# Patient Record
Sex: FEMALE | Race: WHITE | NOT HISPANIC OR LATINO | Employment: UNEMPLOYED | URBAN - METROPOLITAN AREA
[De-identification: names, ages, dates, MRNs, and addresses within clinical notes are randomized per-mention and may not be internally consistent; named-entity substitution may affect disease eponyms.]

---

## 2017-05-12 ENCOUNTER — TELEPHONE (OUTPATIENT)
Dept: FAMILY MEDICINE | Facility: HOSPITAL | Age: 62
End: 2017-05-12

## 2017-05-12 ENCOUNTER — OFFICE VISIT (OUTPATIENT)
Dept: FAMILY MEDICINE | Facility: HOSPITAL | Age: 62
End: 2017-05-12
Payer: MEDICARE

## 2017-05-12 VITALS
DIASTOLIC BLOOD PRESSURE: 86 MMHG | SYSTOLIC BLOOD PRESSURE: 140 MMHG | HEART RATE: 60 BPM | WEIGHT: 245.81 LBS | HEIGHT: 65 IN | BODY MASS INDEX: 40.96 KG/M2

## 2017-05-12 DIAGNOSIS — G89.29 CHRONIC MIDLINE LOW BACK PAIN WITHOUT SCIATICA: ICD-10-CM

## 2017-05-12 DIAGNOSIS — M54.50 CHRONIC MIDLINE LOW BACK PAIN WITHOUT SCIATICA: ICD-10-CM

## 2017-05-12 DIAGNOSIS — E66.01 MORBID OBESITY, UNSPECIFIED OBESITY TYPE: Primary | ICD-10-CM

## 2017-05-12 DIAGNOSIS — E78.5 HYPERLIPIDEMIA, UNSPECIFIED HYPERLIPIDEMIA TYPE: ICD-10-CM

## 2017-05-12 DIAGNOSIS — I10 ESSENTIAL HYPERTENSION: ICD-10-CM

## 2017-05-12 DIAGNOSIS — E11.9 TYPE 2 DIABETES MELLITUS WITHOUT COMPLICATION, WITHOUT LONG-TERM CURRENT USE OF INSULIN: ICD-10-CM

## 2017-05-12 PROCEDURE — 99213 OFFICE O/P EST LOW 20 MIN: CPT | Performed by: FAMILY MEDICINE

## 2017-05-12 RX ORDER — METFORMIN HYDROCHLORIDE 1000 MG/1
1000 TABLET ORAL
Qty: 90 TABLET | Refills: 3 | Status: SHIPPED | OUTPATIENT
Start: 2017-05-12 | End: 2017-05-12 | Stop reason: SDUPTHER

## 2017-05-12 RX ORDER — LISINOPRIL 5 MG/1
5 TABLET ORAL DAILY
Qty: 90 TABLET | Refills: 3 | Status: SHIPPED | OUTPATIENT
Start: 2017-05-12 | End: 2018-05-17 | Stop reason: SDUPTHER

## 2017-05-12 RX ORDER — HYDROCHLOROTHIAZIDE 25 MG/1
25 TABLET ORAL DAILY
Qty: 90 TABLET | Refills: 3 | Status: SHIPPED | OUTPATIENT
Start: 2017-05-12 | End: 2018-05-17 | Stop reason: SDUPTHER

## 2017-05-12 RX ORDER — IBUPROFEN 800 MG/1
800 TABLET ORAL DAILY PRN
Qty: 45 TABLET | Refills: 0 | Status: SHIPPED | OUTPATIENT
Start: 2017-05-12 | End: 2018-05-17 | Stop reason: SDUPTHER

## 2017-05-12 RX ORDER — HYDROCHLOROTHIAZIDE 25 MG/1
25 TABLET ORAL DAILY
Qty: 90 TABLET | Refills: 3 | Status: SHIPPED | OUTPATIENT
Start: 2017-05-12 | End: 2017-05-12 | Stop reason: SDUPTHER

## 2017-05-12 RX ORDER — ATORVASTATIN CALCIUM 40 MG/1
40 TABLET, FILM COATED ORAL DAILY
Qty: 90 TABLET | Refills: 3 | Status: SHIPPED | OUTPATIENT
Start: 2017-05-12 | End: 2018-05-17 | Stop reason: SDUPTHER

## 2017-05-12 RX ORDER — ATORVASTATIN CALCIUM 40 MG/1
40 TABLET, FILM COATED ORAL DAILY
Qty: 90 TABLET | Refills: 3 | Status: SHIPPED | OUTPATIENT
Start: 2017-05-12 | End: 2017-05-12 | Stop reason: SDUPTHER

## 2017-05-12 RX ORDER — METOPROLOL TARTRATE 50 MG/1
50 TABLET ORAL 2 TIMES DAILY
Qty: 90 TABLET | Refills: 3 | Status: SHIPPED | OUTPATIENT
Start: 2017-05-12 | End: 2017-05-12 | Stop reason: SDUPTHER

## 2017-05-12 RX ORDER — IBUPROFEN 800 MG/1
800 TABLET ORAL DAILY PRN
Qty: 45 TABLET | Refills: 0 | Status: SHIPPED | OUTPATIENT
Start: 2017-05-12 | End: 2017-05-12 | Stop reason: SDUPTHER

## 2017-05-12 RX ORDER — LISINOPRIL 5 MG/1
5 TABLET ORAL DAILY
Qty: 90 TABLET | Refills: 3 | Status: SHIPPED | OUTPATIENT
Start: 2017-05-12 | End: 2017-05-12 | Stop reason: SDUPTHER

## 2017-05-12 RX ORDER — METFORMIN HYDROCHLORIDE 1000 MG/1
1000 TABLET ORAL
Qty: 90 TABLET | Refills: 3 | Status: SHIPPED | OUTPATIENT
Start: 2017-05-12 | End: 2017-06-06 | Stop reason: SDUPTHER

## 2017-05-12 RX ORDER — METOPROLOL TARTRATE 50 MG/1
50 TABLET ORAL 2 TIMES DAILY
Qty: 90 TABLET | Refills: 3 | Status: SHIPPED | OUTPATIENT
Start: 2017-05-12 | End: 2017-06-06 | Stop reason: SDUPTHER

## 2017-05-12 NOTE — PROGRESS NOTES
Subjective:       Patient ID: Nam William is a 61 y.o. female.    Chief Complaint: Follow-up    HPI   62 y/o female with PMH of HTN, DM, and HLD here for check up. Pt states she is feeling well and has no complaints today. She denies recent illness, fever, chills, HA, SOB, CP, n/v/d. She has been trying to stay active and exercise more. She reports that she uses a push mower to cut her grass and also enjoys gardening to stay active.     Review of Systems   Constitutional: Negative for chills and fever.   HENT: Negative for congestion, rhinorrhea and sore throat.    Eyes: Negative for discharge and redness.   Respiratory: Negative for cough, shortness of breath, wheezing and stridor.    Cardiovascular: Negative for chest pain, palpitations and leg swelling.   Gastrointestinal: Negative for abdominal distention, abdominal pain, blood in stool, constipation, diarrhea, nausea and vomiting.   Genitourinary: Negative for difficulty urinating, dysuria and hematuria.   Musculoskeletal: Negative for back pain and neck pain.   Skin: Negative for rash.   Neurological: Negative for dizziness, light-headedness and headaches.   Psychiatric/Behavioral: Negative for agitation, behavioral problems and confusion.   All other systems reviewed and are negative.      Objective:      Vitals:    05/12/17 0912   BP: (!) 140/86   Pulse: 60     Physical Exam   Constitutional: She is oriented to person, place, and time. She appears well-developed and well-nourished. No distress.   HENT:   Head: Normocephalic and atraumatic.   Mouth/Throat: No oropharyngeal exudate.   Eyes: Conjunctivae and EOM are normal. Pupils are equal, round, and reactive to light. Right eye exhibits no discharge. Left eye exhibits no discharge. No scleral icterus.   Neck: Normal range of motion. Neck supple. No tracheal deviation present. No thyromegaly present.   Cardiovascular: Normal rate, regular rhythm, normal heart sounds and intact distal pulses.  Exam reveals  no gallop and no friction rub.    No murmur heard.  Pulmonary/Chest: Effort normal and breath sounds normal. No respiratory distress. She has no wheezes. She has no rales. She exhibits no tenderness.   Abdominal: Soft. Bowel sounds are normal. She exhibits no distension and no mass. There is no tenderness.   Musculoskeletal: Normal range of motion. She exhibits no edema or tenderness.   Lymphadenopathy:     She has no cervical adenopathy.   Neurological: She is alert and oriented to person, place, and time.   Skin: Skin is warm. No rash noted. She is not diaphoretic. No erythema.   Psychiatric: She has a normal mood and affect. Her behavior is normal. Judgment and thought content normal.   Nursing note and vitals reviewed.      Assessment:       1. Morbid obesity, unspecified obesity type    2. Type 2 diabetes mellitus without complication, without long-term current use of insulin    3. Hyperlipidemia, unspecified hyperlipidemia type    4. Essential hypertension    5. Chronic midline low back pain without sciatica        Plan:       Type 2 diabetes mellitus without complication, without long-term current use of insulin   Stable  -     Comprehensive metabolic panel; Future; Expected date: 5/12/17  -     Hemoglobin A1c; Future; Expected date: 5/12/17  -     Lipid panel; Future; Expected date: 5/12/17  -     atorvastatin (LIPITOR) 40 MG tablet; Take 1 tablet (40 mg total) by mouth once daily.  Dispense: 90 tablet; Refill: 3  -     metformin (GLUCOPHAGE) 1000 MG tablet; Take 1 tablet (1,000 mg total) by mouth daily with breakfast.  Dispense: 90 tablet; Refill: 3   A1c worsened since June 2016. Pt needs closer follow up and to take her Metformin as prescribed, BID and not qd as she is now.   Hyperlipidemia, unspecified hyperlipidemia type  -     Lipid panel; Future; Expected date: 5/12/17  -     atorvastatin (LIPITOR) 40 MG tablet; Take 1 tablet (40 mg total) by mouth once daily.  Dispense: 90 tablet; Refill:  3    Essential hypertension   Stable  -     Comprehensive metabolic panel; Future; Expected date: 5/12/17  -     hydrochlorothiazide (HYDRODIURIL) 25 MG tablet; Take 1 tablet (25 mg total) by mouth once daily.  Dispense: 90 tablet; Refill: 3  -     lisinopril (PRINIVIL,ZESTRIL) 5 MG tablet; Take 1 tablet (5 mg total) by mouth once daily.  Dispense: 90 tablet; Refill: 3  -     metoprolol tartrate (LOPRESSOR) 50 MG tablet; Take 1 tablet (50 mg total) by mouth 2 (two) times daily.  Dispense: 90 tablet; Refill: 3    Chronic midline low back pain without sciatica   Stable  -     ibuprofen (ADVIL,MOTRIN) 800 MG tablet; Take 1 tablet (800 mg total) by mouth daily as needed.  Dispense: 45 tablet; Refill: 0      Return in about 6 months (around 11/12/2017).   5/12/2017 David Montague M.D.  [facility] Resident PGY-1

## 2017-05-30 ENCOUNTER — TELEPHONE (OUTPATIENT)
Dept: FAMILY MEDICINE | Facility: HOSPITAL | Age: 62
End: 2017-05-30

## 2017-06-06 DIAGNOSIS — E11.9 TYPE 2 DIABETES MELLITUS WITHOUT COMPLICATION, WITHOUT LONG-TERM CURRENT USE OF INSULIN: ICD-10-CM

## 2017-06-06 DIAGNOSIS — I10 ESSENTIAL HYPERTENSION: ICD-10-CM

## 2017-06-06 RX ORDER — METFORMIN HYDROCHLORIDE 1000 MG/1
1000 TABLET ORAL 2 TIMES DAILY WITH MEALS
Qty: 180 TABLET | Refills: 3 | Status: SHIPPED | OUTPATIENT
Start: 2017-06-06 | End: 2018-05-17 | Stop reason: SDUPTHER

## 2017-06-06 RX ORDER — METOPROLOL TARTRATE 50 MG/1
50 TABLET ORAL 2 TIMES DAILY
Qty: 180 TABLET | Refills: 3 | Status: SHIPPED | OUTPATIENT
Start: 2017-06-06 | End: 2018-05-17 | Stop reason: SDUPTHER

## 2018-05-17 ENCOUNTER — OFFICE VISIT (OUTPATIENT)
Dept: FAMILY MEDICINE | Facility: HOSPITAL | Age: 63
End: 2018-05-17
Attending: FAMILY MEDICINE
Payer: MEDICARE

## 2018-05-17 VITALS
DIASTOLIC BLOOD PRESSURE: 81 MMHG | WEIGHT: 237.88 LBS | SYSTOLIC BLOOD PRESSURE: 136 MMHG | BODY MASS INDEX: 39.63 KG/M2 | HEIGHT: 65 IN | HEART RATE: 71 BPM

## 2018-05-17 DIAGNOSIS — I10 ESSENTIAL HYPERTENSION: ICD-10-CM

## 2018-05-17 DIAGNOSIS — G89.29 CHRONIC MIDLINE LOW BACK PAIN WITHOUT SCIATICA: ICD-10-CM

## 2018-05-17 DIAGNOSIS — E78.5 HYPERLIPIDEMIA, UNSPECIFIED HYPERLIPIDEMIA TYPE: ICD-10-CM

## 2018-05-17 DIAGNOSIS — E11.9 TYPE 2 DIABETES MELLITUS WITHOUT COMPLICATION, WITHOUT LONG-TERM CURRENT USE OF INSULIN: ICD-10-CM

## 2018-05-17 DIAGNOSIS — M54.50 CHRONIC MIDLINE LOW BACK PAIN WITHOUT SCIATICA: ICD-10-CM

## 2018-05-17 PROCEDURE — 99213 OFFICE O/P EST LOW 20 MIN: CPT | Performed by: FAMILY MEDICINE

## 2018-05-17 RX ORDER — METOPROLOL TARTRATE 50 MG/1
50 TABLET ORAL 2 TIMES DAILY
Qty: 180 TABLET | Refills: 3 | Status: SHIPPED | OUTPATIENT
Start: 2018-05-17 | End: 2018-05-17 | Stop reason: SDUPTHER

## 2018-05-17 RX ORDER — HYDROCHLOROTHIAZIDE 25 MG/1
25 TABLET ORAL DAILY
Qty: 90 TABLET | Refills: 3 | Status: SHIPPED | OUTPATIENT
Start: 2018-05-17 | End: 2018-05-17 | Stop reason: SDUPTHER

## 2018-05-17 RX ORDER — METFORMIN HYDROCHLORIDE 1000 MG/1
1000 TABLET ORAL 2 TIMES DAILY WITH MEALS
Qty: 180 TABLET | Refills: 3 | Status: SHIPPED | OUTPATIENT
Start: 2018-05-17 | End: 2018-05-17 | Stop reason: SDUPTHER

## 2018-05-17 RX ORDER — LISINOPRIL 5 MG/1
5 TABLET ORAL DAILY
Qty: 90 TABLET | Refills: 3 | Status: SHIPPED | OUTPATIENT
Start: 2018-05-17 | End: 2018-05-17 | Stop reason: SDUPTHER

## 2018-05-17 RX ORDER — HYDROCHLOROTHIAZIDE 25 MG/1
25 TABLET ORAL DAILY
Qty: 90 TABLET | Refills: 3 | Status: SHIPPED | OUTPATIENT
Start: 2018-05-17 | End: 2019-05-14 | Stop reason: SDUPTHER

## 2018-05-17 RX ORDER — ATORVASTATIN CALCIUM 40 MG/1
40 TABLET, FILM COATED ORAL DAILY
Qty: 90 TABLET | Refills: 3 | Status: SHIPPED | OUTPATIENT
Start: 2018-05-17 | End: 2018-05-17 | Stop reason: SDUPTHER

## 2018-05-17 RX ORDER — LISINOPRIL 5 MG/1
5 TABLET ORAL DAILY
Qty: 90 TABLET | Refills: 3 | Status: SHIPPED | OUTPATIENT
Start: 2018-05-17 | End: 2019-05-14

## 2018-05-17 RX ORDER — IBUPROFEN 800 MG/1
800 TABLET ORAL DAILY PRN
Qty: 60 TABLET | Refills: 0 | Status: SHIPPED | OUTPATIENT
Start: 2018-05-17 | End: 2018-05-17 | Stop reason: SDUPTHER

## 2018-05-17 RX ORDER — IBUPROFEN 800 MG/1
800 TABLET ORAL DAILY PRN
Qty: 60 TABLET | Refills: 0 | Status: SHIPPED | OUTPATIENT
Start: 2018-05-17 | End: 2020-05-18 | Stop reason: SDUPTHER

## 2018-05-17 RX ORDER — ATORVASTATIN CALCIUM 40 MG/1
40 TABLET, FILM COATED ORAL DAILY
Qty: 90 TABLET | Refills: 3 | Status: SHIPPED | OUTPATIENT
Start: 2018-05-17 | End: 2019-05-14 | Stop reason: SDUPTHER

## 2018-05-17 RX ORDER — METOPROLOL TARTRATE 50 MG/1
50 TABLET ORAL 2 TIMES DAILY
Qty: 180 TABLET | Refills: 3 | Status: SHIPPED | OUTPATIENT
Start: 2018-05-17 | End: 2019-05-14 | Stop reason: SDUPTHER

## 2018-05-17 RX ORDER — METFORMIN HYDROCHLORIDE 1000 MG/1
1000 TABLET ORAL 2 TIMES DAILY WITH MEALS
Qty: 180 TABLET | Refills: 3 | Status: SHIPPED | OUTPATIENT
Start: 2018-05-17 | End: 2019-05-14

## 2018-05-17 NOTE — PROGRESS NOTES
Subjective:       Patient ID: Nam William is a 62 y.o. female.    Chief Complaint: Diabetes    HPI   61 y/o F with PMH of HTN, DM2, HLD, here for check up and medication refills. States she is mostly compliant with her medications however she has been taking less metformin than prescribed. She has no complaints today and is asking that her medication be sent to mail delivery pharmacy. She denies fever, chills, HA, SOB, CP, n/v/d.   No recent travel or sick contacts.     Review of Systems   Constitutional: Negative for chills and fever.   HENT: Negative for congestion, rhinorrhea and sore throat.    Eyes: Negative for discharge and redness.   Respiratory: Negative for cough, shortness of breath, wheezing and stridor.    Cardiovascular: Negative for chest pain, palpitations and leg swelling.   Gastrointestinal: Negative for abdominal distention, abdominal pain, blood in stool, constipation, diarrhea, nausea and vomiting.   Genitourinary: Negative for difficulty urinating, dysuria and hematuria.   Musculoskeletal: Negative for back pain and neck pain.   Skin: Negative for rash.   Neurological: Negative for dizziness, light-headedness and headaches.   Psychiatric/Behavioral: Negative for agitation, behavioral problems and confusion.   All other systems reviewed and are negative.      Objective:      Vitals:    05/17/18 1612   BP: 136/81   Pulse: 71     Physical Exam   Constitutional: She is oriented to person, place, and time. She appears well-developed and well-nourished. No distress.   obese   HENT:   Head: Normocephalic and atraumatic.   Eyes: Conjunctivae and EOM are normal. Pupils are equal, round, and reactive to light. Right eye exhibits no discharge. Left eye exhibits no discharge. No scleral icterus.   Neck: Normal range of motion. Neck supple. No tracheal deviation present. No thyromegaly present.   Cardiovascular: Normal rate, regular rhythm, normal heart sounds and intact distal pulses.  Exam reveals no  gallop and no friction rub.    No murmur heard.  Pulmonary/Chest: Effort normal and breath sounds normal. No respiratory distress. She has no wheezes. She has no rales. She exhibits no tenderness.   Abdominal: Soft. Bowel sounds are normal. She exhibits no distension and no mass. There is no tenderness.   Musculoskeletal: Normal range of motion. She exhibits no edema or tenderness.   Lymphadenopathy:     She has no cervical adenopathy.   Neurological: She is alert and oriented to person, place, and time.   Skin: Skin is warm. No rash noted. She is not diaphoretic. No erythema.   Psychiatric: She has a normal mood and affect. Her behavior is normal. Judgment and thought content normal.   Nursing note and vitals reviewed.      Assessment:       1. Type 2 diabetes mellitus without complication, without long-term current use of insulin    2. Hyperlipidemia, unspecified hyperlipidemia type    3. Essential hypertension    4. Chronic midline low back pain without sciatica        Plan:       Type 2 diabetes mellitus without complication, without long-term current use of insulin  -     Hemoglobin A1c; Future; Expected date: 05/17/2018  -     Microalbumin/creatinine urine ratio  -     Lipid panel; Future; Expected date: 05/17/2018  -     CBC auto differential; Future; Expected date: 05/17/2018  -     Comprehensive metabolic panel; Future; Expected date: 05/17/2018  -     metFORMIN (GLUCOPHAGE) 1000 MG tablet; Take 1 tablet (1,000 mg total) by mouth 2 (two) times daily with meals.  Dispense: 180 tablet; Refill: 3  -     atorvastatin (LIPITOR) 40 MG tablet; Take 1 tablet (40 mg total) by mouth once daily.  Dispense: 90 tablet; Refill: 3    Hyperlipidemia, unspecified hyperlipidemia type  -     atorvastatin (LIPITOR) 40 MG tablet; Take 1 tablet (40 mg total) by mouth once daily.  Dispense: 90 tablet; Refill: 3    Essential hypertension  -     lisinopril (PRINIVIL,ZESTRIL) 5 MG tablet; Take 1 tablet (5 mg total) by mouth once  daily.  Dispense: 90 tablet; Refill: 3  -     hydroCHLOROthiazide (HYDRODIURIL) 25 MG tablet; Take 1 tablet (25 mg total) by mouth once daily.  Dispense: 90 tablet; Refill: 3  -     metoprolol tartrate (LOPRESSOR) 50 MG tablet; Take 1 tablet (50 mg total) by mouth 2 (two) times daily.  Dispense: 180 tablet; Refill: 3    Chronic midline low back pain without sciatica  -     ibuprofen (ADVIL,MOTRIN) 800 MG tablet; Take 1 tablet (800 mg total) by mouth daily as needed.  Dispense: 60 tablet; Refill: 0    Continue current medications and asked her to be more compliant with prescriptions. Also counseled on diet and lifestyle modifications. Return in 3 months for HbA1c. Needs mammogram but does not want it.     Follow-up in about 3 months (around 8/17/2018) for HbA1c check.      5/26/2018 David Montague M.D.  [facility] Resident PGY-2

## 2018-09-20 NOTE — TELEPHONE ENCOUNTER
----- Message from Cinthia Olmedo sent at 5/30/2017  9:25 AM CDT -----  Contact: SELF / [phone]  Patient states that 2 of her medications were given the wrong amount, she states they're supposed to be 180 tablets with 3 refills both medications instead of 90 day supply those medications are     1. metoprolol tartrate (LOPRESSOR) 50 MG tablet    2. metformin (GLUCOPHAGE) 1000 MG tablet      Patient would like a call back once this is done     no tingling

## 2019-05-14 ENCOUNTER — OFFICE VISIT (OUTPATIENT)
Dept: FAMILY MEDICINE | Facility: HOSPITAL | Age: 64
End: 2019-05-14
Payer: MEDICARE

## 2019-05-14 ENCOUNTER — LAB VISIT (OUTPATIENT)
Dept: LAB | Facility: HOSPITAL | Age: 64
End: 2019-05-14
Attending: FAMILY MEDICINE
Payer: MEDICARE

## 2019-05-14 VITALS
DIASTOLIC BLOOD PRESSURE: 68 MMHG | HEART RATE: 76 BPM | WEIGHT: 228.19 LBS | BODY MASS INDEX: 38.02 KG/M2 | SYSTOLIC BLOOD PRESSURE: 150 MMHG | HEIGHT: 65 IN

## 2019-05-14 DIAGNOSIS — E11.9 TYPE 2 DIABETES MELLITUS WITHOUT COMPLICATION, WITHOUT LONG-TERM CURRENT USE OF INSULIN: ICD-10-CM

## 2019-05-14 DIAGNOSIS — E78.5 HYPERLIPIDEMIA, UNSPECIFIED HYPERLIPIDEMIA TYPE: ICD-10-CM

## 2019-05-14 DIAGNOSIS — I10 ESSENTIAL HYPERTENSION: ICD-10-CM

## 2019-05-14 LAB
ALBUMIN SERPL BCP-MCNC: 4 G/DL (ref 3.5–5.2)
ALP SERPL-CCNC: 84 U/L (ref 55–135)
ALT SERPL W/O P-5'-P-CCNC: 18 U/L (ref 10–44)
ANION GAP SERPL CALC-SCNC: 10 MMOL/L (ref 8–16)
AST SERPL-CCNC: 20 U/L (ref 10–40)
BASOPHILS # BLD AUTO: 0.03 K/UL (ref 0–0.2)
BASOPHILS NFR BLD: 0.5 % (ref 0–1.9)
BILIRUB SERPL-MCNC: 0.6 MG/DL (ref 0.1–1)
BUN SERPL-MCNC: 14 MG/DL (ref 8–23)
CALCIUM SERPL-MCNC: 10.3 MG/DL (ref 8.7–10.5)
CHLORIDE SERPL-SCNC: 100 MMOL/L (ref 95–110)
CHOLEST SERPL-MCNC: 209 MG/DL (ref 120–199)
CHOLEST/HDLC SERPL: 4.3 {RATIO} (ref 2–5)
CO2 SERPL-SCNC: 31 MMOL/L (ref 23–29)
CREAT SERPL-MCNC: 1 MG/DL (ref 0.5–1.4)
DIFFERENTIAL METHOD: NORMAL
EOSINOPHIL # BLD AUTO: 0.1 K/UL (ref 0–0.5)
EOSINOPHIL NFR BLD: 1.7 % (ref 0–8)
ERYTHROCYTE [DISTWIDTH] IN BLOOD BY AUTOMATED COUNT: 12.7 % (ref 11.5–14.5)
EST. GFR  (AFRICAN AMERICAN): >60 ML/MIN/1.73 M^2
EST. GFR  (NON AFRICAN AMERICAN): >60 ML/MIN/1.73 M^2
ESTIMATED AVG GLUCOSE: 229 MG/DL (ref 68–131)
GLUCOSE SERPL-MCNC: 277 MG/DL (ref 70–110)
HBA1C MFR BLD HPLC: 9.6 % (ref 4–5.6)
HCT VFR BLD AUTO: 44.7 % (ref 37–48.5)
HDLC SERPL-MCNC: 49 MG/DL (ref 40–75)
HDLC SERPL: 23.4 % (ref 20–50)
HGB BLD-MCNC: 14.5 G/DL (ref 12–16)
LDLC SERPL CALC-MCNC: 98.4 MG/DL (ref 63–159)
LYMPHOCYTES # BLD AUTO: 1.7 K/UL (ref 1–4.8)
LYMPHOCYTES NFR BLD: 26.7 % (ref 18–48)
MCH RBC QN AUTO: 27.9 PG (ref 27–31)
MCHC RBC AUTO-ENTMCNC: 32.4 G/DL (ref 32–36)
MCV RBC AUTO: 86 FL (ref 82–98)
MONOCYTES # BLD AUTO: 0.4 K/UL (ref 0.3–1)
MONOCYTES NFR BLD: 6.3 % (ref 4–15)
NEUTROPHILS # BLD AUTO: 4.1 K/UL (ref 1.8–7.7)
NEUTROPHILS NFR BLD: 64.6 % (ref 38–73)
NONHDLC SERPL-MCNC: 160 MG/DL
PLATELET # BLD AUTO: 168 K/UL (ref 150–350)
PMV BLD AUTO: 11.7 FL (ref 9.2–12.9)
POTASSIUM SERPL-SCNC: 3.9 MMOL/L (ref 3.5–5.1)
PROT SERPL-MCNC: 7.8 G/DL (ref 6–8.4)
RBC # BLD AUTO: 5.19 M/UL (ref 4–5.4)
SODIUM SERPL-SCNC: 141 MMOL/L (ref 136–145)
TRIGL SERPL-MCNC: 308 MG/DL (ref 30–150)
WBC # BLD AUTO: 6.36 K/UL (ref 3.9–12.7)

## 2019-05-14 PROCEDURE — 36415 COLL VENOUS BLD VENIPUNCTURE: CPT

## 2019-05-14 PROCEDURE — 83036 HEMOGLOBIN GLYCOSYLATED A1C: CPT

## 2019-05-14 PROCEDURE — 80053 COMPREHEN METABOLIC PANEL: CPT

## 2019-05-14 PROCEDURE — 85025 COMPLETE CBC W/AUTO DIFF WBC: CPT

## 2019-05-14 PROCEDURE — 99213 OFFICE O/P EST LOW 20 MIN: CPT | Performed by: FAMILY MEDICINE

## 2019-05-14 PROCEDURE — 80061 LIPID PANEL: CPT

## 2019-05-14 RX ORDER — ATORVASTATIN CALCIUM 40 MG/1
40 TABLET, FILM COATED ORAL DAILY
Qty: 90 TABLET | Refills: 3 | Status: SHIPPED | OUTPATIENT
Start: 2019-05-14 | End: 2020-05-18 | Stop reason: SDUPTHER

## 2019-05-14 RX ORDER — METFORMIN HYDROCHLORIDE 1000 MG/1
1000 TABLET ORAL
Qty: 90 TABLET | Refills: 3 | Status: SHIPPED | OUTPATIENT
Start: 2019-05-14 | End: 2020-05-18 | Stop reason: SDUPTHER

## 2019-05-14 RX ORDER — METOPROLOL TARTRATE 50 MG/1
50 TABLET ORAL 2 TIMES DAILY
Qty: 180 TABLET | Refills: 3 | Status: SHIPPED | OUTPATIENT
Start: 2019-05-14 | End: 2020-05-18 | Stop reason: SDUPTHER

## 2019-05-14 RX ORDER — HYDROCHLOROTHIAZIDE 25 MG/1
25 TABLET ORAL DAILY
Qty: 90 TABLET | Refills: 3 | Status: SHIPPED | OUTPATIENT
Start: 2019-05-14 | End: 2020-05-18 | Stop reason: ALTCHOICE

## 2019-05-14 NOTE — PROGRESS NOTES
Subjective:       Patient ID: Nam William is a 63 y.o. female.    Chief Complaint: diabetes follow up     HPI   Doing well, no complaints.  Taking metformin not as prescribed, only taking 1000 mg daily due to GI upset.  Claims compliance with all meds, including BP meds.  Does not want any cancer screening (paps, mammo, scopes, or stool testing).    Review of Systems   Constitutional: Negative for chills and fever.   HENT: Negative for congestion, rhinorrhea and sore throat.    Eyes: Negative for discharge and redness.   Respiratory: Negative for cough, shortness of breath, wheezing and stridor.    Cardiovascular: Negative for chest pain, palpitations and leg swelling.   Gastrointestinal: Negative for abdominal distention, abdominal pain, blood in stool, constipation, diarrhea, nausea and vomiting.   Genitourinary: Negative for difficulty urinating, dysuria and hematuria.   Musculoskeletal: Negative for back pain and neck pain.   Skin: Negative for rash.   Neurological: Negative for dizziness, light-headedness and headaches.   Psychiatric/Behavioral: Negative for agitation, behavioral problems and confusion.   All other systems reviewed and are negative.      Objective:      Vitals:    05/14/19 1330   BP: (!) 150/68   Pulse: 76     Physical Exam   Constitutional: She is oriented to person, place, and time. She appears well-developed and well-nourished. No distress.   HENT:   Head: Normocephalic and atraumatic.   Eyes: Pupils are equal, round, and reactive to light. Conjunctivae and EOM are normal. Right eye exhibits no discharge. Left eye exhibits no discharge. No scleral icterus.   Neck: Normal range of motion. Neck supple. No tracheal deviation present. No thyromegaly present.   Cardiovascular: Normal rate, regular rhythm, normal heart sounds and intact distal pulses. Exam reveals no gallop and no friction rub.   No murmur heard.  Pulmonary/Chest: Effort normal and breath sounds normal. No respiratory distress.  She has no wheezes. She has no rales. She exhibits no tenderness.   Abdominal: Soft. Bowel sounds are normal. She exhibits no distension and no mass. There is no tenderness.   Musculoskeletal: Normal range of motion. She exhibits no edema or tenderness.   Lymphadenopathy:     She has no cervical adenopathy.   Neurological: She is alert and oriented to person, place, and time.   Skin: Skin is warm. No rash noted. She is not diaphoretic. No erythema.   Psychiatric: She has a normal mood and affect. Her behavior is normal. Judgment and thought content normal.   Nursing note and vitals reviewed.      Assessment:       1. Type 2 diabetes mellitus without complication, without long-term current use of insulin    2. Hyperlipidemia, unspecified hyperlipidemia type    3. Essential hypertension        Plan:       Type 2 diabetes mellitus without complication, without long-term current use of insulin  -     atorvastatin (LIPITOR) 40 MG tablet; Take 1 tablet (40 mg total) by mouth once daily.  Dispense: 90 tablet; Refill: 3  -     metFORMIN (GLUCOPHAGE) 1000 MG tablet; Take 1 tablet (1,000 mg total) by mouth daily with breakfast.  Dispense: 90 tablet; Refill: 3    Hyperlipidemia, unspecified hyperlipidemia type  -     atorvastatin (LIPITOR) 40 MG tablet; Take 1 tablet (40 mg total) by mouth once daily.  Dispense: 90 tablet; Refill: 3    Essential hypertension  -     hydroCHLOROthiazide (HYDRODIURIL) 25 MG tablet; Take 1 tablet (25 mg total) by mouth once daily.  Dispense: 90 tablet; Refill: 3  -     lisinopril 10 MG Tab; Take 1 tablet (10 mg total) by mouth once daily.  Dispense: 90 tablet; Refill: 3  -     metoprolol tartrate (LOPRESSOR) 50 MG tablet; Take 1 tablet (50 mg total) by mouth 2 (two) times daily.  Dispense: 180 tablet; Refill: 3    Increased ACE-I to 10 mg daily. Will call patient with new lab results and see if her BP has improved on new Lisinopril dosage.   Diabetes is not well controlled as of 2 years ago. Will  call with new A1c value once she gets labs.     Follow up in about 6 months (around 11/14/2019) for diabetes follow up.      5/14/2019 David Montague M.D.  [facility] Resident PGY3

## 2019-05-14 NOTE — PROGRESS NOTES
I reviewed the note and discussed with Dr. Montague. This lady does not come very often and is not interested in Ca screening. I suggest trying Metformin ER which may be less likely to cause diarrhea. May be able to titrate BP meds by phone and give her encouragement for followup visits.

## 2019-05-16 ENCOUNTER — TELEPHONE (OUTPATIENT)
Dept: FAMILY MEDICINE | Facility: HOSPITAL | Age: 64
End: 2019-05-16

## 2019-05-16 NOTE — TELEPHONE ENCOUNTER
----- Message from Shantell Mera MA sent at 5/16/2019  8:59 AM CDT -----  Patient returning your call.  Please call back to number above.  Thanks.

## 2020-05-18 ENCOUNTER — OFFICE VISIT (OUTPATIENT)
Dept: FAMILY MEDICINE | Facility: HOSPITAL | Age: 65
End: 2020-05-18
Payer: MEDICARE

## 2020-05-18 VITALS
HEIGHT: 65 IN | SYSTOLIC BLOOD PRESSURE: 176 MMHG | BODY MASS INDEX: 37.18 KG/M2 | DIASTOLIC BLOOD PRESSURE: 95 MMHG | HEART RATE: 68 BPM | WEIGHT: 223.13 LBS

## 2020-05-18 DIAGNOSIS — E11.9 TYPE 2 DIABETES MELLITUS WITHOUT COMPLICATION, WITHOUT LONG-TERM CURRENT USE OF INSULIN: Primary | ICD-10-CM

## 2020-05-18 DIAGNOSIS — I10 ESSENTIAL HYPERTENSION: ICD-10-CM

## 2020-05-18 DIAGNOSIS — E78.5 HYPERLIPIDEMIA, UNSPECIFIED HYPERLIPIDEMIA TYPE: ICD-10-CM

## 2020-05-18 DIAGNOSIS — G89.29 CHRONIC MIDLINE LOW BACK PAIN WITHOUT SCIATICA: ICD-10-CM

## 2020-05-18 DIAGNOSIS — Z53.20 SCREENING DECLINED BY PATIENT: ICD-10-CM

## 2020-05-18 DIAGNOSIS — M54.50 CHRONIC MIDLINE LOW BACK PAIN WITHOUT SCIATICA: ICD-10-CM

## 2020-05-18 LAB — GLUCOSE SERPL-MCNC: 285 MG/DL (ref 70–110)

## 2020-05-18 PROCEDURE — 99214 OFFICE O/P EST MOD 30 MIN: CPT | Performed by: STUDENT IN AN ORGANIZED HEALTH CARE EDUCATION/TRAINING PROGRAM

## 2020-05-18 PROCEDURE — 82962 GLUCOSE BLOOD TEST: CPT | Performed by: STUDENT IN AN ORGANIZED HEALTH CARE EDUCATION/TRAINING PROGRAM

## 2020-05-18 RX ORDER — LISINOPRIL AND HYDROCHLOROTHIAZIDE 20; 25 MG/1; MG/1
1 TABLET ORAL DAILY
Qty: 90 TABLET | Refills: 3 | Status: SHIPPED | OUTPATIENT
Start: 2020-05-18 | End: 2021-03-22 | Stop reason: SDUPTHER

## 2020-05-18 RX ORDER — METFORMIN HYDROCHLORIDE 1000 MG/1
1000 TABLET ORAL 2 TIMES DAILY
Qty: 180 TABLET | Refills: 3 | Status: SHIPPED | OUTPATIENT
Start: 2020-05-18 | End: 2021-03-22 | Stop reason: SDUPTHER

## 2020-05-18 RX ORDER — METOPROLOL TARTRATE 50 MG/1
50 TABLET ORAL 2 TIMES DAILY
Qty: 180 TABLET | Refills: 3 | Status: SHIPPED | OUTPATIENT
Start: 2020-05-18 | End: 2021-03-22 | Stop reason: SDUPTHER

## 2020-05-18 RX ORDER — IBUPROFEN 800 MG/1
800 TABLET ORAL DAILY PRN
Qty: 60 TABLET | Refills: 0 | Status: SHIPPED | OUTPATIENT
Start: 2020-05-18 | End: 2022-09-02 | Stop reason: SDUPTHER

## 2020-05-18 RX ORDER — INSULIN PUMP SYRINGE, 3 ML
EACH MISCELLANEOUS
Qty: 1 EACH | Refills: 0 | Status: SHIPPED | OUTPATIENT
Start: 2020-05-18 | End: 2023-08-10 | Stop reason: SDUPTHER

## 2020-05-18 RX ORDER — ATORVASTATIN CALCIUM 40 MG/1
40 TABLET, FILM COATED ORAL DAILY
Qty: 90 TABLET | Refills: 3 | Status: SHIPPED | OUTPATIENT
Start: 2020-05-18 | End: 2021-03-22 | Stop reason: SDUPTHER

## 2020-05-18 RX ORDER — LANCETS
EACH MISCELLANEOUS
Qty: 200 EACH | Refills: 0 | Status: SHIPPED | OUTPATIENT
Start: 2020-05-18 | End: 2022-09-02

## 2020-05-18 NOTE — PROGRESS NOTES
Subjective:       Patient ID: Nam William is a 64 y.o. female.    Chief Complaint: Annual Exam    Patient is a 64 year old female with PMHx of HTN, DM, HLD, Obesity who presented for an annual exam.  Patient is currently denying any complaints except for intermittent, sporadic lower back pain without sciatica that is well controlled with ibuprofen.  Patient presenting for refills and annual exam.  Blood pressure at appointments significantly elevated.  Patient believes this is due to white coat hypertension.  Blood pressure at previous visits labile but usually around 140-150/80-90.  Patient reports compliance with her lisinopril and hydrochlorothiazide including the morning of appointment.  Patient does not check her blood pressure at home.  Patient also does not check her blood sugar at home so unknown how well her diabetes is controlled.  Last A1C about 1 year ago 9.6. POCT glucose during appointment: 285.  Patient reports compliance with 1000 mg metformin daily.  Patient is endorsing slight loss of visual acuity recently.      Review of Systems   Constitutional: Negative for appetite change, chills, fatigue, fever and unexpected weight change.   HENT: Negative for rhinorrhea, sinus pressure, sinus pain, sneezing and sore throat.    Eyes: Positive for visual disturbance (slight loss of acuity when reading). Negative for pain, discharge and itching.   Respiratory: Negative for cough, chest tightness, shortness of breath and wheezing.    Cardiovascular: Negative for chest pain and palpitations.   Gastrointestinal: Negative for abdominal distention, abdominal pain, blood in stool, constipation, diarrhea, nausea and vomiting.   Genitourinary: Negative for difficulty urinating, dysuria, flank pain, frequency, hematuria and urgency.   Musculoskeletal: Negative for arthralgias, back pain and myalgias.   Skin: Negative for color change and rash.   Neurological: Negative for dizziness, weakness, light-headedness,  numbness and headaches.   Psychiatric/Behavioral: Negative for behavioral problems, confusion, decreased concentration and sleep disturbance. The patient is not nervous/anxious.        Objective:      Vitals:    05/18/20 1009   BP: (!) 176/95   Pulse: 68     Physical Exam   Constitutional: She is oriented to person, place, and time. No distress.   Obese   HENT:   Head: Normocephalic and atraumatic.   Right Ear: External ear normal.   Left Ear: External ear normal.   Nose: Nose normal.   Mouth/Throat: Oropharynx is clear and moist. No oropharyngeal exudate.   Eyes: Pupils are equal, round, and reactive to light. Conjunctivae and EOM are normal.   Neck: Normal range of motion. Neck supple. No JVD present.   Cardiovascular: Normal rate, regular rhythm, normal heart sounds and intact distal pulses.   No murmur heard.  Pulmonary/Chest: Effort normal and breath sounds normal. No respiratory distress. She has no wheezes.   Abdominal: Soft. Bowel sounds are normal. She exhibits no distension and no mass. There is no tenderness.   Musculoskeletal: Normal range of motion. She exhibits no edema.   Neurological: She is alert and oriented to person, place, and time.   Skin: Skin is warm and dry. Capillary refill takes less than 2 seconds. She is not diaphoretic.   Psychiatric: She has a normal mood and affect. Her behavior is normal.       Assessment:       1. Type 2 diabetes mellitus without complication, without long-term current use of insulin    2. Essential hypertension    3. Hyperlipidemia, unspecified hyperlipidemia type    4. Screening declined by patient    5. Chronic midline low back pain without sciatica        Plan:       Type 2 diabetes mellitus without complication, without long-term current use of insulin  -     POCT Glucose, Hand-Held Device  -     empagliflozin (JARDIANCE) 10 mg tablet; Take 1 tablet (10 mg total) by mouth once daily.  Dispense: 90 tablet; Refill: 3  -     metFORMIN (GLUCOPHAGE) 1000 MG tablet;  Take 1 tablet (1,000 mg total) by mouth 2 (two) times daily.  Dispense: 180 tablet; Refill: 3  -     CBC auto differential; Future; Expected date: 05/18/2020  -     Comprehensive metabolic panel; Future; Expected date: 05/18/2020  -     Lipid Panel; Future; Expected date: 05/18/2020  -     Hemoglobin A1C; Future; Expected date: 05/18/2020  -     Ambulatory referral/consult to Diabetes Education; Future; Expected date: 05/25/2020  -     Ambulatory referral/consult to Nutrition Services; Future; Expected date: 05/25/2020  -     blood-glucose meter kit; To check BG 4 times daily, to use with insurance preferred meter  Dispense: 1 each; Refill: 0  -     lancets Misc; To check BG 4 times daily, to use with insurance preferred meter  Dispense: 200 each; Refill: 0  -     blood sugar diagnostic Strp; To check BG 4 times daily, to use with insurance preferred meter  Dispense: 200 each; Refill: 0  - Patient with previously poorly controlled diabetes.  Last A1c was 9.6.  P.o. CT glucose today was 285.  Will assume that patient is continuing to be poorly controlled and will central labs.  Instructed the patient to increase her dose of metformin from 1000 daily to 1000 b.i.d..  Patient adamantly refuses any injectables.  Will start on empagliflozin.  Patient most likely will need insulin as well.  Ordered replacement of glucometer so patient actually measure home glucose.  Put in a referral for diabetes education and nutritionist. Recommended patient to see an ophthalmologist or optometrist; informed patient that insurance would almost certainly cover a diabetic eye exam.     Essential hypertension  -     lisinopriL-hydrochlorothiazide (PRINZIDE,ZESTORETIC) 20-25 mg Tab; Take 1 tablet by mouth once daily.  Dispense: 90 tablet; Refill: 3  -     metoprolol tartrate (LOPRESSOR) 50 MG tablet; Take 1 tablet (50 mg total) by mouth 2 (two) times daily.  Dispense: 180 tablet; Refill: 3  -     TSH; Future; Expected date:  05/18/2020  - patient with history of hypertension and significantly elevated blood pressure today.  Unclear if this is due to white coat hypertension or baseline uncontrolled hypertension.  Will double dose of lisinopril and combined with hydrochlorothiazide into 1 pill.  Instructed patient to watch blood pressure at home.    Hyperlipidemia, unspecified hyperlipidemia type  -     atorvastatin (LIPITOR) 40 MG tablet; Take 1 tablet (40 mg total) by mouth once daily.  Dispense: 90 tablet; Refill: 3    Chronic midline low back pain without sciatica  -     ibuprofen (ADVIL,MOTRIN) 800 MG tablet; Take 1 tablet (800 mg total) by mouth daily as needed.  Dispense: 60 tablet; Refill: 0    Patient Denied Screening         -     patient was offered and it was strongly recommended that she do all indicated screening tests including but not limited to cervical cancer screening, colorectal cancer screening, breast cancer screening, etc..  Patient adamantly refused interest in any of these.  This has been documented in the past that she has refused them before.  Patient said I will know if something is wrong with my body and I'll deal with that then.    Follow up in about 4 weeks (around 6/15/2020).      Nelson Noble MD PGY-1  [facility]   05/18/2020 4:48 PM    This note was partially created using [employer] Voice Recognition software. There may be occasional misinterpreted words, typos, or grammatical errors that were not caught upon review.

## 2020-05-21 ENCOUNTER — TELEPHONE (OUTPATIENT)
Dept: FAMILY MEDICINE | Facility: HOSPITAL | Age: 65
End: 2020-05-21

## 2021-03-18 ENCOUNTER — HOSPITAL ENCOUNTER (EMERGENCY)
Facility: HOSPITAL | Age: 66
Discharge: HOME | End: 2021-03-18
Attending: FAMILY MEDICINE
Payer: MEDICARE

## 2021-03-18 VITALS
OXYGEN SATURATION: 99 % | HEIGHT: 65 IN | BODY MASS INDEX: 32.65 KG/M2 | WEIGHT: 196 LBS | HEART RATE: 82 BPM | DIASTOLIC BLOOD PRESSURE: 89 MMHG | RESPIRATION RATE: 18 BRPM | SYSTOLIC BLOOD PRESSURE: 188 MMHG | TEMPERATURE: 98 F

## 2021-03-18 DIAGNOSIS — S39.012A LUMBAR STRAIN, INITIAL ENCOUNTER: Primary | ICD-10-CM

## 2021-03-18 LAB
BILIRUB UR QL STRIP: NEGATIVE
CLARITY UR REFRACT.AUTO: CLEAR
COLOR UR AUTO: YELLOW
GLUCOSE UR QL STRIP: NEGATIVE
HGB UR QL STRIP: NEGATIVE
KETONES UR QL STRIP: NEGATIVE
LEUKOCYTE ESTERASE UR QL STRIP: NEGATIVE
NITRITE UR QL STRIP: NEGATIVE
PH UR STRIP: 6 [PH] (ref 5–8)
POCT GLUCOSE: 190 MG/DL (ref 70–110)
PROT UR QL STRIP: NEGATIVE
SP GR UR STRIP: 1 (ref 1–1.03)
URN SPEC COLLECT METH UR: NORMAL
UROBILINOGEN UR STRIP-ACNC: NEGATIVE EU/DL

## 2021-03-18 PROCEDURE — 99283 EMERGENCY DEPT VISIT LOW MDM: CPT | Mod: 25,ER

## 2021-03-18 PROCEDURE — 81003 URINALYSIS AUTO W/O SCOPE: CPT | Mod: ER | Performed by: PHYSICIAN ASSISTANT

## 2021-03-18 PROCEDURE — 82962 GLUCOSE BLOOD TEST: CPT | Mod: ER

## 2021-03-18 PROCEDURE — 25000003 PHARM REV CODE 250: Mod: ER | Performed by: PHYSICIAN ASSISTANT

## 2021-03-18 RX ORDER — METHOCARBAMOL 750 MG/1
750 TABLET, FILM COATED ORAL 3 TIMES DAILY PRN
Qty: 30 TABLET | Refills: 0 | Status: SHIPPED | OUTPATIENT
Start: 2021-03-18 | End: 2021-03-28

## 2021-03-18 RX ORDER — METHOCARBAMOL 500 MG/1
500 TABLET, FILM COATED ORAL
Status: COMPLETED | OUTPATIENT
Start: 2021-03-18 | End: 2021-03-18

## 2021-03-18 RX ADMIN — METHOCARBAMOL TABLETS 500 MG: 500 TABLET, COATED ORAL at 12:03

## 2021-03-19 ENCOUNTER — PES CALL (OUTPATIENT)
Dept: ADMINISTRATIVE | Facility: CLINIC | Age: 66
End: 2021-03-19

## 2021-03-22 ENCOUNTER — OFFICE VISIT (OUTPATIENT)
Dept: FAMILY MEDICINE | Facility: HOSPITAL | Age: 66
End: 2021-03-22
Attending: FAMILY MEDICINE
Payer: MEDICARE

## 2021-03-22 ENCOUNTER — LAB VISIT (OUTPATIENT)
Dept: LAB | Facility: HOSPITAL | Age: 66
End: 2021-03-22
Attending: FAMILY MEDICINE
Payer: MEDICARE

## 2021-03-22 VITALS — WEIGHT: 212.31 LBS | HEIGHT: 65 IN | BODY MASS INDEX: 35.37 KG/M2

## 2021-03-22 DIAGNOSIS — E11.9 TYPE 2 DIABETES MELLITUS WITHOUT COMPLICATION, WITHOUT LONG-TERM CURRENT USE OF INSULIN: ICD-10-CM

## 2021-03-22 DIAGNOSIS — Z00.00 PREVENTATIVE HEALTH CARE: ICD-10-CM

## 2021-03-22 DIAGNOSIS — E78.5 HYPERLIPIDEMIA, UNSPECIFIED HYPERLIPIDEMIA TYPE: ICD-10-CM

## 2021-03-22 DIAGNOSIS — I10 ESSENTIAL HYPERTENSION: ICD-10-CM

## 2021-03-22 DIAGNOSIS — E11.9 TYPE 2 DIABETES MELLITUS WITHOUT COMPLICATION, WITHOUT LONG-TERM CURRENT USE OF INSULIN: Primary | ICD-10-CM

## 2021-03-22 LAB
ALBUMIN SERPL BCP-MCNC: 4.1 G/DL (ref 3.5–5.2)
ALP SERPL-CCNC: 64 U/L (ref 55–135)
ALT SERPL W/O P-5'-P-CCNC: 17 U/L (ref 10–44)
ANION GAP SERPL CALC-SCNC: 11 MMOL/L (ref 8–16)
AST SERPL-CCNC: 17 U/L (ref 10–40)
BASOPHILS # BLD AUTO: 0.05 K/UL (ref 0–0.2)
BASOPHILS NFR BLD: 0.8 % (ref 0–1.9)
BILIRUB SERPL-MCNC: 0.6 MG/DL (ref 0.1–1)
BUN SERPL-MCNC: 22 MG/DL (ref 8–23)
CALCIUM SERPL-MCNC: 9.3 MG/DL (ref 8.7–10.5)
CHLORIDE SERPL-SCNC: 104 MMOL/L (ref 95–110)
CHOLEST SERPL-MCNC: 168 MG/DL (ref 120–199)
CHOLEST/HDLC SERPL: 3.6 {RATIO} (ref 2–5)
CO2 SERPL-SCNC: 25 MMOL/L (ref 23–29)
CREAT SERPL-MCNC: 1 MG/DL (ref 0.5–1.4)
DIFFERENTIAL METHOD: NORMAL
EOSINOPHIL # BLD AUTO: 0.1 K/UL (ref 0–0.5)
EOSINOPHIL NFR BLD: 1.8 % (ref 0–8)
ERYTHROCYTE [DISTWIDTH] IN BLOOD BY AUTOMATED COUNT: 13 % (ref 11.5–14.5)
EST. GFR  (AFRICAN AMERICAN): >60 ML/MIN/1.73 M^2
EST. GFR  (NON AFRICAN AMERICAN): >60 ML/MIN/1.73 M^2
ESTIMATED AVG GLUCOSE: 235 MG/DL (ref 68–131)
GLUCOSE SERPL-MCNC: 212 MG/DL (ref 70–110)
HBA1C MFR BLD: 9.8 % (ref 4–5.6)
HCT VFR BLD AUTO: 46 % (ref 37–48.5)
HDLC SERPL-MCNC: 47 MG/DL (ref 40–75)
HDLC SERPL: 28 % (ref 20–50)
HGB BLD-MCNC: 14.7 G/DL (ref 12–16)
IMM GRANULOCYTES # BLD AUTO: 0.01 K/UL (ref 0–0.04)
IMM GRANULOCYTES NFR BLD AUTO: 0.2 % (ref 0–0.5)
LDLC SERPL CALC-MCNC: 98.4 MG/DL (ref 63–159)
LYMPHOCYTES # BLD AUTO: 1.9 K/UL (ref 1–4.8)
LYMPHOCYTES NFR BLD: 29.9 % (ref 18–48)
MCH RBC QN AUTO: 27.4 PG (ref 27–31)
MCHC RBC AUTO-ENTMCNC: 32 G/DL (ref 32–36)
MCV RBC AUTO: 86 FL (ref 82–98)
MONOCYTES # BLD AUTO: 0.5 K/UL (ref 0.3–1)
MONOCYTES NFR BLD: 7.3 % (ref 4–15)
NEUTROPHILS # BLD AUTO: 3.8 K/UL (ref 1.8–7.7)
NEUTROPHILS NFR BLD: 60 % (ref 38–73)
NONHDLC SERPL-MCNC: 121 MG/DL
NRBC BLD-RTO: 0 /100 WBC
PLATELET # BLD AUTO: 154 K/UL (ref 150–350)
PMV BLD AUTO: 11.6 FL (ref 9.2–12.9)
POTASSIUM SERPL-SCNC: 4.3 MMOL/L (ref 3.5–5.1)
PROT SERPL-MCNC: 7.2 G/DL (ref 6–8.4)
RBC # BLD AUTO: 5.36 M/UL (ref 4–5.4)
SODIUM SERPL-SCNC: 140 MMOL/L (ref 136–145)
TRIGL SERPL-MCNC: 113 MG/DL (ref 30–150)
WBC # BLD AUTO: 6.26 K/UL (ref 3.9–12.7)

## 2021-03-22 PROCEDURE — 83036 HEMOGLOBIN GLYCOSYLATED A1C: CPT | Performed by: STUDENT IN AN ORGANIZED HEALTH CARE EDUCATION/TRAINING PROGRAM

## 2021-03-22 PROCEDURE — 85025 COMPLETE CBC W/AUTO DIFF WBC: CPT | Performed by: STUDENT IN AN ORGANIZED HEALTH CARE EDUCATION/TRAINING PROGRAM

## 2021-03-22 PROCEDURE — 80061 LIPID PANEL: CPT | Performed by: STUDENT IN AN ORGANIZED HEALTH CARE EDUCATION/TRAINING PROGRAM

## 2021-03-22 PROCEDURE — 36415 COLL VENOUS BLD VENIPUNCTURE: CPT | Performed by: STUDENT IN AN ORGANIZED HEALTH CARE EDUCATION/TRAINING PROGRAM

## 2021-03-22 PROCEDURE — 99213 OFFICE O/P EST LOW 20 MIN: CPT | Performed by: STUDENT IN AN ORGANIZED HEALTH CARE EDUCATION/TRAINING PROGRAM

## 2021-03-22 PROCEDURE — 80053 COMPREHEN METABOLIC PANEL: CPT | Performed by: STUDENT IN AN ORGANIZED HEALTH CARE EDUCATION/TRAINING PROGRAM

## 2021-03-22 RX ORDER — LISINOPRIL AND HYDROCHLOROTHIAZIDE 20; 25 MG/1; MG/1
1 TABLET ORAL DAILY
Qty: 90 TABLET | Refills: 3 | Status: SHIPPED | OUTPATIENT
Start: 2021-03-22 | End: 2022-01-25

## 2021-03-22 RX ORDER — ATORVASTATIN CALCIUM 40 MG/1
40 TABLET, FILM COATED ORAL DAILY
Qty: 90 TABLET | Refills: 3 | Status: SHIPPED | OUTPATIENT
Start: 2021-03-22 | End: 2022-01-25 | Stop reason: SDUPTHER

## 2021-03-22 RX ORDER — SULFAMETHOXAZOLE AND TRIMETHOPRIM 800; 160 MG/1; MG/1
1 TABLET ORAL 2 TIMES DAILY
COMMUNITY
Start: 2021-03-15 | End: 2021-03-22 | Stop reason: ALTCHOICE

## 2021-03-22 RX ORDER — NAPROXEN 500 MG/1
TABLET ORAL
COMMUNITY
Start: 2021-03-15 | End: 2022-01-10

## 2021-03-22 RX ORDER — METFORMIN HYDROCHLORIDE 1000 MG/1
1000 TABLET ORAL 2 TIMES DAILY
Qty: 180 TABLET | Refills: 3 | Status: SHIPPED | OUTPATIENT
Start: 2021-03-22 | End: 2022-01-25 | Stop reason: SDUPTHER

## 2021-03-22 RX ORDER — METOPROLOL TARTRATE 50 MG/1
50 TABLET ORAL 2 TIMES DAILY
Qty: 180 TABLET | Refills: 3 | Status: SHIPPED | OUTPATIENT
Start: 2021-03-22 | End: 2022-01-25 | Stop reason: SDUPTHER

## 2021-03-25 ENCOUNTER — TELEPHONE (OUTPATIENT)
Dept: FAMILY MEDICINE | Facility: HOSPITAL | Age: 66
End: 2021-03-25

## 2021-12-31 ENCOUNTER — HOSPITAL ENCOUNTER (EMERGENCY)
Facility: HOSPITAL | Age: 66
Discharge: HOME | End: 2021-12-31
Attending: EMERGENCY MEDICINE
Payer: MEDICARE

## 2021-12-31 VITALS
DIASTOLIC BLOOD PRESSURE: 85 MMHG | BODY MASS INDEX: 33.28 KG/M2 | TEMPERATURE: 98 F | SYSTOLIC BLOOD PRESSURE: 186 MMHG | RESPIRATION RATE: 20 BRPM | HEART RATE: 68 BPM | WEIGHT: 200 LBS | OXYGEN SATURATION: 99 %

## 2021-12-31 DIAGNOSIS — M54.12 CERVICAL RADICULOPATHY: Primary | ICD-10-CM

## 2021-12-31 DIAGNOSIS — M25.512 LEFT SHOULDER PAIN: ICD-10-CM

## 2021-12-31 DIAGNOSIS — M79.603 ARM PAIN: ICD-10-CM

## 2021-12-31 PROCEDURE — 25000003 PHARM REV CODE 250: Mod: ER | Performed by: EMERGENCY MEDICINE

## 2021-12-31 PROCEDURE — 99283 EMERGENCY DEPT VISIT LOW MDM: CPT | Mod: ER

## 2021-12-31 RX ORDER — CLONIDINE HYDROCHLORIDE 0.1 MG/1
0.1 TABLET ORAL
Status: COMPLETED | OUTPATIENT
Start: 2021-12-31 | End: 2021-12-31

## 2021-12-31 RX ORDER — CAPTOPRIL 12.5 MG/1
25 TABLET ORAL
Status: COMPLETED | OUTPATIENT
Start: 2021-12-31 | End: 2021-12-31

## 2021-12-31 RX ORDER — GABAPENTIN 100 MG/1
100 CAPSULE ORAL 3 TIMES DAILY
Qty: 21 CAPSULE | Refills: 0 | Status: SHIPPED | OUTPATIENT
Start: 2021-12-31 | End: 2022-01-07

## 2021-12-31 RX ADMIN — CLONIDINE HYDROCHLORIDE 0.1 MG: 0.1 TABLET ORAL at 10:12

## 2021-12-31 RX ADMIN — CAPTOPRIL 25 MG: 12.5 TABLET ORAL at 10:12

## 2021-12-31 NOTE — ED PROVIDER NOTES
Encounter Date: 12/31/2021       History     Chief Complaint   Patient presents with    Arm Pain     Left arm pain for two weeks. I went to urgent care four days ago but they didn't have an xray machine. They gave me anti inflammatory and a steroid but it isnt helping. Denies any specific injury.      Nam William is a 66 y.o. female who  has a past medical history of Diabetes mellitus, type 2 and Hypertension.    The patient presents to the ED due to left arm pain for the past 2 weeks.  She reports no acute change today but was having persistent pain so wanted to get checked out.  At the onset of her symptoms she was seen by urgent care was unable to obtain x-rays and was dischargedr with symptomatic treatment - anti inflammatories/muscle relaxer.  She states this has not worked.  She denies any numbness dizziness vision changes or headaches.  Her pain is worse with movement and worse at night.  It radiates down from her left shoulder down to her left elbow.        Review of patient's allergies indicates:  No Known Allergies  Past Medical History:   Diagnosis Date    Diabetes mellitus, type 2     Hypertension      No past surgical history on file.  No family history on file.  Social History     Tobacco Use    Smoking status: Never Smoker   Substance Use Topics    Alcohol use: No    Drug use: No     Review of Systems    Physical Exam     Initial Vitals [12/31/21 0754]   BP Pulse Resp Temp SpO2   (!) 217/98 83 15 98 °F (36.7 °C) 98 %      MAP       --         Physical Exam    Constitutional: No distress.   HENT:   Head: Normocephalic and atraumatic.   Eyes: Conjunctivae are normal.   Neck:   No bruit    Cardiovascular: Intact distal pulses.   Pulmonary/Chest: No respiratory distress.   Musculoskeletal:      Comments: Positive Spurling test. Sensation intact to light touch to bilateral upper extremities.  Equal strength to bilateral upper extremities.  Diffuse neck/shoulder tenderness.     Neurological: She is  alert. She has normal strength. No cranial nerve deficit or sensory deficit.   Skin: Skin is warm and dry.   Psychiatric: She has a normal mood and affect.         ED Course   Procedures  Labs Reviewed - No data to display       Imaging Results          X-Ray Cervical Spine AP And Lateral (Final result)  Result time 12/31/21 09:24:09    Final result by Howard Spencer MD (12/31/21 09:24:09)                 Impression:      Degenerative changes as above.  No acute findings.      Electronically signed by: Howard Spencer MD  Date:    12/31/2021  Time:    09:24             Narrative:    EXAMINATION:  XR CERVICAL SPINE AP LATERAL    CLINICAL HISTORY:  .  Pain in arm, unspecified.  Neck pain.    TECHNIQUE:  3 views.    COMPARISON:  None    FINDINGS:  The vertebral body heights and alignment are within normal limits.  Mild degenerative disc disease at C5-6 and moderate degenerative disc disease at C6-7 with endplate sclerosis and disc space narrowing.  Anterior spurring at C5-6 and C6-7. No acute fracture or subluxation.  No soft tissue abnormality detected. Carotid artery calcifications.                               X-Ray Shoulder 2 or More Views Left (Final result)  Result time 12/31/21 09:23:20   Procedure changed from X-Ray Shoulder Trauma Left     Final result by Howard Spencer MD (12/31/21 09:23:20)                 Impression:      Acromioclavicular arthrosis.      Electronically signed by: Howard Spencer MD  Date:    12/31/2021  Time:    09:23             Narrative:    EXAMINATION:  XR SHOULDER COMPLETE 2 OR MORE VIEWS LEFT    CLINICAL HISTORY:  pain;- Pain in left shoulder.    COMPARISON:  None    FINDINGS:  Mild acromioclavicular arthrosis.  The glenohumeral joint space is maintained.  Negative for fracture or dislocation.                                 Medications   captopriL tablet 25 mg (25 mg Oral Given 12/31/21 1009)   cloNIDine tablet 0.1 mg (0.1 mg Oral Given 12/31/21 1009)     Medical Decision Making:   Initial  Assessment:   69-year-old female with 2 weeks of neck and shoulder pain.  Vital signs is stable.  Neurovascularly intact.  No acute change or new symptoms today.  No dizziness or vision changes to suggest dissection/CVA.  On further questioning patient reports pain after dancing.  Given her exam suspect radicular type pain.  We will plan to obtain x-rays and reassess.  Differential Diagnosis:   Differential Diagnosis includes, but is not limited to:  Fracture, dislocation, compartment syndrome, nerve injury/palsy, vascular injury, rhabdomyolysis, hemarthrosis, septic joint, bursitis, muscle strain, ligament tear/sprain, laceration with foreign body, abrasion, soft tissue contusion, osteoarthritis, CVA, acute cord compression, dissection  ED Management:  X-rays demonstrate degenerative changes of neck and shoulder.  Patient's blood pressure is elevated however BC did not take her medications.  Her blood pressure improved at reassessment.  Advised she follow up with her PCP for improved blood pressure control.  Suspect radicular pain.  Advise she follow-up closely with her PCP/neurosurgery as needed.  Discussed return precautions for progressive symptoms including numbness weakness dizziness vision changes or any other concerns.    After taking into careful account the historical factors and physical exam findings of the patient's presentation today, in conjunction with the empirical and objective data obtained on ED workup, no acute emergent medical condition has been identified. The patient appears to be low risk for an emergent medical condition and I feel it is safe and appropriate at this time for the patient to be discharged to follow-up as detailed in their discharge instructions for reevaluation and possible continued outpatient workup and management. I have discussed the specifics of the workup with the patient and the patient has verbalized understanding of the details of the workup, the diagnosis, the  treatment plan, and the need for outpatient follow-up.  Although the patient has no emergent etiology today this does not preclude the development of an emergent condition so in addition, I have advised the patient that they can return to the ED and/or activate EMS at any time with worsening of their symptoms, change of their symptoms, or with any other medical complaint.  The patient remained comfortable and stable during their visit in the ED.  Discharge and follow-up instructions discussed with the patient who expressed understanding and willingness to comply with my recommendations.                        Clinical Impression:   Final diagnoses:  [M79.603] Arm pain  [M25.512] Left shoulder pain  [M54.12] Cervical radiculopathy (Primary)          ED Disposition Condition    Discharge Stable        ED Prescriptions     Medication Sig Dispense Start Date End Date Auth. Provider    gabapentin (NEURONTIN) 100 MG capsule Take 1 capsule (100 mg total) by mouth 3 (three) times daily. for 7 days 21 capsule 12/31/2021 1/7/2022 Leoncio Panda Jr., MD        Follow-up Information     Follow up With Specialties Details Why Contact Info Additional Information    David Montague MD Family Medicine In 3 days  [address]  [facility] [address]  [phone]       [facility] Schedule an appointment as soon as possible for a visit   [address] [facility] [address]  [facility] [address] [phone] Please park in [address] or [address] and use [address]. Take Medical Office Bldg. elevators.          Portions of this note were dictated using voice recognition software and may contain dictation related errors in spelling/grammar/syntax not found on text review       Leoncio Panda Jr., MD  01/01/22 [id number]

## 2022-01-10 ENCOUNTER — OFFICE VISIT (OUTPATIENT)
Dept: FAMILY MEDICINE | Facility: HOSPITAL | Age: 67
End: 2022-01-10
Attending: FAMILY MEDICINE
Payer: MEDICARE

## 2022-01-10 ENCOUNTER — LAB VISIT (OUTPATIENT)
Dept: LAB | Facility: HOSPITAL | Age: 67
End: 2022-01-10
Attending: FAMILY MEDICINE
Payer: MEDICARE

## 2022-01-10 VITALS
DIASTOLIC BLOOD PRESSURE: 105 MMHG | BODY MASS INDEX: 37.83 KG/M2 | WEIGHT: 227.06 LBS | HEIGHT: 65 IN | SYSTOLIC BLOOD PRESSURE: 197 MMHG | HEART RATE: 81 BPM

## 2022-01-10 DIAGNOSIS — I10 ESSENTIAL HYPERTENSION: Primary | ICD-10-CM

## 2022-01-10 DIAGNOSIS — I10 ESSENTIAL HYPERTENSION: ICD-10-CM

## 2022-01-10 DIAGNOSIS — M25.512 ACUTE PAIN OF LEFT SHOULDER: ICD-10-CM

## 2022-01-10 DIAGNOSIS — E11.9 TYPE 2 DIABETES MELLITUS WITHOUT COMPLICATION, WITHOUT LONG-TERM CURRENT USE OF INSULIN: ICD-10-CM

## 2022-01-10 LAB
ALBUMIN SERPL BCP-MCNC: 4.1 G/DL (ref 3.5–5.2)
ALP SERPL-CCNC: 80 U/L (ref 55–135)
ALT SERPL W/O P-5'-P-CCNC: 29 U/L (ref 10–44)
ANION GAP SERPL CALC-SCNC: 8 MMOL/L (ref 8–16)
AST SERPL-CCNC: 25 U/L (ref 10–40)
BASOPHILS # BLD AUTO: 0.07 K/UL (ref 0–0.2)
BASOPHILS NFR BLD: 0.9 % (ref 0–1.9)
BILIRUB SERPL-MCNC: 0.5 MG/DL (ref 0.1–1)
BUN SERPL-MCNC: 16 MG/DL (ref 8–23)
CALCIUM SERPL-MCNC: 9.8 MG/DL (ref 8.7–10.5)
CHLORIDE SERPL-SCNC: 103 MMOL/L (ref 95–110)
CO2 SERPL-SCNC: 32 MMOL/L (ref 23–29)
CREAT SERPL-MCNC: 1.1 MG/DL (ref 0.5–1.4)
DIFFERENTIAL METHOD: ABNORMAL
EOSINOPHIL # BLD AUTO: 0.2 K/UL (ref 0–0.5)
EOSINOPHIL NFR BLD: 2.4 % (ref 0–8)
ERYTHROCYTE [DISTWIDTH] IN BLOOD BY AUTOMATED COUNT: 12.5 % (ref 11.5–14.5)
EST. GFR  (AFRICAN AMERICAN): >60 ML/MIN/1.73 M^2
EST. GFR  (NON AFRICAN AMERICAN): 53 ML/MIN/1.73 M^2
ESTIMATED AVG GLUCOSE: 246 MG/DL (ref 68–131)
GLUCOSE SERPL-MCNC: 139 MG/DL (ref 70–110)
HBA1C MFR BLD: 10.2 % (ref 4–5.6)
HCT VFR BLD AUTO: 47 % (ref 37–48.5)
HGB BLD-MCNC: 15.2 G/DL (ref 12–16)
IMM GRANULOCYTES # BLD AUTO: 0.01 K/UL (ref 0–0.04)
IMM GRANULOCYTES NFR BLD AUTO: 0.1 % (ref 0–0.5)
LYMPHOCYTES # BLD AUTO: 2.2 K/UL (ref 1–4.8)
LYMPHOCYTES NFR BLD: 27.9 % (ref 18–48)
MCH RBC QN AUTO: 27.9 PG (ref 27–31)
MCHC RBC AUTO-ENTMCNC: 32.3 G/DL (ref 32–36)
MCV RBC AUTO: 86 FL (ref 82–98)
MONOCYTES # BLD AUTO: 0.6 K/UL (ref 0.3–1)
MONOCYTES NFR BLD: 8 % (ref 4–15)
NEUTROPHILS # BLD AUTO: 4.9 K/UL (ref 1.8–7.7)
NEUTROPHILS NFR BLD: 60.7 % (ref 38–73)
NRBC BLD-RTO: 0 /100 WBC
PLATELET # BLD AUTO: 167 K/UL (ref 150–450)
PMV BLD AUTO: 11.6 FL (ref 9.2–12.9)
POTASSIUM SERPL-SCNC: 3.9 MMOL/L (ref 3.5–5.1)
PROT SERPL-MCNC: 7.7 G/DL (ref 6–8.4)
RBC # BLD AUTO: 5.45 M/UL (ref 4–5.4)
SODIUM SERPL-SCNC: 143 MMOL/L (ref 136–145)
WBC # BLD AUTO: 8.04 K/UL (ref 3.9–12.7)

## 2022-01-10 PROCEDURE — 85025 COMPLETE CBC W/AUTO DIFF WBC: CPT | Performed by: STUDENT IN AN ORGANIZED HEALTH CARE EDUCATION/TRAINING PROGRAM

## 2022-01-10 PROCEDURE — 36415 COLL VENOUS BLD VENIPUNCTURE: CPT | Performed by: STUDENT IN AN ORGANIZED HEALTH CARE EDUCATION/TRAINING PROGRAM

## 2022-01-10 PROCEDURE — 83036 HEMOGLOBIN GLYCOSYLATED A1C: CPT | Performed by: STUDENT IN AN ORGANIZED HEALTH CARE EDUCATION/TRAINING PROGRAM

## 2022-01-10 PROCEDURE — 99214 OFFICE O/P EST MOD 30 MIN: CPT | Performed by: STUDENT IN AN ORGANIZED HEALTH CARE EDUCATION/TRAINING PROGRAM

## 2022-01-10 PROCEDURE — 80053 COMPREHEN METABOLIC PANEL: CPT | Performed by: STUDENT IN AN ORGANIZED HEALTH CARE EDUCATION/TRAINING PROGRAM

## 2022-01-10 RX ORDER — METHOCARBAMOL 500 MG/1
1000 TABLET, FILM COATED ORAL 3 TIMES DAILY
COMMUNITY
Start: 2021-12-27 | End: 2022-09-02

## 2022-01-10 RX ORDER — MELOXICAM 15 MG/1
15 TABLET ORAL DAILY
COMMUNITY
Start: 2021-12-27 | End: 2022-09-02

## 2022-01-10 RX ORDER — GABAPENTIN 300 MG/1
300 CAPSULE ORAL 3 TIMES DAILY
Qty: 90 CAPSULE | Refills: 11 | Status: SHIPPED | OUTPATIENT
Start: 2022-01-10 | End: 2022-01-10

## 2022-01-10 RX ORDER — GABAPENTIN 300 MG/1
300 CAPSULE ORAL 3 TIMES DAILY
Qty: 90 CAPSULE | Refills: 0 | Status: SHIPPED | OUTPATIENT
Start: 2022-01-10 | End: 2022-01-25

## 2022-01-10 RX ORDER — GABAPENTIN 300 MG/1
300 CAPSULE ORAL 3 TIMES DAILY
Qty: 90 CAPSULE | Refills: 11 | Status: SHIPPED | OUTPATIENT
Start: 2022-01-10 | End: 2022-01-25

## 2022-01-11 ENCOUNTER — TELEPHONE (OUTPATIENT)
Dept: FAMILY MEDICINE | Facility: HOSPITAL | Age: 67
End: 2022-01-11
Payer: MEDICARE

## 2022-01-11 NOTE — PROGRESS NOTES
[facility]  History & Physical    SUBJECTIVE:     Chief Complaint:   Chief Complaint   Patient presents with    Arm Pain       History of Present Illness:  66 y.o. female who  has a past medical history of Diabetes mellitus, type 2 and Hypertension. presents to clinic today for L shoulder and arm pain. Pt was seen in an urgent care ~12/27/21 and was given a steroid shot as well as roboxin and naproxen though she did not have relief. She then presented to the ED 12/31/21 with similar sx's and was able to get XR's of her L arm as well as C spine. XR of the C spine showed degenerative changes and anterior spurring to C5-6 and 6-7, and the Xray of her shoulder showed mild acromioclavicular arthrosis but no other abnormalities. She was given a prescription for Gabapentin 100mg TID which she reports improvement in her sx's while taking. She denies any recent trauma or falls, and describes the pain as constant, throbbing, and sometimes burning pain which starts in her L shoulder rotates around her biceps and then stops over her forearm. She denies any decreased RoM, sensations, or strength in her upper extremity or hand, and states that the pain is not worse with movement. She denies any other complaints at this time.       Allergies:  Review of patient's allergies indicates:  No Known Allergies    Home Medications:  Current Outpatient Medications on File Prior to Visit   Medication Sig    atorvastatin (LIPITOR) 40 MG tablet Take 1 tablet (40 mg total) by mouth once daily.    blood sugar diagnostic Strp To check BG 4 times daily, to use with insurance preferred meter    ibuprofen (ADVIL,MOTRIN) 800 MG tablet Take 1 tablet (800 mg total) by mouth daily as needed.    lancets Misc To check BG 4 times daily, to use with insurance preferred meter    lisinopriL-hydrochlorothiazide (PRINZIDE,ZESTORETIC) 20-25 mg Tab Take 1 tablet by mouth once daily.    meloxicam (MOBIC) 15 MG tablet Take 15 mg by mouth once  daily.    metFORMIN (GLUCOPHAGE) 1000 MG tablet Take 1 tablet (1,000 mg total) by mouth 2 (two) times daily.    methocarbamoL (ROBAXIN) 500 MG Tab Take 1,000 mg by mouth 3 (three) times daily.    metoprolol tartrate (LOPRESSOR) 50 MG tablet Take 1 tablet (50 mg total) by mouth 2 (two) times daily.    blood-glucose meter kit To check BG 4 times daily, to use with insurance preferred meter    [DISCONTINUED] empagliflozin (JARDIANCE) 10 mg tablet Take 1 tablet (10 mg total) by mouth once daily. (Patient not taking: Reported on 1/10/2022)    [DISCONTINUED] naproxen (NAPROSYN) 500 MG tablet SMARTSI Tablet(s) By Mouth Every 12 Hours     No current facility-administered medications on file prior to visit.       Past Medical History:   Diagnosis Date    Diabetes mellitus, type 2     Hypertension      No past surgical history on file.  No family history on file.  Social History     Tobacco Use    Smoking status: Never Smoker    Smokeless tobacco: Never Used   Substance Use Topics    Alcohol use: No    Drug use: No        Review of Systems   Constitutional: Negative for chills, fever and malaise/fatigue.   HENT: Negative for congestion, sinus pain and sore throat.    Eyes: Negative for blurred vision, double vision and photophobia.   Respiratory: Negative for cough, sputum production and shortness of breath.    Cardiovascular: Negative for chest pain, palpitations and leg swelling.   Gastrointestinal: Negative for abdominal pain, constipation, diarrhea, nausea and vomiting.   Genitourinary: Negative for dysuria and hematuria.   Musculoskeletal: Positive for myalgias (L shoulder and arm pain).   Skin: Negative for rash.   Neurological: Negative for sensory change, weakness and headaches.        OBJECTIVE:     Vital Signs:  Pulse: 81 (01/10/22 1550)  BP: (!) 197/105 (01/10/22 1550)    Physical Exam  Vitals reviewed.   Constitutional:       General: She is not in acute distress.  HENT:      Head: Normocephalic and  atraumatic.      Right Ear: External ear normal.      Left Ear: External ear normal.      Nose: Nose normal.   Eyes:      Conjunctiva/sclera: Conjunctivae normal.      Pupils: Pupils are equal, round, and reactive to light.   Cardiovascular:      Rate and Rhythm: Normal rate and regular rhythm.      Pulses: Normal pulses.      Heart sounds: Normal heart sounds. No murmur heard.  No friction rub. No gallop.    Pulmonary:      Effort: Pulmonary effort is normal.      Breath sounds: Normal breath sounds. No wheezing, rhonchi or rales.   Abdominal:      General: Bowel sounds are normal.      Palpations: There is no mass.      Tenderness: There is no abdominal tenderness.   Musculoskeletal:         General: No swelling, tenderness, deformity or signs of injury. Normal range of motion.      Cervical back: Normal range of motion.   Lymphadenopathy:      Cervical: No cervical adenopathy.   Skin:     General: Skin is warm and dry.      Findings: No rash.   Neurological:      General: No focal deficit present.      Mental Status: She is alert.         Laboratory:  Hemoglobin A1C   Date Value Ref Range Status   01/10/2022 10.2 (H) 4.0 - 5.6 % Final     Comment:     ADA Screening Guidelines:  5.7-6.4%  Consistent with prediabetes  >or=6.5%  Consistent with diabetes    High levels of fetal hemoglobin interfere with the HbA1C  assay. Heterozygous hemoglobin variants (HbS, HgC, etc)do  not significantly interfere with this assay.   However, presence of multiple variants may affect accuracy.     03/22/2021 9.8 (H) 4.0 - 5.6 % Final     Comment:     ADA Screening Guidelines:  5.7-6.4%  Consistent with prediabetes  >or=6.5%  Consistent with diabetes    High levels of fetal hemoglobin interfere with the HbA1C  assay. Heterozygous hemoglobin variants (HbS, HgC, etc)do  not significantly interfere with this assay.   However, presence of multiple variants may affect accuracy.     05/18/2020 11.3 (H) 4.0 - 5.6 % Final     Comment:     ADA  Screening Guidelines:  5.7-6.4%  Consistent with prediabetes  >or=6.5%  Consistent with diabetes  High levels of fetal hemoglobin interfere with the HbA1C  assay. Heterozygous hemoglobin variants (HbS, HgC, etc)do  not significantly interfere with this assay.   However, presence of multiple variants may affect accuracy.         A/P:  Nam was seen today for arm pain.    Diagnoses and all orders for this visit:    Essential hypertension  -     CBC Auto Differential; Future  -     Comprehensive Metabolic Panel; Future    Type 2 diabetes mellitus without complication, without long-term current use of insulin  -     CBC Auto Differential; Future  -     Comprehensive Metabolic Panel; Future  -     Hemoglobin A1C; Future    Acute pain of left shoulder  Pt with constant unchanged pain in L shoulder w/ imaging showing only  degenerative changes. Pt did report improvement in sx's with mild dose gabapentin so discussed increasing dose at this time and encouraging PT to help evaluate further. She has not had blood work in >6mo so it was repeated today prior to use of increased gabapentin dose   -     Ambulatory referral/consult to Physical/Occupational Therapy; Future  -     Discontinue: gabapentin (NEURONTIN) 300 MG capsule; Take 1 capsule (300 mg total) by mouth 3 (three) times daily.  -     gabapentin (NEURONTIN) 300 MG capsule; Take 1 capsule (300 mg total) by mouth 3 (three) times daily.  -     gabapentin (NEURONTIN) 300 MG capsule; Take 1 capsule (300 mg total) by mouth 3 (three) times daily.      No follow-ups on file.      Ulysses West MD  [facility], PGY-1  01/10/2022

## 2022-01-11 NOTE — TELEPHONE ENCOUNTER
Called pt about her lab work results including her increased A1C of 10.2. Pt reports that she had previously taken metformin 1000mg BID as well as jardiance though she stopped the jardiance and decreased her metformin to only qd a few months ago. She states she is not interested in any medication which she needs to inject herself, but is willing to increase her metformin back to the BID dosing. I encouraged her to keep checking her BG at home and make an apt to return to clinic in the next week, so we can further discuss her DM regiment.     Ulysses West MD  [facility], PGY-1  1:54 PM, 1/11/21

## 2022-01-11 NOTE — PROGRESS NOTES
I assume primary medical responsibility for this patient. I have reviewed the history, physical, and assessement & treatment plan with the resident and agree that the care is reasonable and necessary. This service has been performed by a resident without the presence of a teaching physician under the primary care exception. If necessary, an addendum of additional findings or evaluation beyond the resident documentation will be noted below.    Patient here for chronic disease management as well as left arm pain. Increased gabapentin for likely cervical radiculopathy. Consider assessing for trigger points in the future.     Cordelia Roberts MD    [facility]

## 2022-01-14 ENCOUNTER — CLINICAL SUPPORT (OUTPATIENT)
Dept: REHABILITATION | Facility: HOSPITAL | Age: 67
End: 2022-01-14
Attending: STUDENT IN AN ORGANIZED HEALTH CARE EDUCATION/TRAINING PROGRAM
Payer: MEDICARE

## 2022-01-14 DIAGNOSIS — M25.512 ACUTE PAIN OF LEFT SHOULDER: ICD-10-CM

## 2022-01-14 DIAGNOSIS — R29.898 IMPAIRED STRENGTH OF UPPER EXTREMITY: ICD-10-CM

## 2022-01-14 PROBLEM — M25.511 ACUTE PAIN OF RIGHT SHOULDER: Status: ACTIVE | Noted: 2022-01-14

## 2022-01-14 PROCEDURE — 97110 THERAPEUTIC EXERCISES: CPT | Mod: PO

## 2022-01-14 PROCEDURE — 97165 OT EVAL LOW COMPLEX 30 MIN: CPT | Mod: PO

## 2022-01-14 NOTE — PATIENT INSTRUCTIONS
MEDIAN NERVE GLIDE        With right elbow bent and palm facing up as if holding a tray, head tilted away. Simultaneously straighten arm and tilt head toward involved shoulder.  Do 2 sets of 10 repetitions per session. Do 3 sessions per day.     Chin tucks 2 sets of 15      SCAPULAR RETRACTIONS    Move your shoulder blades back and down. Hold, relax and repeat.   Repeat 10 Times  Hold 3 Seconds  Complete 1 Set  Perform 3 Times a Day      SHOULDER ROLLS    Move your shoulders in a circular pattern as shown so that your are moving in an up, back and down direction. Perform small circles if needed for comfort.  Repeat 10 Times  Hold 1 Second  Complete 1 Set  Perform 3 Times a Day      SHRUGS    Raise your shoulders upward towards your ears as shown. Shrug both shoulders at the same time.  Repeat 10 Times  Hold 3 Seconds  Complete 1 Set  Perform 3 Times a Day

## 2022-01-14 NOTE — PLAN OF CARE
[facility]  Occupational Therapy Initial Evaluation    Date: 1/14/2022  Name: Nam William  Clinic Number: [id number]    Therapy Diagnosis:   Encounter Diagnoses   Name Primary?    Acute pain of left shoulder     Impaired strength of upper extremity      Physician: Ulysses West MD    Physician Orders: eval and treat   Medical Diagnosis: M25.512 (ICD-10-CM) - Acute pain of left shoulder  Surgical Procedure and Date: NA  Evaluation Date: 1/14/2022  Insurance Authorization Period Expiration: 1/31/2022  Plan of Care Certification Period: to 3/11/2022    Date of Return to MD: 1/25/2022  Visit # / Visits authorized: 1/1  FOTO: 1/14/2022 /37%    Precautions:  Diabetes    Time In: 10:15  Time Out: 11:00  Total Appointment Time (timed & untimed codes): 45 minutes    SUBJECTIVE     Date of Onset: late December     History of Current Condition/Mechanism of Injury: Nam reports: Pt she has had some arm pain for awhile now that begins in her shoulder and radiates down her forearm. She originally tried to take alive for her symptoms. She went urgent care around 12/27/21 and was given a steroid shot but did not have relief. She then presented to the ED 12/31/21 with similar sx's and was able to get XR's of her L arm as well as C spine. XR of the C spine showed degenerative changes and anterior spurring to C5-6 and 6-7, and the Xray of her shoulder showed mild acromioclavicular arthrosis but no other abnormalities    Falls: na    Involved Side: Left  Dominant Side: Right  Imaging: X-ray 12/31/202:  The vertebral body heights and alignment are within normal limits.  Mild degenerative disc disease at C5-6 and moderate degenerative disc disease at C6-7 with endplate sclerosis and disc space narrowing.  Anterior spurring at C5-6 and C6-7. No acute fracture or subluxation.  No soft tissue abnormality detected. Carotid artery calcifications. Mild acromioclavicular arthrosis.  The glenohumeral joint  space is maintained.  Negative for fracture or dislocation.     Prior Therapy:  NA    Occupation:  Retired   Working presently: unemployed  Duties: house hold duties    Functional Limitations/Social History:    Previous functional status includes: Independent with all ADLs.     Current Functional Status   Home/Living environment: lives alone      Limitation of Functional Status as follows:   ADLs/IADLs:     - Feeding: Independent     - Bathing: independent     - Dressing/Grooming: independent     - Driving: independent     Pain:  Functional Pain Scale Rating 0-10: Current 2/10, worst 10/10, best 0/10   Location: begins in shoulder and radiates down to her forearm   Description: Burning and Throbbing  Aggravating Factors: Morning, Extension and Flexing  Easing Factors: pain medication    Patient's Goals for Therapy: no pain     Medical History:   Past Medical History:   Diagnosis Date    Diabetes mellitus, type 2     Hypertension        Surgical History:    has no past surgical history on file.    Medications:   has a current medication list which includes the following prescription(s): atorvastatin, blood sugar diagnostic, blood-glucose meter, gabapentin, gabapentin, ibuprofen, lancets, lisinopril-hydrochlorothiazide, meloxicam, metformin, methocarbamol, and metoprolol tartrate.    Allergies:   Review of patient's allergies indicates:  No Known Allergies       OBJECTIVE     Sensation Test: Patient denies any numbness/tingling    Range of Motion:   Shoulder Right  Left     AROM MMT AROM MMT   flexion WFL 4+/5 WFL 4+/5   extension WFL 4+/5 WFL 4+/5   abduction WFL 4+/5 WFL 4+/5   adduction WFL 4+/5 WFL 4+/5   Internal rotation WFL 4+/5 WFL 4+/5   ER at 90° abd WFL 4+/5 WFL 4+/5   ER at 0° abd           Cervical ROM 1/14/2022   Flexion 40   Extension 35   Right  Side bending  30   Left Side Bending 32   Right Rotation 65   Left Rotation  60     Special Tests:  Negative: Drop Arm Test, Neer Impingement, dropping sign  and lift off sign    Palpation: (for pain)     Negative: SC joint, Coracoid process, Lateral Subacromial Space, Anterior Subacromial Space, Posterior Subacromial Space, Infraspinatus Region, AC joint, Bicipital Groove and Supraspinatus Region    Strength (in pounds):   Left  1/14/2022 Right  1/14/2022    II 50 60   Lateral 11 11   Tripod 10 11   Tip 5 8         Limitation/Restriction for FOTO Shoulder Survey    Therapist reviewed FOTO scores for Nam William on 1/14/2022.   FOTO documents entered into [employer] - see Media section.    Limitation Score: 37%         Treatment   Total Treatment time (time-based codes) separate from Evaluation: 15 minutes    Nam received the treatments listed below:     Nam William received therapeutic exercises for 15 minutes including:    Exercise  Repetitions        Median nerve glides  x10   Ulnar nerve glides  x10   Radial nerve glides  x10   Scapular elevation x10   Scapular retraction  x10   Shoudler rolls  x10             Patient Education and Home Exercises      Education provided:   - nerve anatomy     Written Home Exercises Provided: yes.  Exercises were reviewed and Nam was able to demonstrate them prior to the end of the session.  Nam demonstrated good  understanding of the education provided. See EMR under Patient Instructions for exercises provided during therapy sessions.     Pt was advised to perform these exercises free of pain, and to stop performing them if pain occurs.    Patient/Family Education: role of OT, goals for OT, scheduling/cancellations - pt verbalized understanding. Discussed insurance limitations with patient.      ASSESSMENT     Nam William is a 66 y.o. female referred to outpatient occupational therapy and presents with a medical diagnosis of M25.512 (ICD-10-CM) - Acute pain of left shoulder.  Patient presents with the following therapy deficits: Increased pain and Joint Stiffness and demonstrates limitations as described in the chart below.  Following medical record review it is determined that pt will benefit from occupational therapy services in order to maximize pain free and/or functional use of right upper extremity and neck. The following goals were discussed with the patient and patient is in agreement with them as to be addressed in the treatment plan. The patient's rehab potential is Good.     Anticipated barriers to occupational therapy: diabetes   Pt has no cultural, educational or language barriers to learning provided.    Profile and History Assessment of Occupational Performance Level of Clinical Decision Making Complexity Score   Occupational Profile:   Nam William is a 66 y.o. female who lives alone and is retired Nam William has difficulty with  ADLs and IADLs as listed previously, which  [facility].      Comorbidities:    has a past medical history of Diabetes mellitus, type 2 and Hypertension.    Medical and Therapy History Review:   Brief               Performance Deficits    Physical:   Strength  Pain    Cognitive:  No Deficits    Psychosocial:    No Deficits     Clinical Decision Making:  low    Assessment Process:  Problem-Focused Assessments    Modification/Need for Assistance:  Not Necessary    Intervention Selection:  Several Treatment Options       low  Based on PMHX, co morbidities , data from assessments and functional level of assistance required with task and clinical presentation directly impacting function.       The following goals were discussed with the patient and patient is in agreement with them as to be addressed in the treatment plan.     Goals:   Short Term Goals: (in 4 weeks)  1) Patient will be independent in HEP  2) Decrease pain in back and upper extremity to no more than 6/10 worst in ADL/IADL's  3) Increase AROM in cervical flexion by 5 degrees for improved functioning in ADL/IADL's  4) Increase left  strength by 5 psi for increased functional use    Long Term  Goals: (in 8 weeks)  1) Decrease pain in  back and upper extremity to no more than 2/10 worst in ADL/IADL's  2) Increase AROM of cervical left rotation to WFL for increased functioning in ADL/IADL's  3) Pt will report no issues sleeping through the night due to decrease pain   4) Increased functioning in ADL/IADL's, as evidenced by a FOTO impairment rating of no more than 30%      PLAN   Plan of Care Certification: 1/14/2022 to 3/11/2022.     Outpatient Occupational Therapy 2 times weekly for 8 weeks to include the following interventions: Manual therapy/joint mobilizations, Modalities for pain management, Therapeutic exercises/activities., Strengthening, Joint Protection and Energy Conservation.    Wayne London OT      I CERTIFY THE NEED FOR THESE SERVICES FURNISHED UNDER THIS PLAN OF TREATMENT AND WHILE UNDER MY CARE  Physician's comments:      Physician's Signature: ___________________________________________________

## 2022-01-19 ENCOUNTER — CLINICAL SUPPORT (OUTPATIENT)
Dept: REHABILITATION | Facility: HOSPITAL | Age: 67
End: 2022-01-19
Attending: STUDENT IN AN ORGANIZED HEALTH CARE EDUCATION/TRAINING PROGRAM
Payer: MEDICARE

## 2022-01-19 DIAGNOSIS — R29.898 IMPAIRED STRENGTH OF UPPER EXTREMITY: ICD-10-CM

## 2022-01-19 DIAGNOSIS — M25.511 ACUTE PAIN OF RIGHT SHOULDER: ICD-10-CM

## 2022-01-19 PROCEDURE — 97110 THERAPEUTIC EXERCISES: CPT | Mod: PO

## 2022-01-19 NOTE — PROGRESS NOTES
[facility]  Occupational Therapy Treatment Note    Date: 1/19/2022  Name: Nam William  Clinic Number: [id number]    Therapy Diagnosis:   Encounter Diagnoses   Name Primary?    Acute pain of right shoulder     Impaired strength of upper extremity      Physician: Ulysses West MD    Physician Orders: eval and treat   Medical Diagnosis: M25.512 (ICD-10-CM) - Acute pain of left shoulder  Surgical Procedure and Date: NA  Evaluation Date: 1/14/2022  Insurance Authorization Period Expiration: 12/31/2022  Plan of Care Certification Period: to 3/11/2022     Date of Return to MD: 1/25/2022  Visit # / Visits authorized: 1/20 (2 total)  FOTO: 1/14/2022 /37%     Precautions:  Diabetes     Time In: 7:55  Time Out: 8:44  Total Appointment Time (timed & untimed codes):49 minutes (3 TE)      SUBJECTIVE     Pt reports: she had some throbbing pain on the dorsum of her hand last night   She was compliant with home exercise program given last session.   Response to previous treatment: None noted at first treatment after Initial Evaluation  Functional change: No adverse reactions noted or reported    Pain: 2/10  Location: left upper extremity     OBJECTIVE     Objective Measures updated at progress report unless specified.    Treatment     Nam received the treatments listed below:     Nam William received therapeutic exercises for 49 minutes including:    Exercise  Repetitions        UBE 6 min   Median Nerve Glides  x10   Radial Nerve Glides x10   Ulnar Nerve Glides  x10   Rows  Red   2 sets of 15   Ball roll outs in sitting  x15   Chin tucks  x15   Lateral cervical stretch  10 second holds; 5 each side   Scapular elevation  2 sets of 10   Shoulder extension  Red   2 sets of 15           Patient Education and Home Exercises      Education provided:   - radiculopathy   - Progress towards goals     Written Home Exercises Provided: Patient instructed to cont prior HEP.  Exercises were reviewed and  Nam was able to demonstrate them prior to the end of the session.  Nam demonstrated good  understanding of the HEP provided. See EMR under Patient Instructions for exercises provided during therapy sessions.       Assessment     The patient tolerated therapy well on this date. She reports pain that begin on the medial border of her left scapula that radiates down to her forearm.  She performed all therapeutic exercises without any limitations or complaints of pain. Therapeutic exercises were focused on nerve glide and scapular strengthening exercises to decrease her pain that radiates down her arm. Cervical stretches were also introduced on this date with good tolerance. Nam is progressing well towards her goals and there are no updates to goals at this time. Pt prognosis is Good.     Pt will continue to benefit from skilled outpatient occupational therapy to address the deficits listed in the problem list on initial evaluation provide pt/family education and to maximize pt's level of independence in the home and community environment.     Pt's spiritual, cultural and educational needs considered and pt agreeable to plan of care and goals.    Anticipated barriers to occupational therapy: diabetes     Goals:   Short Term Goals: (in 4 weeks)  1) Patient will be independent in HEP  2) Decrease pain in back and upper extremity to no more than 6/10 worst in ADL/IADL's  3) Increase AROM in cervical flexion by 5 degrees for improved functioning in ADL/IADL's  4) Increase left  strength by 5 psi for increased functional use     Long Term Goals: (in 8 weeks)  1) Decrease pain in  back and upper extremity to no more than 2/10 worst in ADL/IADL's  2) Increase AROM of cervical left rotation to WFL for increased functioning in ADL/IADL's  3) Pt will report no issues sleeping through the night due to decrease pain   4) Increased functioning in ADL/IADL's, as evidenced by a FOTO impairment rating of no more than 30%         PLAN   Plan of Care Certification: 1/14/2022 to 3/11/2022.      Outpatient Occupational Therapy 2 times weekly for 8 weeks to include the following interventions: Manual therapy/joint mobilizations, Modalities for pain management, Therapeutic exercises/activities., Strengthening, Joint Protection and Energy Conservation.         Wayne London, OT

## 2022-01-25 ENCOUNTER — OFFICE VISIT (OUTPATIENT)
Dept: FAMILY MEDICINE | Facility: HOSPITAL | Age: 67
End: 2022-01-25
Attending: FAMILY MEDICINE
Payer: MEDICARE

## 2022-01-25 VITALS — WEIGHT: 224.19 LBS | BODY MASS INDEX: 37.35 KG/M2 | HEIGHT: 65 IN

## 2022-01-25 DIAGNOSIS — E11.9 TYPE 2 DIABETES MELLITUS WITHOUT COMPLICATION, WITHOUT LONG-TERM CURRENT USE OF INSULIN: Primary | ICD-10-CM

## 2022-01-25 DIAGNOSIS — E78.5 HYPERLIPIDEMIA, UNSPECIFIED HYPERLIPIDEMIA TYPE: ICD-10-CM

## 2022-01-25 DIAGNOSIS — M54.12 CERVICAL RADICULOPATHY: ICD-10-CM

## 2022-01-25 DIAGNOSIS — I10 ESSENTIAL HYPERTENSION: ICD-10-CM

## 2022-01-25 DIAGNOSIS — M25.512 ACUTE PAIN OF LEFT SHOULDER: ICD-10-CM

## 2022-01-25 PROCEDURE — 99213 OFFICE O/P EST LOW 20 MIN: CPT | Performed by: STUDENT IN AN ORGANIZED HEALTH CARE EDUCATION/TRAINING PROGRAM

## 2022-01-25 RX ORDER — HYDROCHLOROTHIAZIDE 25 MG/1
25 TABLET ORAL DAILY
Qty: 30 TABLET | Refills: 11 | Status: SHIPPED | OUTPATIENT
Start: 2022-01-25 | End: 2022-09-02 | Stop reason: SDUPTHER

## 2022-01-25 RX ORDER — METOPROLOL TARTRATE 50 MG/1
50 TABLET ORAL 2 TIMES DAILY
Qty: 180 TABLET | Refills: 3 | Status: SHIPPED | OUTPATIENT
Start: 2022-01-25 | End: 2022-09-02 | Stop reason: SDUPTHER

## 2022-01-25 RX ORDER — GABAPENTIN 100 MG/1
300 CAPSULE ORAL 3 TIMES DAILY
Qty: 90 CAPSULE | Refills: 11 | Status: SHIPPED | OUTPATIENT
Start: 2022-01-25 | End: 2022-02-02 | Stop reason: SDUPTHER

## 2022-01-25 RX ORDER — METFORMIN HYDROCHLORIDE 1000 MG/1
1000 TABLET ORAL 2 TIMES DAILY
Qty: 180 TABLET | Refills: 3 | Status: SHIPPED | OUTPATIENT
Start: 2022-01-25 | End: 2022-09-02 | Stop reason: SDUPTHER

## 2022-01-25 RX ORDER — LISINOPRIL 40 MG/1
40 TABLET ORAL DAILY
Qty: 90 TABLET | Refills: 3 | Status: SHIPPED | OUTPATIENT
Start: 2022-01-25 | End: 2022-09-02 | Stop reason: SDUPTHER

## 2022-01-25 RX ORDER — ATORVASTATIN CALCIUM 40 MG/1
40 TABLET, FILM COATED ORAL DAILY
Qty: 90 TABLET | Refills: 3 | Status: SHIPPED | OUTPATIENT
Start: 2022-01-25 | End: 2022-09-02 | Stop reason: SDUPTHER

## 2022-01-25 RX ORDER — GLIMEPIRIDE 2 MG/1
2 TABLET ORAL
Qty: 90 TABLET | Refills: 3 | Status: SHIPPED | OUTPATIENT
Start: 2022-01-25 | End: 2022-09-02

## 2022-01-26 ENCOUNTER — CLINICAL SUPPORT (OUTPATIENT)
Dept: REHABILITATION | Facility: HOSPITAL | Age: 67
End: 2022-01-26
Attending: STUDENT IN AN ORGANIZED HEALTH CARE EDUCATION/TRAINING PROGRAM
Payer: MEDICARE

## 2022-01-26 DIAGNOSIS — R29.898 IMPAIRED STRENGTH OF UPPER EXTREMITY: ICD-10-CM

## 2022-01-26 DIAGNOSIS — M25.511 ACUTE PAIN OF RIGHT SHOULDER: ICD-10-CM

## 2022-01-26 PROCEDURE — 97110 THERAPEUTIC EXERCISES: CPT | Mod: PO

## 2022-01-26 NOTE — PROGRESS NOTES
[facility] OUTPATIENT THERAPY AND WELLNESS  Occupational Therapy Treatment Note    Date: 1/26/2022  Name: Nam William  Clinic Number: [id number]    Therapy Diagnosis:   Encounter Diagnoses   Name Primary?    Acute pain of right shoulder     Impaired strength of upper extremity      Physician: Ulysses West MD    Physician Orders: eval and treat   Medical Diagnosis: M25.512 (ICD-10-CM) - Acute pain of left shoulder  Surgical Procedure and Date: NA  Evaluation Date: 1/14/2022  Insurance Authorization Period Expiration: 12/31/2022  Plan of Care Certification Period: to 3/11/2022     Date of Return to MD: 1/25/2022  Visit # / Visits authorized: 2/20 (3 total)  FOTO: 1/14/2022 /37%     Precautions:  Diabetes     Time In: 8:04  Time Out: 8:54  Total Appointment Time (timed & untimed codes):50 minutes (3 TE)      SUBJECTIVE     Pt reports: She went to her doctor yesterday and has requested to lower her dosage of medication  She was compliant with home exercise program given last session.   Response to previous treatment: None noted at first treatment after Initial Evaluation  Functional change: No adverse reactions noted or reported    Pain: 1/10  Location: left upper extremity     OBJECTIVE     Objective Measures updated at progress report unless specified.    Treatment     Nam received the treatments listed below:     Nam William received the following supervised modalities after being cleared for contradictions for 5 minutes:   - Moist hot pack to her back for 5 minutes prior to therapeutic exercise to decrease pain and increase tissue elasticity    Nam William received therapeutic exercises for 45 minutes including:    Exercise  Repetitions        Neck active flexion/extension  2 x10   Median Nerve Glides  x10   Radial Nerve Glides x10   Ulnar Nerve Glides  x10   Rows  Red   2 sets of 15   Ball roll outs in sitting  x30   Shoulder rolls  2 x10   Chin tucks  x15   Lateral cervical stretch  10 second holds; 5  each side   Side bending  10 each side    Scapular elevation  2 sets of 10   Shoulder extension  Red   2 sets of 15       Patient Education and Home Exercises      Education provided:   - radiculopathy   - Progress towards goals     Written Home Exercises Provided: Patient instructed to cont prior HEP.  Exercises were reviewed and Nam was able to demonstrate them prior to the end of the session.  Nam demonstrated good  understanding of the HEP provided. See EMR under Patient Instructions for exercises provided during therapy sessions.       Assessment     The patient tolerated therapy well on this date. Her pain continues to be located on the medial border of her left scapula and radiates down to her forearm. Nam states that she does not want to take her gabapentin in the morning because it makes her drowsy. She performed all therapeutic exercises without any limitations or complaints of increased pain. Therapeutic exercises were focused on nerve glide and scapular strengthening exercises to decrease her pain that radiates down her arm. Introduced more cervical exercise to decrease radiating pain with good success. Nam is progressing well towards her goals and there are no updates to goals at this time. Pt prognosis is Good.     Pt will continue to benefit from skilled outpatient occupational therapy to address the deficits listed in the problem list on initial evaluation provide pt/family education and to maximize pt's level of independence in the home and community environment.     Pt's spiritual, cultural and educational needs considered and pt agreeable to plan of care and goals.    Anticipated barriers to occupational therapy: diabetes     Goals:   Short Term Goals: (in 4 weeks)  1) Patient will be independent in HEP  2) Decrease pain in back and upper extremity to no more than 6/10 worst in ADL/IADL's  3) Increase AROM in cervical flexion by 5 degrees for improved functioning in ADL/IADL's  4) Increase  left  strength by 5 psi for increased functional use     Long Term Goals: (in 8 weeks)  1) Decrease pain in  back and upper extremity to no more than 2/10 worst in ADL/IADL's  2) Increase AROM of cervical left rotation to WFL for increased functioning in ADL/IADL's  3) Pt will report no issues sleeping through the night due to decrease pain   4) Increased functioning in ADL/IADL's, as evidenced by a FOTO impairment rating of no more than 30%        PLAN   Plan of Care Certification: 1/14/2022 to 3/11/2022.      Outpatient Occupational Therapy 2 times weekly for 8 weeks to include the following interventions: Manual therapy/joint mobilizations, Modalities for pain management, Therapeutic exercises/activities., Strengthening, Joint Protection and Energy Conservation.         Wayne London, OT

## 2022-01-26 NOTE — PROGRESS NOTES
[facility]  History & Physical    SUBJECTIVE:     Chief Complaint:   Chief Complaint   Patient presents with    Arm Pain       History of Present Illness:  66 y.o. female who  has a past medical history of Diabetes mellitus, type 2 and Hypertension. presents to clinic today for f/u of her diabetes. After her last visit she had bloodwork which revealed an A1C of 10.2. Pt had previously only taken Metformin 1000mg qd though she was prescribed BID. We spoke on the phone prior to this visit in regards to her diabetes and she stated she knows insulin is recommended but does not want to have anything to do with sticking herself but would restart BID metformin until coming for visit. At this time pt still does not check sugars at home but reports complaince with BID metformin. She states she has previously been on jardiance but had a bad GI rxn and is not interested in restarting it.She denies any GI sx's from metformin. She still is adamant against insulin therapy at this time, but is open to adding a different oral medication. She denies any other complaints at this time. Her L arm which she was seen for previously is feeling better after initiating PT as well as gabapentin.        Allergies:  Review of patient's allergies indicates:  No Known Allergies    Home Medications:  Current Outpatient Medications on File Prior to Visit   Medication Sig    blood sugar diagnostic Strp To check BG 4 times daily, to use with insurance preferred meter    ibuprofen (ADVIL,MOTRIN) 800 MG tablet Take 1 tablet (800 mg total) by mouth daily as needed.    lancets Misc To check BG 4 times daily, to use with insurance preferred meter    meloxicam (MOBIC) 15 MG tablet Take 15 mg by mouth once daily.    methocarbamoL (ROBAXIN) 500 MG Tab Take 1,000 mg by mouth 3 (three) times daily.    [DISCONTINUED] atorvastatin (LIPITOR) 40 MG tablet Take 1 tablet (40 mg total) by mouth once daily.    [DISCONTINUED] gabapentin (NEURONTIN)  300 MG capsule Take 1 capsule (300 mg total) by mouth 3 (three) times daily.    [DISCONTINUED] gabapentin (NEURONTIN) 300 MG capsule Take 1 capsule (300 mg total) by mouth 3 (three) times daily.    [DISCONTINUED] lisinopriL-hydrochlorothiazide (PRINZIDE,ZESTORETIC) 20-25 mg Tab Take 1 tablet by mouth once daily.    [DISCONTINUED] metFORMIN (GLUCOPHAGE) 1000 MG tablet Take 1 tablet (1,000 mg total) by mouth 2 (two) times daily.    [DISCONTINUED] metoprolol tartrate (LOPRESSOR) 50 MG tablet Take 1 tablet (50 mg total) by mouth 2 (two) times daily.    blood-glucose meter kit To check BG 4 times daily, to use with insurance preferred meter     No current facility-administered medications on file prior to visit.       Past Medical History:   Diagnosis Date    Diabetes mellitus, type 2     Hypertension      No past surgical history on file.  No family history on file.  Social History     Tobacco Use    Smoking status: Never Smoker    Smokeless tobacco: Never Used   Substance Use Topics    Alcohol use: No    Drug use: No        Review of Systems   Constitutional: Negative for chills, fever and malaise/fatigue.   HENT: Negative for congestion, sinus pain and sore throat.    Eyes: Negative for blurred vision, double vision and photophobia.   Respiratory: Negative for cough, sputum production and shortness of breath.    Cardiovascular: Negative for chest pain, palpitations and leg swelling.   Gastrointestinal: Negative for abdominal pain, constipation, diarrhea, nausea and vomiting.   Genitourinary: Negative for dysuria and hematuria.   Musculoskeletal: Negative for myalgias.   Skin: Negative for rash.   Neurological: Negative for sensory change, weakness and headaches.        OBJECTIVE:     Vital Signs:       Physical Exam  Vitals reviewed.   Constitutional:       General: She is not in acute distress.  HENT:      Head: Normocephalic and atraumatic.      Right Ear: External ear normal.      Left Ear: External ear  normal.      Nose: Nose normal.   Eyes:      Conjunctiva/sclera: Conjunctivae normal.      Pupils: Pupils are equal, round, and reactive to light.   Cardiovascular:      Rate and Rhythm: Normal rate and regular rhythm.      Pulses: Normal pulses.      Heart sounds: Normal heart sounds. No murmur heard.  No friction rub. No gallop.    Pulmonary:      Effort: Pulmonary effort is normal.      Breath sounds: Normal breath sounds. No wheezing, rhonchi or rales.   Abdominal:      General: Bowel sounds are normal.      Palpations: There is no mass.      Tenderness: There is no abdominal tenderness.   Musculoskeletal:         General: No swelling or tenderness. Normal range of motion.      Cervical back: Normal range of motion.   Lymphadenopathy:      Cervical: No cervical adenopathy.   Skin:     General: Skin is warm and dry.      Findings: No rash.   Neurological:      General: No focal deficit present.      Mental Status: She is alert.         Laboratory:  Hemoglobin A1C   Date Value Ref Range Status   01/10/2022 10.2 (H) 4.0 - 5.6 % Final     Comment:     ADA Screening Guidelines:  5.7-6.4%  Consistent with prediabetes  >or=6.5%  Consistent with diabetes    High levels of fetal hemoglobin interfere with the HbA1C  assay. Heterozygous hemoglobin variants (HbS, HgC, etc)do  not significantly interfere with this assay.   However, presence of multiple variants may affect accuracy.     03/22/2021 9.8 (H) 4.0 - 5.6 % Final     Comment:     ADA Screening Guidelines:  5.7-6.4%  Consistent with prediabetes  >or=6.5%  Consistent with diabetes    High levels of fetal hemoglobin interfere with the HbA1C  assay. Heterozygous hemoglobin variants (HbS, HgC, etc)do  not significantly interfere with this assay.   However, presence of multiple variants may affect accuracy.     05/18/2020 11.3 (H) 4.0 - 5.6 % Final     Comment:     ADA Screening Guidelines:  5.7-6.4%  Consistent with prediabetes  >or=6.5%  Consistent with diabetes  High  levels of fetal hemoglobin interfere with the HbA1C  assay. Heterozygous hemoglobin variants (HbS, HgC, etc)do  not significantly interfere with this assay.   However, presence of multiple variants may affect accuracy.         A/P:  Nam was seen today for diabetes f/u.    Diagnoses and all orders for this visit:    Type 2 diabetes mellitus without complication, without long-term current use of insulin  -Pt w/ 10.2 A1C only taking metformin 1000mg BID. She is opposed to insulin treatment at this time. We discussed the importance of diabetes management and care as well as the multiple complications which can be related to uncontrolled diabetes. Pt showed understanding and is willing to add glimiperide at this time but does not want any other medication. I suggested she return in 4-8wks so we can recheck her A1C.  -     glimepiride (AMARYL) 2 MG tablet; Take 1 tablet (2 mg total) by mouth daily with breakfast.  -     metFORMIN (GLUCOPHAGE) 1000 MG tablet; Take 1 tablet (1,000 mg total) by mouth 2 (two) times daily.    Essential hypertension  - Pt noted to have elevated BP at this visit and last both ~180/90. Pt endorses compliance with medications as prescribed. We discussed the importance of HTN control especially in relation to her DM. She was agreeable to increasing her lisinopril at this time and at next visit will see if addition medication is indicated.  -     lisinopriL (PRINIVIL,ZESTRIL) 40 MG tablet; Take 1 tablet (40 mg total) by mouth once daily.  -     hydroCHLOROthiazide (HYDRODIURIL) 25 MG tablet; Take 1 tablet (25 mg total) by mouth once daily.  -     metoprolol tartrate (LOPRESSOR) 50 MG tablet; Take 1 tablet (50 mg total) by mouth 2 (two) times daily.    Cervical radiculopathy  -     gabapentin (NEURONTIN) 100 MG capsule; Take 3 capsules (300 mg total) by mouth 3 (three) times daily.  - Pt endorses improvement in sx's and benefit from PT. Encouraged her to continue with therapy and home stretches.      Hyperlipidemia, unspecified hyperlipidemia type  -     atorvastatin (LIPITOR) 40 MG tablet; Take 1 tablet (40 mg total) by mouth once daily.      F/u in 6wks    Ulysses West MD  [facility], PGY-1  01/25/2022

## 2022-01-27 ENCOUNTER — PATIENT MESSAGE (OUTPATIENT)
Dept: FAMILY MEDICINE | Facility: HOSPITAL | Age: 67
End: 2022-01-27
Payer: MEDICARE

## 2022-01-28 ENCOUNTER — CLINICAL SUPPORT (OUTPATIENT)
Dept: REHABILITATION | Facility: HOSPITAL | Age: 67
End: 2022-01-28
Attending: STUDENT IN AN ORGANIZED HEALTH CARE EDUCATION/TRAINING PROGRAM
Payer: MEDICARE

## 2022-01-28 DIAGNOSIS — M25.511 ACUTE PAIN OF RIGHT SHOULDER: ICD-10-CM

## 2022-01-28 DIAGNOSIS — R29.898 IMPAIRED STRENGTH OF UPPER EXTREMITY: ICD-10-CM

## 2022-01-28 PROCEDURE — 97110 THERAPEUTIC EXERCISES: CPT | Mod: PO

## 2022-01-28 NOTE — PROGRESS NOTES
[facility]  Occupational Therapy Treatment Note    Date: 1/28/2022  Name: Nam William  Clinic Number: [id number]    Therapy Diagnosis:   Encounter Diagnoses   Name Primary?    Acute pain of right shoulder     Impaired strength of upper extremity      Physician: Ulysses West MD    Physician Orders: eval and treat   Medical Diagnosis: M25.512 (ICD-10-CM) - Acute pain of left shoulder  Surgical Procedure and Date: NA  Evaluation Date: 1/14/2022  Insurance Authorization Period Expiration: 12/31/2022  Plan of Care Certification Period: to 3/11/2022     Date of Return to MD: 1/25/2022  Visit # / Visits authorized: 3/20 (4 total)  FOTO: 1/14/2022 /37%     Precautions:  Diabetes     Time In: 8:00  Time Out: 8:52  Total Appointment Time (timed & untimed codes):52 minutes (3 TE)      SUBJECTIVE     Pt reports: no new complaints   She was compliant with home exercise program given last session.   Response to previous treatment: None noted at first treatment after Initial Evaluation  Functional change: No adverse reactions noted or reported    Pain: 1/10  Location: left upper extremity     OBJECTIVE     Objective Measures updated at progress report unless specified.    Treatment     Nam received the treatments listed below:     Nam William received therapeutic exercises for 45 minutes including:    Exercise  Repetitions        Neck active flexion/extension  2 x10   Median Nerve Glides  x10   Radial Nerve Glides x10   Ulnar Nerve Glides  x10   Rows  Red   2 sets of 15   Ball roll outs in sitting  x30   Shoulder rolls  2 x10   Chin tucks  x15   Lateral cervical stretch  10 second holds; 5 each side   Side bending  10 each side    Scapular elevation  2 sets of 10   Shoulder extension  Red   2 sets of 15     aNm William received the following supervised modalities after being cleared for contradictions for 7 minutes:   -Moist hot pack to her pack for 7 minutes to decrease pain following  therapeutic exercises       Patient Education and Home Exercises      Education provided:   - radiculopathy   - Progress towards goals     Written Home Exercises Provided: Patient instructed to cont prior HEP.  Exercises were reviewed and Nam was able to demonstrate them prior to the end of the session.  Nam demonstrated good  understanding of the HEP provided. See EMR under Patient Instructions for exercises provided during therapy sessions.       Assessment     The patient tolerated therapy well on this date. She arrives to the clinic today with continued 1/10 pain.  Her pain continues to be located on the medial border of her left scapula and radiates down to her forearm. Nam states she has  taken her gabapentin yet this morning.Therapeutic exercises were focused on nerve glide and scapular strengthening exercises to decrease her pain that radiates down her arm. Nam stated that she had pain that radiated down her arm during a cervical flexion activity. Her pain increased from a 1/10 to a 5/10.  She was able to complete all other therapeutic exercises with no complaints. A moist hot pack was applied after exercises to decrease pain.  Nam is progressing well towards her goals and there are no updates to goals at this time. Pt prognosis is Good.     Pt will continue to benefit from skilled outpatient occupational therapy to address the deficits listed in the problem list on initial evaluation provide pt/family education and to maximize pt's level of independence in the home and community environment.     Pt's spiritual, cultural and educational needs considered and pt agreeable to plan of care and goals.    Anticipated barriers to occupational therapy: diabetes     Goals:   Short Term Goals: (in 4 weeks)  1) Patient will be independent in HEP  2) Decrease pain in back and upper extremity to no more than 6/10 worst in ADL/IADL's  3) Increase AROM in cervical flexion by 5 degrees for improved functioning in  ADL/IADL's  4) Increase left  strength by 5 psi for increased functional use     Long Term Goals: (in 8 weeks)  1) Decrease pain in  back and upper extremity to no more than 2/10 worst in ADL/IADL's  2) Increase AROM of cervical left rotation to WFL for increased functioning in ADL/IADL's  3) Pt will report no issues sleeping through the night due to decrease pain   4) Increased functioning in ADL/IADL's, as evidenced by a FOTO impairment rating of no more than 30%        PLAN   Plan of Care Certification: 1/14/2022 to 3/11/2022.      Outpatient Occupational Therapy 2 times weekly for 8 weeks to include the following interventions: Manual therapy/joint mobilizations, Modalities for pain management, Therapeutic exercises/activities., Strengthening, Joint Protection and Energy Conservation.         Wayne London, OT

## 2022-02-01 NOTE — PROGRESS NOTES
I assume primary medical responsibility for this patient. I have reviewed the case history, findings, diagnosis and treatment plan with the resident and agree that the care is reasonable and necessary. This service has been performed by a resident without the presence of a teaching physician under the primary care exception.  See below addendum for my evaluation and additional findings.   Refill request is on Dr Sullivan desk awaiting a signature.

## 2022-02-02 ENCOUNTER — TELEPHONE (OUTPATIENT)
Dept: FAMILY MEDICINE | Facility: HOSPITAL | Age: 67
End: 2022-02-02
Payer: MEDICARE

## 2022-02-02 ENCOUNTER — CLINICAL SUPPORT (OUTPATIENT)
Dept: REHABILITATION | Facility: HOSPITAL | Age: 67
End: 2022-02-02
Attending: STUDENT IN AN ORGANIZED HEALTH CARE EDUCATION/TRAINING PROGRAM
Payer: MEDICARE

## 2022-02-02 DIAGNOSIS — M25.511 ACUTE PAIN OF RIGHT SHOULDER: ICD-10-CM

## 2022-02-02 DIAGNOSIS — R29.898 IMPAIRED STRENGTH OF UPPER EXTREMITY: ICD-10-CM

## 2022-02-02 DIAGNOSIS — M54.12 CERVICAL RADICULOPATHY: ICD-10-CM

## 2022-02-02 PROCEDURE — 97110 THERAPEUTIC EXERCISES: CPT | Mod: PO

## 2022-02-02 RX ORDER — GABAPENTIN 100 MG/1
300 CAPSULE ORAL 3 TIMES DAILY
Qty: 90 CAPSULE | Refills: 0 | Status: SHIPPED | OUTPATIENT
Start: 2022-02-02 | End: 2022-09-02 | Stop reason: SDUPTHER

## 2022-02-02 NOTE — PROGRESS NOTES
"    [facility]  Occupational Therapy Treatment Note    Date: 2/2/2022  Name: Nam William  Clinic Number: [id number]    Therapy Diagnosis:   Encounter Diagnoses   Name Primary?    Acute pain of right shoulder     Impaired strength of upper extremity      Physician: Ulysses West MD    Physician Orders: eval and treat   Medical Diagnosis: M25.512 (ICD-10-CM) - Acute pain of left shoulder  Surgical Procedure and Date: NA  Evaluation Date: 1/14/2022  Insurance Authorization Period Expiration: 12/31/2022  Plan of Care Certification Period: to 3/11/2022     Date of Return to MD: 1/25/2022  Visit # / Visits authorized: 4/20 (5 total)  FOTO: 1/14/2022 /37%     Precautions:  Diabetes     Time In: 7:58  Time Out: 8:47  Total Appointment Time (timed & untimed codes): 49 minutes (3 TE)      SUBJECTIVE     Pt reports: "When I am relaxing I really dont have pain"  She was compliant with home exercise program given last session.   Response to previous treatment: None noted   Functional change:Decreased pain  Pain: 1/10  Location: left upper extremity     OBJECTIVE     Objective Measures updated at progress report unless specified.    Treatment     Nam received the treatments listed below:     Nam William received therapeutic exercises for 42 minutes including:    Exercise  Repetitions        Neck active flexion/extension  2 x10   Median Nerve Glides  x10   Radial Nerve Glides x10   Ulnar Nerve Glides  x10   Rows  Red   2 sets of 15   Ball roll outs in sitting  x30   Shoulder rolls  2 x10   Chin tucks  x15   Lateral cervical stretch  10 second holds; 5 each side   Side bending  10 each side    Scapular elevation  2 sets of 10   Shoulder extension  Red   2 sets of 15   External rotation Yellow  2 sets of 15     Nam William received the following supervised modalities after being cleared for contradictions for 5 minutes:   -Moist hot pack to her back for 5 minutes to decrease pain following " therapeutic exercises       Patient Education and Home Exercises      Education provided:   - radiculopathy   - Progress towards goals     Written Home Exercises Provided: Patient instructed to cont prior HEP.  Exercises were reviewed and Nam was able to demonstrate them prior to the end of the session.  Nam demonstrated good  understanding of the HEP provided. See EMR under Patient Instructions for exercises provided during therapy sessions.       Assessment     The patient tolerated therapy well on this date. She arrives to the clinic today with continued 1/10 pain.  Her pain continues to be located on the medial border of her left scapula and radiates down to her forearm. Nam states she has not taken her gabapentin yet this morning; she reports that she is having issues getting the right amount of her prescription from her doctor. Therapeutic exercises were focused on nerve glide and scapular strengthening exercises to decrease her pain that radiates down her arm. Nam continued to have pain that radiated down her arm during a cervical flexion activity. She was able to complete all other therapeutic exercises with no complaints. Introduced a external rotation activity with good success. A moist hot pack was applied after exercises to decrease pain.  Nam is progressing well towards her goals and there are no updates to goals at this time. Pt prognosis is Good.     Pt will continue to benefit from skilled outpatient occupational therapy to address the deficits listed in the problem list on initial evaluation provide pt/family education and to maximize pt's level of independence in the home and community environment.     Pt's spiritual, cultural and educational needs considered and pt agreeable to plan of care and goals.    Anticipated barriers to occupational therapy: diabetes     Goals:   Short Term Goals: (in 4 weeks)  1) Patient will be independent in HEP  2) Decrease pain in back and upper extremity to  no more than 6/10 worst in ADL/IADL's  3) Increase AROM in cervical flexion by 5 degrees for improved functioning in ADL/IADL's  4) Increase left  strength by 5 psi for increased functional use     Long Term Goals: (in 8 weeks)  1) Decrease pain in  back and upper extremity to no more than 2/10 worst in ADL/IADL's  2) Increase AROM of cervical left rotation to WFL for increased functioning in ADL/IADL's  3) Pt will report no issues sleeping through the night due to decrease pain   4) Increased functioning in ADL/IADL's, as evidenced by a FOTO impairment rating of no more than 30%        PLAN   Plan of Care Certification: 1/14/2022 to 3/11/2022.      Outpatient Occupational Therapy 2 times weekly for 8 weeks to include the following interventions: Manual therapy/joint mobilizations, Modalities for pain management, Therapeutic exercises/activities., Strengthening, Joint Protection and Energy Conservation.         Wayne London, OT

## 2022-02-02 NOTE — TELEPHONE ENCOUNTER
Called patient after she asked to speak to the supervisor. She explained that Dr. West sent prescription for 100mg 3 capsules 3 times a day and sent 90 tablets. Only last 10 days.Patient either needs more capsules #270./month or another prescription for 300mg tablets. One tablet 3 times a day.#90 and she can break it in half in the morning.She can't break the capsules in half so ask to send Tablets.

## 2022-02-03 NOTE — TELEPHONE ENCOUNTER
"I called patient to advise that the 300mg. Capsule was sent to pharmacy last night. If you wants the tablets instead so she can break it to have pharmacy call us.Patient expresses that she is "ok the way it is"  "

## 2022-02-04 ENCOUNTER — CLINICAL SUPPORT (OUTPATIENT)
Dept: REHABILITATION | Facility: HOSPITAL | Age: 67
End: 2022-02-04
Attending: STUDENT IN AN ORGANIZED HEALTH CARE EDUCATION/TRAINING PROGRAM
Payer: MEDICARE

## 2022-02-04 DIAGNOSIS — M25.511 ACUTE PAIN OF RIGHT SHOULDER: ICD-10-CM

## 2022-02-04 DIAGNOSIS — R29.898 IMPAIRED STRENGTH OF UPPER EXTREMITY: ICD-10-CM

## 2022-02-04 PROCEDURE — 97110 THERAPEUTIC EXERCISES: CPT | Mod: PO

## 2022-02-04 NOTE — PROGRESS NOTES
" [facility]  Occupational Therapy Treatment Note    Date: 2/4/2022  Name: Nam William  Clinic Number: [id number]    Therapy Diagnosis:   Encounter Diagnoses   Name Primary?    Acute pain of right shoulder     Impaired strength of upper extremity      Physician: Ulysses West MD    Physician Orders: eval and treat   Medical Diagnosis: M25.512 (ICD-10-CM) - Acute pain of left shoulder  Surgical Procedure and Date: NA  Evaluation Date: 1/14/2022  Insurance Authorization Period Expiration: 12/31/2022  Plan of Care Certification Period: to 3/11/2022     Date of Return to MD: 1/25/2022  Visit # / Visits authorized: 4/20 (5 total)  FOTO: 1/14/2022 /37%     Precautions:  Diabetes     Time In: 8:00  Time Out: 8:44  Total Appointment Time (timed & untimed codes): 44 minutes (3 TE)      SUBJECTIVE     Pt reports: feeling better " When I take the medicine I don't have any pain"   She was compliant with home exercise program given last session.   Response to previous treatment: None noted   Functional change:Decreased pain  Pain: 1/10  Location: left upper extremity     OBJECTIVE     Objective Measures updated at progress report unless specified.    Treatment     Nam received the treatments listed below:     Nam William received therapeutic exercises for 44 minutes including:    Exercise  Repetitions        Neck active flexion/extension  2 x10   Median Nerve Glides  x10   Radial Nerve Glides x10   Ulnar Nerve Glides  x10   Rows  Red   2 sets of 15   Ball roll outs in sitting  x30   Shoulder rolls  2 x10   Chin tucks  x15   Lateral cervical stretch  10 second holds; 5 each side   Side bending  10 each side    Scapular elevation  2 sets of 10   Shoulder extension  Red   2 sets of 15   External rotation Yellow  2 sets of 15       Patient Education and Home Exercises      Education provided:   - radiculopathy   - Progress towards goals     Written Home Exercises Provided: Patient instructed to " cont prior HEP.  Exercises were reviewed and Nam was able to demonstrate them prior to the end of the session.  Nam demonstrated good  understanding of the HEP provided. See EMR under Patient Instructions for exercises provided during therapy sessions.       Assessment     The patient tolerated therapy well on this date. She arrives to the clinic today with continued 1/10 pain.  Her pain continues to be located on the medial border of her left scapula and radiates down to her forearm. Nam stated she does not have pain while she is at home and when she takes her medicTherapeutic exercises were focused on nerve glide and scapular strengthening exercises to decrease her pain that radiates down her arm. Nam continued to have pain that radiated down her arm during a cervical flexion activity. He pain increased to a 3/10 with exercise, but it improved with rest.  She was able to complete all other therapeutic exercises with no complaints.    Nam states that she has no pain when she takes her medicine. She also stated that while she is home, she has no pain at rest; she feels the pain the most while she exercises. Spoke with Nam about her upcoming visits and holding her next visit to observe her symptoms. She agreed and stated that she will monitor to see if she marsha in changes in pain level with rest and activity at home.    .Nam is progressing well towards her goals and there are no updates to goals at this time. Pt prognosis is Good.     Pt will continue to benefit from skilled outpatient occupational therapy to address the deficits listed in the problem list on initial evaluation provide pt/family education and to maximize pt's level of independence in the home and community environment.     Pt's spiritual, cultural and educational needs considered and pt agreeable to plan of care and goals.    Anticipated barriers to occupational therapy: diabetes     Goals:   Short Term Goals: (in 4 weeks)  1) Patient  will be independent in HEP  2) Decrease pain in back and upper extremity to no more than 6/10 worst in ADL/IADL's  3) Increase AROM in cervical flexion by 5 degrees for improved functioning in ADL/IADL's  4) Increase left  strength by 5 psi for increased functional use     Long Term Goals: (in 8 weeks)  1) Decrease pain in  back and upper extremity to no more than 2/10 worst in ADL/IADL's  2) Increase AROM of cervical left rotation to WFL for increased functioning in ADL/IADL's  3) Pt will report no issues sleeping through the night due to decrease pain   4) Increased functioning in ADL/IADL's, as evidenced by a FOTO impairment rating of no more than 30%        PLAN   Plan of Care Certification: 1/14/2022 to 3/11/2022.      Outpatient Occupational Therapy 2 times weekly for 8 weeks to include the following interventions: Manual therapy/joint mobilizations, Modalities for pain management, Therapeutic exercises/activities., Strengthening, Joint Protection and Energy Conservation.         Wayne London, OT

## 2022-09-02 ENCOUNTER — OFFICE VISIT (OUTPATIENT)
Dept: FAMILY MEDICINE | Facility: HOSPITAL | Age: 67
End: 2022-09-02
Payer: COMMERCIAL

## 2022-09-02 VITALS — HEIGHT: 65 IN | WEIGHT: 225.75 LBS | BODY MASS INDEX: 37.61 KG/M2

## 2022-09-02 DIAGNOSIS — E78.5 HYPERLIPIDEMIA, UNSPECIFIED HYPERLIPIDEMIA TYPE: ICD-10-CM

## 2022-09-02 DIAGNOSIS — M54.50 CHRONIC MIDLINE LOW BACK PAIN WITHOUT SCIATICA: ICD-10-CM

## 2022-09-02 DIAGNOSIS — E11.9 TYPE 2 DIABETES MELLITUS WITHOUT COMPLICATION, WITHOUT LONG-TERM CURRENT USE OF INSULIN: ICD-10-CM

## 2022-09-02 DIAGNOSIS — M54.12 CERVICAL RADICULOPATHY: ICD-10-CM

## 2022-09-02 DIAGNOSIS — G89.29 CHRONIC MIDLINE LOW BACK PAIN WITHOUT SCIATICA: ICD-10-CM

## 2022-09-02 DIAGNOSIS — I10 ESSENTIAL HYPERTENSION: ICD-10-CM

## 2022-09-02 PROCEDURE — 99213 OFFICE O/P EST LOW 20 MIN: CPT | Performed by: STUDENT IN AN ORGANIZED HEALTH CARE EDUCATION/TRAINING PROGRAM

## 2022-09-02 RX ORDER — METFORMIN HYDROCHLORIDE 1000 MG/1
1000 TABLET ORAL 2 TIMES DAILY
Qty: 180 TABLET | Refills: 3 | Status: SHIPPED | OUTPATIENT
Start: 2022-09-02 | End: 2023-08-10 | Stop reason: SDUPTHER

## 2022-09-02 RX ORDER — LISINOPRIL 40 MG/1
40 TABLET ORAL DAILY
Qty: 90 TABLET | Refills: 3 | Status: SHIPPED | OUTPATIENT
Start: 2022-09-02 | End: 2023-08-10

## 2022-09-02 RX ORDER — ATORVASTATIN CALCIUM 40 MG/1
40 TABLET, FILM COATED ORAL DAILY
Qty: 90 TABLET | Refills: 3 | Status: SHIPPED | OUTPATIENT
Start: 2022-09-02 | End: 2023-08-10 | Stop reason: SDUPTHER

## 2022-09-02 RX ORDER — GABAPENTIN 100 MG/1
300 CAPSULE ORAL 3 TIMES DAILY
Qty: 90 CAPSULE | Refills: 0 | Status: SHIPPED | OUTPATIENT
Start: 2022-09-02 | End: 2023-09-20 | Stop reason: ALTCHOICE

## 2022-09-02 RX ORDER — METOPROLOL TARTRATE 50 MG/1
50 TABLET ORAL 2 TIMES DAILY
Qty: 180 TABLET | Refills: 3 | Status: SHIPPED | OUTPATIENT
Start: 2022-09-02 | End: 2023-08-10 | Stop reason: SDUPTHER

## 2022-09-02 RX ORDER — IBUPROFEN 800 MG/1
800 TABLET ORAL DAILY PRN
Qty: 90 TABLET | Refills: 3 | Status: SHIPPED | OUTPATIENT
Start: 2022-09-02 | End: 2023-08-10 | Stop reason: SDUPTHER

## 2022-09-02 RX ORDER — HYDROCHLOROTHIAZIDE 25 MG/1
25 TABLET ORAL DAILY
Qty: 90 TABLET | Refills: 3 | Status: SHIPPED | OUTPATIENT
Start: 2022-09-02 | End: 2023-08-10

## 2022-09-02 NOTE — PROGRESS NOTES
[facility]  History & Physical    SUBJECTIVE:     Chief Complaint:   Chief Complaint   Patient presents with    Diabetes       History of Present Illness:  66 y.o. female who  has a past medical history of Diabetes mellitus, type 2 and Hypertension. presents to clinic today for refills. She has no complaints or issues today and would like refills of her medications at this time. She denies any F/C/N/V/D/C.       Allergies:  Review of patient's allergies indicates:  No Known Allergies    Home Medications:  Current Outpatient Medications on File Prior to Visit   Medication Sig    [DISCONTINUED] atorvastatin (LIPITOR) 40 MG tablet Take 1 tablet (40 mg total) by mouth once daily.    [DISCONTINUED] gabapentin (NEURONTIN) 100 MG capsule Take 3 capsules (300 mg total) by mouth 3 (three) times daily.    [DISCONTINUED] hydroCHLOROthiazide (HYDRODIURIL) 25 MG tablet Take 1 tablet (25 mg total) by mouth once daily.    [DISCONTINUED] ibuprofen (ADVIL,MOTRIN) 800 MG tablet Take 1 tablet (800 mg total) by mouth daily as needed.    [DISCONTINUED] lisinopriL (PRINIVIL,ZESTRIL) 40 MG tablet Take 1 tablet (40 mg total) by mouth once daily.    [DISCONTINUED] metFORMIN (GLUCOPHAGE) 1000 MG tablet Take 1 tablet (1,000 mg total) by mouth 2 (two) times daily.    blood-glucose meter kit To check BG 4 times daily, to use with insurance preferred meter    [DISCONTINUED] blood sugar diagnostic Strp To check BG 4 times daily, to use with insurance preferred meter    [DISCONTINUED] glimepiride (AMARYL) 2 MG tablet Take 1 tablet (2 mg total) by mouth daily with breakfast.    [DISCONTINUED] lancets Misc To check BG 4 times daily, to use with insurance preferred meter    [DISCONTINUED] meloxicam (MOBIC) 15 MG tablet Take 15 mg by mouth once daily.    [DISCONTINUED] methocarbamoL (ROBAXIN) 500 MG Tab Take 1,000 mg by mouth 3 (three) times daily.    [DISCONTINUED] metoprolol tartrate (LOPRESSOR) 50 MG tablet Take 1 tablet (50 mg total) by  mouth 2 (two) times daily.     No current facility-administered medications on file prior to visit.       Past Medical History:   Diagnosis Date    Diabetes mellitus, type 2     Hypertension      No past surgical history on file.  No family history on file.  Social History     Tobacco Use    Smoking status: Never    Smokeless tobacco: Never   Substance Use Topics    Alcohol use: No    Drug use: No        Review of Systems   Constitutional:  Negative for chills, fever and malaise/fatigue.   HENT:  Negative for congestion, sinus pain and sore throat.    Eyes:  Negative for photophobia.   Respiratory:  Negative for cough, sputum production and shortness of breath.    Cardiovascular:  Negative for chest pain, palpitations and leg swelling.   Gastrointestinal:  Negative for abdominal pain, constipation, diarrhea, nausea and vomiting.   Genitourinary:  Negative for dysuria and hematuria.   Musculoskeletal:  Negative for myalgias.   Skin:  Negative for rash.   Neurological:  Negative for sensory change, weakness and headaches.      OBJECTIVE:     Vital Signs:       Physical Exam  Vitals reviewed.   Constitutional:       General: She is not in acute distress.  HENT:      Head: Normocephalic and atraumatic.      Right Ear: External ear normal.      Left Ear: External ear normal.      Nose: Nose normal.   Eyes:      Conjunctiva/sclera: Conjunctivae normal.      Pupils: Pupils are equal, round, and reactive to light.   Cardiovascular:      Rate and Rhythm: Normal rate and regular rhythm.      Pulses: Normal pulses.      Heart sounds: Normal heart sounds. No murmur heard.    No friction rub. No gallop.   Pulmonary:      Effort: Pulmonary effort is normal.      Breath sounds: Normal breath sounds. No wheezing, rhonchi or rales.   Abdominal:      General: Bowel sounds are normal.      Palpations: There is no mass.      Tenderness: There is no abdominal tenderness.   Musculoskeletal:         General: No swelling or tenderness.  Normal range of motion.      Cervical back: Normal range of motion.   Lymphadenopathy:      Cervical: No cervical adenopathy.   Skin:     General: Skin is warm and dry.      Findings: No rash.   Neurological:      General: No focal deficit present.      Mental Status: She is alert.       Laboratory:  Hemoglobin A1C   Date Value Ref Range Status   01/10/2022 10.2 (H) 4.0 - 5.6 % Final     Comment:     ADA Screening Guidelines:  5.7-6.4%  Consistent with prediabetes  >or=6.5%  Consistent with diabetes    High levels of fetal hemoglobin interfere with the HbA1C  assay. Heterozygous hemoglobin variants (HbS, HgC, etc)do  not significantly interfere with this assay.   However, presence of multiple variants may affect accuracy.     03/22/2021 9.8 (H) 4.0 - 5.6 % Final     Comment:     ADA Screening Guidelines:  5.7-6.4%  Consistent with prediabetes  >or=6.5%  Consistent with diabetes    High levels of fetal hemoglobin interfere with the HbA1C  assay. Heterozygous hemoglobin variants (HbS, HgC, etc)do  not significantly interfere with this assay.   However, presence of multiple variants may affect accuracy.     05/18/2020 11.3 (H) 4.0 - 5.6 % Final     Comment:     ADA Screening Guidelines:  5.7-6.4%  Consistent with prediabetes  >or=6.5%  Consistent with diabetes  High levels of fetal hemoglobin interfere with the HbA1C  assay. Heterozygous hemoglobin variants (HbS, HgC, etc)do  not significantly interfere with this assay.   However, presence of multiple variants may affect accuracy.         A/P:  Nam was seen today for diabetes and refills. Pt again discussed importance of medication compliance and further tx of her diabetes with insulin or other medications to which she refused. Pt also recently lost a sister to breast cancer and again when discussing screening she denied wanting to participate with either a colonoscopy or mammogram at this time.    Diagnoses and all orders for this visit:    Hyperlipidemia,  unspecified hyperlipidemia type  -     atorvastatin (LIPITOR) 40 MG tablet; Take 1 tablet (40 mg total) by mouth once daily.  -     CBC Auto Differential; Future  -     Comprehensive Metabolic Panel; Future  -     Lipid Panel; Future    Type 2 diabetes mellitus without complication, without long-term current use of insulin  -     blood sugar diagnostic Strp; To check BG 4 times daily, to use with insurance preferred meter  -     metFORMIN (GLUCOPHAGE) 1000 MG tablet; Take 1 tablet (1,000 mg total) by mouth 2 (two) times daily.  -     CBC Auto Differential; Future  -     Comprehensive Metabolic Panel; Future  -     Hemoglobin A1C; Future    Cervical radiculopathy  -     gabapentin (NEURONTIN) 100 MG capsule; Take 3 capsules (300 mg total) by mouth 3 (three) times daily.    Essential hypertension  -     hydroCHLOROthiazide (HYDRODIURIL) 25 MG tablet; Take 1 tablet (25 mg total) by mouth once daily.  -     lisinopriL (PRINIVIL,ZESTRIL) 40 MG tablet; Take 1 tablet (40 mg total) by mouth once daily.  -     metoprolol tartrate (LOPRESSOR) 50 MG tablet; Take 1 tablet (50 mg total) by mouth 2 (two) times daily.    Chronic midline low back pain without sciatica  -     ibuprofen (ADVIL,MOTRIN) 800 MG tablet; Take 1 tablet (800 mg total) by mouth daily as needed.        Ulysses West MD  [facility], PGY-2  09/02/2022

## 2022-09-30 NOTE — PROGRESS NOTES
I assume primary medical responsibility for this patient. I have reviewed the history, physical, and assessement & treatment plan with the resident and agree that the care is reasonable and necessary. This service has been performed by a resident without the presence of a teaching physician under the primary care exception. If necessary, an addendum of additional findings or evaluation beyond the resident documentation will be noted below.     Elizabeth Franklin MD

## 2023-08-10 ENCOUNTER — OFFICE VISIT (OUTPATIENT)
Dept: FAMILY MEDICINE | Facility: HOSPITAL | Age: 68
End: 2023-08-10
Payer: OTHER GOVERNMENT

## 2023-08-10 VITALS
DIASTOLIC BLOOD PRESSURE: 98 MMHG | HEIGHT: 65 IN | BODY MASS INDEX: 38.24 KG/M2 | WEIGHT: 229.5 LBS | SYSTOLIC BLOOD PRESSURE: 192 MMHG | HEART RATE: 79 BPM

## 2023-08-10 DIAGNOSIS — I10 ESSENTIAL HYPERTENSION: Primary | ICD-10-CM

## 2023-08-10 DIAGNOSIS — R93.7 ABNORMAL BONE DENSITY SCREENING: ICD-10-CM

## 2023-08-10 DIAGNOSIS — Z13.820 SCREENING FOR OSTEOPOROSIS: ICD-10-CM

## 2023-08-10 DIAGNOSIS — G89.29 CHRONIC MIDLINE LOW BACK PAIN WITHOUT SCIATICA: ICD-10-CM

## 2023-08-10 DIAGNOSIS — E78.5 HYPERLIPIDEMIA, UNSPECIFIED HYPERLIPIDEMIA TYPE: ICD-10-CM

## 2023-08-10 DIAGNOSIS — M54.50 CHRONIC MIDLINE LOW BACK PAIN WITHOUT SCIATICA: ICD-10-CM

## 2023-08-10 DIAGNOSIS — E11.9 TYPE 2 DIABETES MELLITUS WITHOUT COMPLICATION, WITHOUT LONG-TERM CURRENT USE OF INSULIN: ICD-10-CM

## 2023-08-10 PROCEDURE — 99214 OFFICE O/P EST MOD 30 MIN: CPT | Performed by: STUDENT IN AN ORGANIZED HEALTH CARE EDUCATION/TRAINING PROGRAM

## 2023-08-10 RX ORDER — LISINOPRIL 40 MG/1
40 TABLET ORAL DAILY
Qty: 90 TABLET | Refills: 3 | Status: SHIPPED | OUTPATIENT
Start: 2023-08-10 | End: 2024-08-09

## 2023-08-10 RX ORDER — METOPROLOL TARTRATE 50 MG/1
50 TABLET ORAL 2 TIMES DAILY
Qty: 180 TABLET | Refills: 3 | Status: SHIPPED | OUTPATIENT
Start: 2023-08-10 | End: 2024-08-09

## 2023-08-10 RX ORDER — NEBULIZER AND COMPRESSOR
1 EACH MISCELLANEOUS DAILY
Refills: 0 | COMMUNITY
Start: 2023-08-10

## 2023-08-10 RX ORDER — METFORMIN HYDROCHLORIDE 1000 MG/1
1000 TABLET ORAL 2 TIMES DAILY
Qty: 180 TABLET | Refills: 3 | Status: SHIPPED | OUTPATIENT
Start: 2023-08-10 | End: 2024-08-09

## 2023-08-10 RX ORDER — INSULIN PUMP SYRINGE, 3 ML
EACH MISCELLANEOUS
Qty: 1 EACH | Refills: 0 | Status: SHIPPED | OUTPATIENT
Start: 2023-08-10 | End: 2024-08-09

## 2023-08-10 RX ORDER — ATORVASTATIN CALCIUM 40 MG/1
40 TABLET, FILM COATED ORAL DAILY
Qty: 90 TABLET | Refills: 3 | Status: SHIPPED | OUTPATIENT
Start: 2023-08-10

## 2023-08-10 RX ORDER — IBUPROFEN 800 MG/1
800 TABLET ORAL DAILY PRN
Qty: 90 TABLET | Refills: 3 | Status: SHIPPED | OUTPATIENT
Start: 2023-08-10

## 2023-08-10 RX ORDER — HYDROCHLOROTHIAZIDE 25 MG/1
25 TABLET ORAL DAILY
Qty: 90 TABLET | Refills: 3 | Status: SHIPPED | OUTPATIENT
Start: 2023-08-10 | End: 2024-08-09

## 2023-08-10 NOTE — PROGRESS NOTES
"[facility]  History & Physical    SUBJECTIVE:     Chief Complaint:   Chief Complaint   Patient presents with    Annual Exam       History of Present Illness:  67 y.o. female who  has a past medical history of Diabetes mellitus, type 2 and Hypertension. presents to clinic today for annual exam.     HTN: Chronically elevated at previous apts. Repeated today and 180/90. Pt endorses compliance of medications. She denies any sx's. She denies taking her pressure at home, but says "its always normal."    Diabetes: Pt only taking metformin. Denies any s/e. A1c improved slightly from previous. She denies checking her sugar at home.       Allergies:  Review of patient's allergies indicates:  No Known Allergies    Home Medications:  Current Outpatient Medications on File Prior to Visit   Medication Sig    gabapentin (NEURONTIN) 100 MG capsule Take 3 capsules (300 mg total) by mouth 3 (three) times daily.    [DISCONTINUED] atorvastatin (LIPITOR) 40 MG tablet Take 1 tablet (40 mg total) by mouth once daily.    [DISCONTINUED] blood sugar diagnostic Strp To check BG 4 times daily, to use with insurance preferred meter    [DISCONTINUED] hydroCHLOROthiazide (HYDRODIURIL) 25 MG tablet Take 1 tablet (25 mg total) by mouth once daily.    [DISCONTINUED] ibuprofen (ADVIL,MOTRIN) 800 MG tablet Take 1 tablet (800 mg total) by mouth daily as needed.    [DISCONTINUED] lisinopriL (PRINIVIL,ZESTRIL) 40 MG tablet Take 1 tablet (40 mg total) by mouth once daily.    [DISCONTINUED] metFORMIN (GLUCOPHAGE) 1000 MG tablet Take 1 tablet (1,000 mg total) by mouth 2 (two) times daily.    [DISCONTINUED] metoprolol tartrate (LOPRESSOR) 50 MG tablet Take 1 tablet (50 mg total) by mouth 2 (two) times daily.    [DISCONTINUED] blood-glucose meter kit To check BG 4 times daily, to use with insurance preferred meter     No current facility-administered medications on file prior to visit.       Past Medical History:   Diagnosis Date    Diabetes " mellitus, type 2     Hypertension      No past surgical history on file.  No family history on file.  Social History     Tobacco Use    Smoking status: Never    Smokeless tobacco: Never   Substance Use Topics    Alcohol use: No    Drug use: No        Review of Systems   Constitutional:  Negative for chills, fever and malaise/fatigue.   HENT:  Negative for congestion, sinus pain and sore throat.    Eyes:  Negative for photophobia.   Respiratory:  Negative for cough, sputum production and shortness of breath.    Cardiovascular:  Negative for chest pain, palpitations and leg swelling.   Gastrointestinal:  Negative for abdominal pain, constipation, diarrhea, nausea and vomiting.   Genitourinary:  Negative for dysuria and hematuria.   Musculoskeletal:  Negative for myalgias.   Skin:  Negative for rash.   Neurological:  Negative for sensory change, weakness and headaches.        OBJECTIVE:     Vital Signs:  Pulse: 79 (08/10/23 0940)  BP: (!) 192/98 (08/10/23 0940)    Physical Exam  Vitals reviewed.   Constitutional:       General: She is not in acute distress.  HENT:      Head: Normocephalic and atraumatic.      Right Ear: External ear normal.      Left Ear: External ear normal.      Nose: Nose normal.   Eyes:      Conjunctiva/sclera: Conjunctivae normal.      Pupils: Pupils are equal, round, and reactive to light.   Cardiovascular:      Rate and Rhythm: Normal rate and regular rhythm.      Pulses: Normal pulses.      Heart sounds: Normal heart sounds. No murmur heard.     No friction rub. No gallop.   Pulmonary:      Effort: Pulmonary effort is normal.      Breath sounds: Normal breath sounds. No wheezing, rhonchi or rales.   Abdominal:      General: Bowel sounds are normal.      Palpations: There is no mass.      Tenderness: There is no abdominal tenderness.   Musculoskeletal:         General: No swelling or tenderness. Normal range of motion.      Cervical back: Normal range of motion.   Lymphadenopathy:      Cervical:  "No cervical adenopathy.   Skin:     General: Skin is warm and dry.      Findings: No rash.   Neurological:      General: No focal deficit present.      Mental Status: She is alert.         Laboratory:  Hemoglobin A1C   Date Value Ref Range Status   08/10/2023 10.5 (H) 4.0 - 5.6 % Final     Comment:     ADA Screening Guidelines:  5.7-6.4%  Consistent with prediabetes  >or=6.5%  Consistent with diabetes    High levels of fetal hemoglobin interfere with the HbA1C  assay. Heterozygous hemoglobin variants (HbS, HgC, etc)do  not significantly interfere with this assay.   However, presence of multiple variants may affect accuracy.     09/02/2022 9.3 (H) 4.0 - 5.6 % Final     Comment:     ADA Screening Guidelines:  5.7-6.4%  Consistent with prediabetes  >or=6.5%  Consistent with diabetes    High levels of fetal hemoglobin interfere with the HbA1C  assay. Heterozygous hemoglobin variants (HbS, HgC, etc)do  not significantly interfere with this assay.   However, presence of multiple variants may affect accuracy.     01/10/2022 10.2 (H) 4.0 - 5.6 % Final     Comment:     ADA Screening Guidelines:  5.7-6.4%  Consistent with prediabetes  >or=6.5%  Consistent with diabetes    High levels of fetal hemoglobin interfere with the HbA1C  assay. Heterozygous hemoglobin variants (HbS, HgC, etc)do  not significantly interfere with this assay.   However, presence of multiple variants may affect accuracy.         A/P:  Nam was seen today for annual exam. Discussed pt's HTN. Pt endorses compliance but is unsure of medications. Noted to be elevated at every visit previously and never returned for titration. Rec starting additional medication at this time; however, pt stated "Im alive still," and "Im fine, nothings wrong." Discussed cardiovascular complications and additional risks which pt pt expressed understanding of; however, she again deferred additional medication. When discussing her DM she had similar response. Similar response with " cancer screening for Colon and Breast cancer despite risks expressed and understanding reported. After further discussion she is willing to complete w/u for resistant htn and will check BP at home for 2 wks then return for further discussion. Will review labs at that visit and may make medication adjustments.     Diagnoses and all orders for this visit:    Essential hypertension  -     CBC Auto Differential; Future  -     Comprehensive Metabolic Panel; Future  -     lisinopriL (PRINIVIL,ZESTRIL) 40 MG tablet; Take 1 tablet (40 mg total) by mouth once daily.  -     hydroCHLOROthiazide (HYDRODIURIL) 25 MG tablet; Take 1 tablet (25 mg total) by mouth once daily.  -     BLOOD PRESSURE CUFF Misc; 1 kit by Misc.(Non-Drug; Combo Route) route once daily.  -     metoprolol tartrate (LOPRESSOR) 50 MG tablet; Take 1 tablet (50 mg total) by mouth 2 (two) times daily.  -     Urinalysis; Future  -     Calcium, Ionized; Future  -     PTH, intact; Future  -     TSH; Future  -     RENIN; Future  -     Aldosterone; Future  -     METANEPHRINES, PLASMA FREE; Future  -     US Renal Artery Stenosis Hyperten (xpd); Future    Hyperlipidemia, unspecified hyperlipidemia type  -     atorvastatin (LIPITOR) 40 MG tablet; Take 1 tablet (40 mg total) by mouth once daily.    Chronic midline low back pain without sciatica  -     ibuprofen (ADVIL,MOTRIN) 800 MG tablet; Take 1 tablet (800 mg total) by mouth daily as needed.    Type 2 diabetes mellitus without complication, without long-term current use of insulin  -     CBC Auto Differential; Future  -     Comprehensive Metabolic Panel; Future  -     Hemoglobin A1C; Future  -     Microalbumin/creatinine urine ratio; Future  -     metFORMIN (GLUCOPHAGE) 1000 MG tablet; Take 1 tablet (1,000 mg total) by mouth 2 (two) times daily.  -     blood-glucose meter kit; To check BG 4 times daily, to use with insurance preferred meter  -     blood sugar diagnostic Strp; To check BG 4 times daily, to use with  insurance preferred meter    Abnormal bone density screening    Screening for osteoporosis  -     DXA Bone Density Axial Skeleton 1 or more sites; Future      Follow up in about 2 weeks (around 8/24/2023).      Ulysses West MD  [facility], PGY-3  08/10/2023

## 2023-08-14 ENCOUNTER — HOSPITAL ENCOUNTER (OUTPATIENT)
Dept: RADIOLOGY | Facility: HOSPITAL | Age: 68
Discharge: HOME | End: 2023-08-14
Attending: STUDENT IN AN ORGANIZED HEALTH CARE EDUCATION/TRAINING PROGRAM
Payer: MEDICARE

## 2023-08-14 DIAGNOSIS — Z13.820 SCREENING FOR OSTEOPOROSIS: ICD-10-CM

## 2023-08-14 PROCEDURE — 77080 DXA BONE DENSITY AXIAL: CPT | Mod: 26,,, | Performed by: RADIOLOGY

## 2023-08-14 PROCEDURE — 77080 DXA BONE DENSITY AXIAL SKELETON 1 OR MORE SITES: ICD-10-PCS | Mod: 26,,, | Performed by: RADIOLOGY

## 2023-08-14 PROCEDURE — 77080 DXA BONE DENSITY AXIAL: CPT | Mod: TC,PO

## 2023-08-14 NOTE — PROGRESS NOTES
I assume primary medical responsibility for this patient. I have reviewed the history, physical, and assessement & treatment plan with the resident and agree that the care is reasonable and necessary. This service has been performed by a resident with the presence of a teaching physician under the primary care exception. If necessary, an addendum of additional findings or evaluation beyond the resident documentation will be noted below.    Patient with HTN and DM. Prefers lifestyle changes, but BP and DM both uncontrolled. Close follow-up for lab review and further management discussion.      Cordelia Roberts MD    [facility]

## 2023-08-15 ENCOUNTER — TELEPHONE (OUTPATIENT)
Dept: FAMILY MEDICINE | Facility: HOSPITAL | Age: 68
End: 2023-08-15
Payer: OTHER GOVERNMENT

## 2023-08-24 ENCOUNTER — HOSPITAL ENCOUNTER (OUTPATIENT)
Dept: RADIOLOGY | Facility: HOSPITAL | Age: 68
Discharge: HOME | End: 2023-08-24
Attending: STUDENT IN AN ORGANIZED HEALTH CARE EDUCATION/TRAINING PROGRAM
Payer: MEDICARE

## 2023-08-24 DIAGNOSIS — I10 ESSENTIAL HYPERTENSION: ICD-10-CM

## 2023-08-24 PROCEDURE — 93975 VASCULAR STUDY: CPT | Mod: TC,PO

## 2023-08-24 PROCEDURE — 93975 VASCULAR STUDY: CPT | Mod: 26,,, | Performed by: RADIOLOGY

## 2023-08-24 PROCEDURE — 76770 US RENAL ARTERY STENOSIS HYPERTEN (XPD): ICD-10-PCS | Mod: 26,59,, | Performed by: RADIOLOGY

## 2023-08-24 PROCEDURE — 93975 US RENAL ARTERY STENOSIS HYPERTEN (XPD): ICD-10-PCS | Mod: 26,,, | Performed by: RADIOLOGY

## 2023-08-24 PROCEDURE — 76770 US EXAM ABDO BACK WALL COMP: CPT | Mod: 26,59,, | Performed by: RADIOLOGY

## 2023-09-08 ENCOUNTER — PATIENT OUTREACH (OUTPATIENT)
Dept: ADMINISTRATIVE | Facility: HOSPITAL | Age: 68
End: 2023-09-08
Payer: OTHER GOVERNMENT

## 2023-09-12 ENCOUNTER — NURSE TRIAGE (OUTPATIENT)
Dept: ADMINISTRATIVE | Facility: CLINIC | Age: 68
End: 2023-09-12
Payer: OTHER GOVERNMENT

## 2023-09-12 NOTE — TELEPHONE ENCOUNTER
Patient called for results of her recent Bone Density Test and Kidney Ultrasound completed in August 14, 2023. Patient states she has made several unsuccessful attempts to reach her PCP to discuss her test results.     Patient advised that lab/test result review is completed with her PCP only and that case encounter will be routed as High Priority for call back by PCP's office Staff. Patient states understanding of care advice.     Reason for Disposition   Requesting lab results and adult stable (no new symptoms, not worsening)    Additional Information   Negative: New-onset or worsening symptoms, see that protocol (e.g., diarrhea, runny nose, sore throat)   Negative: Medicine question not related to refill or renewal   Negative: Requesting a renewal or refill of a medicine patient is currently taking   Negative: Questions or concerns about high blood pressure   Negative: Nursing judgment   Negative: Nursing judgment   Negative: Nursing judgment    Protocols used: Information Only Call - No Triage-A-OH

## 2023-09-15 ENCOUNTER — TELEPHONE (OUTPATIENT)
Dept: FAMILY MEDICINE | Facility: HOSPITAL | Age: 68
End: 2023-09-15
Payer: OTHER GOVERNMENT

## 2023-09-15 NOTE — TELEPHONE ENCOUNTER
Called patient and scheduled patient. Patient would like  To please give her a call back with results.

## 2023-09-20 ENCOUNTER — OFFICE VISIT (OUTPATIENT)
Dept: FAMILY MEDICINE | Facility: HOSPITAL | Age: 68
End: 2023-09-20
Payer: MEDICARE

## 2023-09-20 VITALS
HEART RATE: 83 BPM | DIASTOLIC BLOOD PRESSURE: 86 MMHG | WEIGHT: 227.06 LBS | HEIGHT: 65 IN | SYSTOLIC BLOOD PRESSURE: 179 MMHG | BODY MASS INDEX: 37.83 KG/M2

## 2023-09-20 DIAGNOSIS — E11.9 TYPE 2 DIABETES MELLITUS WITHOUT COMPLICATION, WITHOUT LONG-TERM CURRENT USE OF INSULIN: Primary | ICD-10-CM

## 2023-09-20 DIAGNOSIS — E78.5 HYPERLIPIDEMIA, UNSPECIFIED HYPERLIPIDEMIA TYPE: ICD-10-CM

## 2023-09-20 PROCEDURE — 99214 OFFICE O/P EST MOD 30 MIN: CPT | Performed by: STUDENT IN AN ORGANIZED HEALTH CARE EDUCATION/TRAINING PROGRAM

## 2023-09-20 RX ORDER — VALACYCLOVIR HYDROCHLORIDE 1 G/1
1000 TABLET, FILM COATED ORAL 3 TIMES DAILY
COMMUNITY
Start: 2023-08-29 | End: 2023-12-07

## 2023-09-22 NOTE — PROGRESS NOTES
[facility]  History & Physical    SUBJECTIVE:     Chief Complaint:   Chief Complaint   Patient presents with    Follow-up    Hypertension       History of Present Illness:  67 y.o. female who  has a past medical history of Diabetes mellitus, type 2 and Hypertension. presents to clinic today for follow up related to DM and HTN. Patient is aware that her BP is elevated today but states that home bp is usually normal. Patient does not like taking medication and is concerned about changing any of her medications or adding new ones. Patient understands the importance of BP and glucose control but is hesitant to take any medication that may cause side effects.      Allergies:  Review of patient's allergies indicates:  No Known Allergies    Home Medications:  Current Outpatient Medications on File Prior to Visit   Medication Sig    atorvastatin (LIPITOR) 40 MG tablet Take 1 tablet (40 mg total) by mouth once daily.    BLOOD PRESSURE CUFF Misc 1 kit by Misc.(Non-Drug; Combo Route) route once daily.    hydroCHLOROthiazide (HYDRODIURIL) 25 MG tablet Take 1 tablet (25 mg total) by mouth once daily.    ibuprofen (ADVIL,MOTRIN) 800 MG tablet Take 1 tablet (800 mg total) by mouth daily as needed.    lisinopriL (PRINIVIL,ZESTRIL) 40 MG tablet Take 1 tablet (40 mg total) by mouth once daily.    metFORMIN (GLUCOPHAGE) 1000 MG tablet Take 1 tablet (1,000 mg total) by mouth 2 (two) times daily.    metoprolol tartrate (LOPRESSOR) 50 MG tablet Take 1 tablet (50 mg total) by mouth 2 (two) times daily.    valACYclovir (VALTREX) 1000 MG tablet Take 1,000 mg by mouth 3 (three) times daily. for seven days    blood sugar diagnostic Strp To check BG 4 times daily, to use with insurance preferred meter (Patient not taking: Reported on 9/20/2023)    blood-glucose meter kit To check BG 4 times daily, to use with insurance preferred meter (Patient not taking: Reported on 9/20/2023)     No current facility-administered medications on file  prior to visit.       Past Medical History:   Diagnosis Date    Diabetes mellitus, type 2     Hypertension      No past surgical history on file.  No family history on file.  Social History     Tobacco Use    Smoking status: Never    Smokeless tobacco: Never   Substance Use Topics    Alcohol use: No    Drug use: No        Review of Systems   Constitutional:  Negative for fever and weight loss.   HENT:  Negative for hearing loss and sore throat.    Eyes:  Negative for blurred vision.   Respiratory:  Negative for cough, shortness of breath and wheezing.    Cardiovascular:  Negative for chest pain and leg swelling.   Gastrointestinal:  Negative for heartburn, nausea and vomiting.   Genitourinary:  Negative for dysuria.   Musculoskeletal:  Negative for back pain, joint pain and myalgias.   Neurological:  Negative for dizziness, weakness and headaches.   Psychiatric/Behavioral:  The patient is nervous/anxious.         OBJECTIVE:     Vital Signs:  Pulse: 83 (09/20/23 1029)  BP: (!) 179/86 (09/20/23 1029)  Body mass index is 37.79 kg/m².  Physical Exam  Constitutional:       Appearance: She is obese.   HENT:      Head: Normocephalic.      Right Ear: External ear normal.      Left Ear: External ear normal.      Nose: Nose normal.      Mouth/Throat:      Mouth: Mucous membranes are moist.   Eyes:      Extraocular Movements: Extraocular movements intact.      Pupils: Pupils are equal, round, and reactive to light.   Cardiovascular:      Rate and Rhythm: Normal rate.   Pulmonary:      Effort: Pulmonary effort is normal.   Abdominal:      Palpations: Abdomen is soft.   Musculoskeletal:         General: Normal range of motion.      Cervical back: Normal range of motion.   Skin:     General: Skin is warm.      Capillary Refill: Capillary refill takes less than 2 seconds.   Neurological:      General: No focal deficit present.      Mental Status: She is alert and oriented to person, place, and time.   Psychiatric:         Mood and  Affect: Mood normal.         Laboratory:  Hemoglobin A1C   Date Value Ref Range Status   08/10/2023 10.5 (H) 4.0 - 5.6 % Final     Comment:     ADA Screening Guidelines:  5.7-6.4%  Consistent with prediabetes  >or=6.5%  Consistent with diabetes    High levels of fetal hemoglobin interfere with the HbA1C  assay. Heterozygous hemoglobin variants (HbS, HgC, etc)do  not significantly interfere with this assay.   However, presence of multiple variants may affect accuracy.     09/02/2022 9.3 (H) 4.0 - 5.6 % Final     Comment:     ADA Screening Guidelines:  5.7-6.4%  Consistent with prediabetes  >or=6.5%  Consistent with diabetes    High levels of fetal hemoglobin interfere with the HbA1C  assay. Heterozygous hemoglobin variants (HbS, HgC, etc)do  not significantly interfere with this assay.   However, presence of multiple variants may affect accuracy.     01/10/2022 10.2 (H) 4.0 - 5.6 % Final     Comment:     ADA Screening Guidelines:  5.7-6.4%  Consistent with prediabetes  >or=6.5%  Consistent with diabetes    High levels of fetal hemoglobin interfere with the HbA1C  assay. Heterozygous hemoglobin variants (HbS, HgC, etc)do  not significantly interfere with this assay.   However, presence of multiple variants may affect accuracy.         A/P:  Nam was seen today for follow-up and hypertension. Thorough discussion about the importance of blood pressure control occurred. Patient does not want to change any BP medication at this time. Patient is willing to start jardiance at this time due to elevated A1c.     Diagnoses and all orders for this visit:    Type 2 diabetes mellitus without complication, without long-term current use of insulin  -     empagliflozin (JARDIANCE) 10 mg tablet; Take 1 tablet (10 mg total) by mouth once daily.    Hyperlipidemia, unspecified hyperlipidemia type  -     Lipid Panel; Future        Follow up in about 1 month (around 10/20/2023).      Austen Chester MD  [facility]  PGY-3  09/22/2023

## 2023-12-07 ENCOUNTER — OFFICE VISIT (OUTPATIENT)
Dept: FAMILY MEDICINE | Facility: HOSPITAL | Age: 68
End: 2023-12-07
Payer: MEDICARE

## 2023-12-07 VITALS — WEIGHT: 224.44 LBS | HEIGHT: 63 IN | BODY MASS INDEX: 39.77 KG/M2

## 2023-12-07 DIAGNOSIS — M54.9 BACK PAIN, UNSPECIFIED BACK LOCATION, UNSPECIFIED BACK PAIN LATERALITY, UNSPECIFIED CHRONICITY: Primary | ICD-10-CM

## 2023-12-07 PROCEDURE — 99214 OFFICE O/P EST MOD 30 MIN: CPT | Performed by: STUDENT IN AN ORGANIZED HEALTH CARE EDUCATION/TRAINING PROGRAM

## 2023-12-07 RX ORDER — DICLOFENAC SODIUM 10 MG/G
2 GEL TOPICAL DAILY
Qty: 200 G | Refills: 3 | Status: SHIPPED | OUTPATIENT
Start: 2023-12-07

## 2023-12-11 NOTE — PROGRESS NOTES
[facility]  History & Physical    SUBJECTIVE:     Chief Complaint:   Chief Complaint   Patient presents with    Back Pain    Hip Pain     RIGHT       History of Present Illness:  68 y.o. female who  has a past medical history of Diabetes mellitus, type 2 and Hypertension. presents to clinic today for hip and back pain. Pt reports these sx have been present for ~4wks. During this time pt developed rash over painful area and was tx'd for shingles by urgent care. She completed vatrex and rash dissipated. She continued to have pain pain and presented to urgent care again who told her it was MSK in nature and gave her muscle relaxers which did not improve sx's. After another week she returned to urgent care and had xray's completed which revealed some degenerative changes in her spine. She was given narcotics at that time which she reports is the only thing which has relieved her sx's. She denies any additional complaints at this time including numbness, tingling, saddle anesthesia, or bowel/bladder dysfunction.       Allergies:  Review of patient's allergies indicates:  No Known Allergies    Home Medications:  Current Outpatient Medications on File Prior to Visit   Medication Sig    atorvastatin (LIPITOR) 40 MG tablet Take 1 tablet (40 mg total) by mouth once daily.    hydroCHLOROthiazide (HYDRODIURIL) 25 MG tablet Take 1 tablet (25 mg total) by mouth once daily.    ibuprofen (ADVIL,MOTRIN) 800 MG tablet Take 1 tablet (800 mg total) by mouth daily as needed.    lisinopriL (PRINIVIL,ZESTRIL) 40 MG tablet Take 1 tablet (40 mg total) by mouth once daily.    metFORMIN (GLUCOPHAGE) 1000 MG tablet Take 1 tablet (1,000 mg total) by mouth 2 (two) times daily.    metoprolol tartrate (LOPRESSOR) 50 MG tablet Take 1 tablet (50 mg total) by mouth 2 (two) times daily.    BLOOD PRESSURE CUFF Misc 1 kit by Misc.(Non-Drug; Combo Route) route once daily. (Patient not taking: Reported on 12/7/2023)    blood sugar diagnostic Strp  To check BG 4 times daily, to use with insurance preferred meter (Patient not taking: Reported on 9/20/2023)    blood-glucose meter kit To check BG 4 times daily, to use with insurance preferred meter (Patient not taking: Reported on 9/20/2023)     No current facility-administered medications on file prior to visit.       Past Medical History:   Diagnosis Date    Diabetes mellitus, type 2     Hypertension      No past surgical history on file.  No family history on file.  Social History     Tobacco Use    Smoking status: Never    Smokeless tobacco: Never   Substance Use Topics    Alcohol use: No    Drug use: No        Review of Systems   Constitutional:  Negative for chills, fever and malaise/fatigue.   HENT:  Negative for congestion, sinus pain and sore throat.    Eyes:  Negative for photophobia.   Respiratory:  Negative for cough, sputum production and shortness of breath.    Cardiovascular:  Negative for chest pain, palpitations and leg swelling.   Gastrointestinal:  Negative for abdominal pain, constipation, diarrhea, nausea and vomiting.   Genitourinary:  Negative for dysuria and hematuria.   Musculoskeletal:  Positive for back pain. Negative for myalgias.   Skin:  Negative for rash.   Neurological:  Negative for sensory change, weakness and headaches.        OBJECTIVE:     Vital Signs:       Physical Exam  Vitals reviewed.   Constitutional:       General: She is not in acute distress.  HENT:      Head: Normocephalic and atraumatic.      Right Ear: External ear normal.      Left Ear: External ear normal.      Nose: Nose normal.   Eyes:      Conjunctiva/sclera: Conjunctivae normal.      Pupils: Pupils are equal, round, and reactive to light.   Cardiovascular:      Rate and Rhythm: Normal rate and regular rhythm.      Pulses: Normal pulses.      Heart sounds: Normal heart sounds. No murmur heard.     No friction rub. No gallop.   Pulmonary:      Effort: Pulmonary effort is normal.      Breath sounds: Normal  breath sounds. No wheezing, rhonchi or rales.   Abdominal:      General: Bowel sounds are normal.      Palpations: There is no mass.      Tenderness: There is no abdominal tenderness.   Musculoskeletal:         General: No swelling or tenderness. Normal range of motion.      Cervical back: Normal range of motion.   Lymphadenopathy:      Cervical: No cervical adenopathy.   Skin:     General: Skin is warm and dry.      Findings: No rash.   Neurological:      General: No focal deficit present.      Mental Status: She is alert.         Laboratory:  Hemoglobin A1C   Date Value Ref Range Status   08/10/2023 10.5 (H) 4.0 - 5.6 % Final     Comment:     ADA Screening Guidelines:  5.7-6.4%  Consistent with prediabetes  >or=6.5%  Consistent with diabetes    High levels of fetal hemoglobin interfere with the HbA1C  assay. Heterozygous hemoglobin variants (HbS, HgC, etc)do  not significantly interfere with this assay.   However, presence of multiple variants may affect accuracy.     09/02/2022 9.3 (H) 4.0 - 5.6 % Final     Comment:     ADA Screening Guidelines:  5.7-6.4%  Consistent with prediabetes  >or=6.5%  Consistent with diabetes    High levels of fetal hemoglobin interfere with the HbA1C  assay. Heterozygous hemoglobin variants (HbS, HgC, etc)do  not significantly interfere with this assay.   However, presence of multiple variants may affect accuracy.     01/10/2022 10.2 (H) 4.0 - 5.6 % Final     Comment:     ADA Screening Guidelines:  5.7-6.4%  Consistent with prediabetes  >or=6.5%  Consistent with diabetes    High levels of fetal hemoglobin interfere with the HbA1C  assay. Heterozygous hemoglobin variants (HbS, HgC, etc)do  not significantly interfere with this assay.   However, presence of multiple variants may affect accuracy.         A/P:  Nam was seen today for back pain and hip pain. Discussed pt prolonged course of pain and possibility of neuropathic pain related to shingles dx vs chronic pain from degenerative  changes. Will give pt referral for PT and topical prescription. Discouraged use of narcotics at this time. Strict return precautions given and pt expressed understanding. Also had lengthy discussion involving healthcare maintenance and screenings. Pt very hesitant to follow any recommendations despite expressing understanding of risks. Will continue to encourage compliance with each visit.     Diagnoses and all orders for this visit:    Back pain, unspecified back location, unspecified back pain laterality, unspecified chronicity  -     diclofenac sodium (VOLTAREN) 1 % Gel; Apply 2 g topically once daily.  -     Ambulatory referral/consult to Physical/Occupational Therapy; Future        Ulysses West MD  [facility], PGY-3  12/11/2023

## 2023-12-18 NOTE — PROGRESS NOTES
I assume primary medical responsibility for this patient. I have reviewed the history, physical, and assessement & treatment plan with the resident and agree that the care is reasonable and necessary. This service has been performed by a resident without the presence of a teaching physician under the primary care exception. If necessary, an addendum of additional findings or evaluation beyond the resident documentation will be noted below.      Cordelia oRberts MD    [facility]

## 2024-01-02 ENCOUNTER — CLINICAL SUPPORT (OUTPATIENT)
Dept: REHABILITATION | Facility: HOSPITAL | Age: 69
End: 2024-01-02
Payer: MEDICARE

## 2024-01-02 DIAGNOSIS — M53.86 DECREASED ROM OF LUMBAR SPINE: ICD-10-CM

## 2024-01-02 DIAGNOSIS — R29.898 WEAKNESS OF BOTH HIPS: Primary | ICD-10-CM

## 2024-01-02 DIAGNOSIS — M53.84 DECREASED ROM OF THORACIC SPINE: ICD-10-CM

## 2024-01-02 DIAGNOSIS — M54.9 BACK PAIN, UNSPECIFIED BACK LOCATION, UNSPECIFIED BACK PAIN LATERALITY, UNSPECIFIED CHRONICITY: ICD-10-CM

## 2024-01-02 PROCEDURE — 97161 PT EVAL LOW COMPLEX 20 MIN: CPT | Mod: PO

## 2024-01-02 PROCEDURE — 97112 NEUROMUSCULAR REEDUCATION: CPT | Mod: PO

## 2024-01-02 NOTE — PLAN OF CARE
[facility]  Physical Therapy Initial Evaluation    Name: Nam William  Clinic Number: [id number]    Therapy Diagnosis:   Encounter Diagnosis   Name Primary?    Back pain, unspecified back location, unspecified back pain laterality, unspecified chronicity      Physician: Ulysses West MD    Physician Orders: PT Eval and Treat   Medical Diagnosis from Referral: Back pain, unspecified back location, unspecified back pain laterality, unspecified chronicity [M54.9]   Evaluation Date: 1/2/2024  Authorization Period Expiration: 12/6/2024  Plan of Care Expiration: 3/6/2024  Visit # / Visits authorized: 1/ Eval    Time In: 1100  Time Out: 1200  Total Billable Time: 60 minutes    Precautions: Standard and Diabetes,     Subjective   Date of onset: Since Aug  History of current condition - Nam reports: Onset of R sided TL junction pain with intermittent radiating pain around the R side of the torso that came on gradually after work one day but cannot identify a specific TRAY. Pt reports also experiencing intermittent tingling by the spine and occasionally around the R side. Pt reports a worsening of sx with long periods of sitting and with relaxing,but has difficulty identifying specific motions that are limited by back pain,  Pt reports no current fitness or walking routine. Pt reports being current with medications and routine f/u for diabetes. Pt was screened for kidney issues 2/2 to location of the sx but results were unremarkable.       Past Medical History:   Diagnosis Date    Diabetes mellitus, type 2     Hypertension      Nam William  has no past surgical history on file.    Nam has a current medication list which includes the following prescription(s): atorvastatin, blood pressure cuff, blood sugar diagnostic, blood-glucose meter, diclofenac sodium, hydrochlorothiazide, ibuprofen, lisinopril, metformin, and metoprolol tartrate.    Review of patient's allergies indicates:  No Known  Allergies     Imaging, : Most recent lumbar imaging from 3/18/2021    XR LUMBAR SPINE AP AND LATERAL     CLINICAL HISTORY:  Back pain or radiculopathy, < 6 wks, uncomplicated;     COMPARISON:  No comparison studies are available.     FINDINGS:  There are 5 weight bearing lumbar vertebra.  Convex-right curvature of the thoracolumbar junction.  Multilevel marginal spondylosis.  Partially lumbarized S1 vertebral body.  The vertebral body heights and intervertebral disc heights are   well-maintained. Negative for spondylolysis or spondylolisthesis. The sacral ala and sacroiliac joints are intact. The bowel gas pattern is normal.     Vascular calcifications are present without aneurysmal change.     Impression:     1.  Negative for acute process involving the lumbar spine.     2.  Mild degenerative changes of the lumbar spine as detailed above.     3.  Incidental findings as noted above.        Electronically signed by: Jose Salamanca MD  Date:                                            03/18/2021  Time:                                           12:38    Prior Therapy: Not for current condition  Social History: Lives alone  Occupation: Retired/ ; likes to cut grass  Prior Level of Function: Independent with ADLs/IADLs, recreational and improvement activities  Current Level of Function: Limited with ADLs/IADLs, recreational and home improvement activities    Pain:  Current 3/10, worst 3/10, best 0/10   Location: right trunk and TL junction   Description: Burning, Tingling, and Variable  Aggravating Factors: Sitting and Bending  Easing Factors: pain medication and movement    Pts goals: Pain free ADLs    Objective     Observation: Pt presents A&Ox 4 w/ positive affect ; session limited by vertigo like sx    Pt ambulates with increased R pelvic Rot on swing through phase     Posture:  Increased lordotic curve w/ decreased kyphotic curve    Lumbar Range of Motion:    Limitations Pain   Flexion 40%   Unremarkable          Extension 25%   Unremarkable         Left Side Bending 25% Unremarkable         Right Side Bending 10% Unremarkable         Left Rotation 15% Unremarkable    Right Rotation 35%  Unremarkable    Left Quadrant 10% Unremarkable    Right Quadrant 10 Unremarkable    *pt denied increased pain with specific motions, although pt reported slight increase of pain by end of testing    Thoracic spine ROM limited in all directions    Lower Extremity Strength  Right LE  Left LE    Quadriceps: 4+/5 Quadriceps: 4+/5   Hamstrings: 4-/5 Hamstrings: 4-/5   Iliospoas: 4/5 Iliospoas: 4/5   Hip extension:  3-/5 Hip extension: 3-/5   PGM: 3-/5 PGM: 3-/5   Ankle dorsiflexion: 4+/5 Ankle dorsiflexion: 4+/5   Ankle plantarflexion: 4+/5 Ankle plantarflexion: 4+/5     Sensation:Grossly intact    Reflexes:  -Patellar (L3-L4): Diminished  -Achilles (S1): Diminished    Special Tests:  -SLR Test: Neg  -Repeated Flexion: Unremarkable  -Repeated Ext: Unremarkable  -Bridge Test: Unable to perform at this time 2/2 to increased vertigo like sx  -Slump Test: NT      Palpation:   -Erector Spinae: TTP along TL junction    Flexibility:   -Ely's test: R = + ; L = +  -Hamstring : R = + ; L = +  -Pete's test: R = - ; L = -    Functional:  Squat: Increased forward lean  SLS: R- 2s ; L- 3s         CMS Impairment/Limitation/Restriction for FOTO Lumbar Survey    Therapist reviewed FOTO scores for Nam KAPLAN Crews on 1/2/2024.   FOTO documents entered into [facility] - see Media section.    Limitation Score: 17%           TREATMENT   Treatment Time In: 1100  Treatment Time Out: 1200  Total Treatment time separate from Evaluation: 25 minutes      Nam participated in neuromuscular re-education activities to improve: Coordination, Kinesthetic, Sense, Proprioception, and Posture for 25 minutes. The following activities were included:    Sit <> stand 2 x 5 (pt educated on preventing excessive forward lean)  Lateral band walks RTB 2 laps (pt educated on controlled stepping)            Home Exercises and Patient Education Provided    Education provided re: As mentioned above, goals for therapy, role of therapy for care, exercises/HEP    Written Home Exercises Provided: Yes .  Exercises were reviewed and Nam was able to demonstrate them prior to the end of the session.   Pt received a written copy of exercises to perform at home. Nam demonstrated good  understanding of the education provided.     See EMR under patient instructions for exercises given.   Assessment   Nam is a 68 y.o. female referred to outpatient Physical Therapy with a medical diagnosis of Back pain, unspecified back location, unspecified back pain laterality, unspecified chronicity [M54.9] . Pt presents with limited lumbar and thoracic ROM, increased thoracolumbar pain, hip and LE weakness, decreased balance, hamstring and quad tightness, tenderness of thoracolumbar paraspinals with limitations in ADLs/IADLs and transfers with limited ability to participate in recreational and fitness related activities.    Pt prognosis is Good.   Pt will benefit from skilled outpatient Physical Therapy to address the deficits stated above and in the chart below, provide pt/family education, and to maximize pt's level of independence.     Plan of care discussed with patient: Yes  Pt's spiritual, cultural and educational needs considered and patient is agreeable to the plan of care and goals as stated below:     Anticipated Barriers for therapy: co-morbidities    Medical Necessity is demonstrated by the following  History  Co-morbidities and personal factors that may impact the plan of care Co-morbidities:   diabetes, high BMI, and HTN    Personal Factors:   lifestyle     low   Examination  Body Structures and Functions, activity limitations and participation restrictions that may impact the plan of care Body Regions:   lower extremities  trunk    Body Systems:    gross symmetry  ROM  strength  gross coordinated  movement  balance  gait  transfers  transitions  motor control    Participation Restrictions:   Recreational and fitness related activities    Activity limitations:   Learning and applying knowledge  no deficits    General Tasks and Commands  no deficits    Communication  no deficits    Mobility  lifting and carrying objects  walking    Self care  washing oneself (bathing, drying, washing hands)  dressing    Domestic Life  doing house work (cleaning house, washing dishes, laundry)    Interactions/Relationships  no deficits    Life Areas  no deficits    Community and Social Life  community life  recreation and leisure         moderate   Clinical Presentation stable and uncomplicated low   Decision Making/ Complexity Score: low     Goals:  GOALS: Short Term Goals:  4-6 weeks  1.Report decreased thoracolumbar pain  < / =  2/10  to increase tolerance for ADLs  2. Increase ROM by 10% where limited in order to perform ADLs without difficulty.  3. Increase strength by 1/3 MMT grade in gluts  to increase tolerance for ADL and work activities.  4. Pt to tolerate HEP to improve ROM and independence with ADL's    Long Term Goals: 10-12 weeks  1.Report decreased thoracolumbar pain < / = 0/10  to increase tolerance for ADLs  2.Patient goal: Pain free ADLs  3.Increase strength to 4/5 in  gluts  to increase tolerance for ADL and work activities.  4. Pt will report at 15-25% limitations on FOTO  to demonstrate increase in LE function with every day tasks.      Plan   Plan of care Certification: 1/2/2024 to 3/6/2024.    Outpatient Physical Therapy 1 times weekly for 12 weeks to include the following interventions: Gait Training, Manual Therapy, Moist Heat/ Ice, Neuromuscular Re-ed, Patient Education, Therapeutic Activities, and Therapeutic Exercise.     Jeremy Weaver, PT, DPT

## 2024-08-08 ENCOUNTER — OFFICE VISIT (OUTPATIENT)
Dept: FAMILY MEDICINE | Facility: HOSPITAL | Age: 69
End: 2024-08-08
Payer: OTHER GOVERNMENT

## 2024-08-08 VITALS
HEART RATE: 75 BPM | DIASTOLIC BLOOD PRESSURE: 78 MMHG | BODY MASS INDEX: 39.69 KG/M2 | SYSTOLIC BLOOD PRESSURE: 161 MMHG | WEIGHT: 224 LBS | HEIGHT: 63 IN | OXYGEN SATURATION: 98 % | RESPIRATION RATE: 18 BRPM

## 2024-08-08 DIAGNOSIS — Z11.59 ENCOUNTER FOR HEPATITIS C SCREENING TEST FOR LOW RISK PATIENT: ICD-10-CM

## 2024-08-08 DIAGNOSIS — E11.9 TYPE 2 DIABETES MELLITUS WITHOUT COMPLICATION, WITHOUT LONG-TERM CURRENT USE OF INSULIN: ICD-10-CM

## 2024-08-08 DIAGNOSIS — E78.5 HYPERLIPIDEMIA, UNSPECIFIED HYPERLIPIDEMIA TYPE: ICD-10-CM

## 2024-08-08 DIAGNOSIS — E66.01 SEVERE OBESITY (BMI 35.0-39.9) WITH COMORBIDITY: ICD-10-CM

## 2024-08-08 DIAGNOSIS — I10 ESSENTIAL HYPERTENSION: Primary | ICD-10-CM

## 2024-08-08 PROCEDURE — 99214 OFFICE O/P EST MOD 30 MIN: CPT

## 2024-08-08 RX ORDER — BLOOD-GLUCOSE SENSOR
1 EACH MISCELLANEOUS DAILY
Qty: 5 EACH | Refills: 3 | Status: SHIPPED | OUTPATIENT
Start: 2024-08-08 | End: 2025-08-08

## 2024-08-08 RX ORDER — METFORMIN HYDROCHLORIDE 1000 MG/1
1000 TABLET ORAL 2 TIMES DAILY
Qty: 180 TABLET | Refills: 3 | Status: SHIPPED | OUTPATIENT
Start: 2024-08-08 | End: 2025-08-08

## 2024-08-08 RX ORDER — METOPROLOL TARTRATE 50 MG/1
50 TABLET ORAL 2 TIMES DAILY
Qty: 180 TABLET | Refills: 3 | Status: SHIPPED | OUTPATIENT
Start: 2024-08-08 | End: 2025-08-08

## 2024-08-08 RX ORDER — ATORVASTATIN CALCIUM 80 MG/1
80 TABLET, FILM COATED ORAL DAILY
Qty: 90 TABLET | Refills: 3 | Status: SHIPPED | OUTPATIENT
Start: 2024-08-08 | End: 2025-08-08

## 2024-08-08 RX ORDER — LISINOPRIL 40 MG/1
40 TABLET ORAL DAILY
Qty: 90 TABLET | Refills: 3 | Status: SHIPPED | OUTPATIENT
Start: 2024-08-08 | End: 2025-08-08

## 2024-08-08 RX ORDER — HYDROCHLOROTHIAZIDE 25 MG/1
25 TABLET ORAL DAILY
Qty: 90 TABLET | Refills: 3 | Status: SHIPPED | OUTPATIENT
Start: 2024-08-08 | End: 2025-08-08

## 2024-08-12 DIAGNOSIS — M54.50 CHRONIC MIDLINE LOW BACK PAIN WITHOUT SCIATICA: ICD-10-CM

## 2024-08-12 DIAGNOSIS — G89.29 CHRONIC MIDLINE LOW BACK PAIN WITHOUT SCIATICA: ICD-10-CM

## 2024-08-12 NOTE — TELEPHONE ENCOUNTER
----- Message from Kourtney Raquel sent at 8/12/2024 10:12 AM CDT -----  Type:  RX Refill Request    Who Called: Pt   Refill or New Rx: Refill   RX Name and Strength:ibuprofen (ADVIL,MOTRIN) 800 MG tablet  Preferred Pharmacy with phone number:[facility] [place] - [phone] [facility]  Local or Mail Order: Mail   Ordering Provider: Sterling   Would the patient rather a call back or a response via [facility]? Call   Best Call Back Number: [phone]   Additional Information: pt would like to notified when rx is fufilled

## 2024-08-15 DIAGNOSIS — G89.29 CHRONIC MIDLINE LOW BACK PAIN WITHOUT SCIATICA: ICD-10-CM

## 2024-08-15 DIAGNOSIS — M54.50 CHRONIC MIDLINE LOW BACK PAIN WITHOUT SCIATICA: ICD-10-CM

## 2024-08-16 RX ORDER — IBUPROFEN 800 MG/1
800 TABLET ORAL DAILY PRN
Qty: 90 TABLET | Refills: 3 | OUTPATIENT
Start: 2024-08-16

## 2024-08-16 RX ORDER — IBUPROFEN 800 MG/1
800 TABLET ORAL DAILY PRN
Qty: 90 TABLET | Refills: 3 | Status: SHIPPED | OUTPATIENT
Start: 2024-08-16

## 2024-10-23 ENCOUNTER — TELEPHONE (OUTPATIENT)
Dept: FAMILY MEDICINE | Facility: HOSPITAL | Age: 69
End: 2024-10-23
Payer: OTHER GOVERNMENT

## 2024-10-23 NOTE — TELEPHONE ENCOUNTER
Msg left for this patient regarding a follow up visit with Dr. Katz.  It has been scheduled for 11/27.  ----- Message from Raúl sent at 10/23/2024 10:16 AM CDT -----  .Type:  Needs Medical Advice    Who Called: pt    Would the patient rather a call back or a response via [facility]? Call back  Best Call Back Number: [phone]  Additional Information:     Pt stated she received a call about making an appt and wants to know why she have to make an appt

## 2024-10-23 NOTE — TELEPHONE ENCOUNTER
Pt declines to come in for a follow up. She states she will call to schedule when she feels she needs to be seen. ----- Message from Kourtney sent at 10/23/2024 10:56 AM CDT -----  Type:  Pt cancelled     Who Called: Pt   Does the patient know what this is regarding?: pt cancelled apt on 11/27 because she is unsure why she needs a follow up for   Would the patient rather a call back or a response via [facility]? Call   Best Call Back Number: [phone]   Additional Information:

## 2025-03-03 ENCOUNTER — HOSPITAL ENCOUNTER (EMERGENCY)
Facility: HOSPITAL | Age: 70
Discharge: HOME | End: 2025-03-03
Attending: EMERGENCY MEDICINE
Payer: OTHER GOVERNMENT

## 2025-03-03 VITALS
OXYGEN SATURATION: 98 % | DIASTOLIC BLOOD PRESSURE: 124 MMHG | TEMPERATURE: 98 F | WEIGHT: 200 LBS | SYSTOLIC BLOOD PRESSURE: 227 MMHG | RESPIRATION RATE: 18 BRPM | BODY MASS INDEX: 33.32 KG/M2 | HEIGHT: 65 IN | HEART RATE: 73 BPM

## 2025-03-03 DIAGNOSIS — M54.31 SCIATICA OF RIGHT SIDE: Primary | ICD-10-CM

## 2025-03-03 PROCEDURE — 63600175 PHARM REV CODE 636 W HCPCS: Mod: ER | Performed by: EMERGENCY MEDICINE

## 2025-03-03 PROCEDURE — 96372 THER/PROPH/DIAG INJ SC/IM: CPT | Performed by: EMERGENCY MEDICINE

## 2025-03-03 PROCEDURE — 99284 EMERGENCY DEPT VISIT MOD MDM: CPT | Mod: 25,ER

## 2025-03-03 RX ORDER — KETOROLAC TROMETHAMINE 30 MG/ML
15 INJECTION, SOLUTION INTRAMUSCULAR; INTRAVENOUS
Status: COMPLETED | OUTPATIENT
Start: 2025-03-03 | End: 2025-03-03

## 2025-03-03 RX ORDER — DEXAMETHASONE SODIUM PHOSPHATE 4 MG/ML
8 INJECTION, SOLUTION INTRA-ARTICULAR; INTRALESIONAL; INTRAMUSCULAR; INTRAVENOUS; SOFT TISSUE
Status: COMPLETED | OUTPATIENT
Start: 2025-03-03 | End: 2025-03-03

## 2025-03-03 RX ADMIN — KETOROLAC TROMETHAMINE 15 MG: 30 INJECTION, SOLUTION INTRAMUSCULAR; INTRAVENOUS at 09:03

## 2025-03-03 RX ADMIN — DEXAMETHASONE SODIUM PHOSPHATE 8 MG: 4 INJECTION, SOLUTION INTRA-ARTICULAR; INTRALESIONAL; INTRAMUSCULAR; INTRAVENOUS; SOFT TISSUE at 09:03

## 2025-03-03 NOTE — ED PROVIDER NOTES
Emergency Department Encounter  Provider Note  Encounter Date: 3/3/2025    Patient Name: Nam William  MRN: [id number]    History of Present Illness   HPI  History of Present Illness:    Chief Complaint:   Chief Complaint   Patient presents with    Back Pain     C/o right lower back pain radiating down right leg.      69-year-old female presenting with 2 weeks of lower back pain radiating down the side of her leg.  Denies any urinary incontinence, numbness in the perineal area, falls.  Says she has had back pain and was told she had arthritis.  Has taken over-the-counter medications at home without improvement in symptoms.    The following PMH/PSH/SocHx/FamHx has been reviewed by myself:  Past Medical History:   Diagnosis Date    Diabetes mellitus, type 2     Hypertension      History reviewed. No pertinent surgical history.  Social History[1]  No family history on file.  Allergies reviewed:  Review of patient's allergies indicates:  No Known Allergies  Medications reviewed:  Discharge Medication List as of 3/3/2025  9:28 AM        CONTINUE these medications which have NOT CHANGED    Details   atorvastatin (LIPITOR) 80 MG tablet Take 1 tablet (80 mg total) by mouth once daily., Starting Thu 8/8/2024, Until Fri 8/8/2025, Normal      BLOOD PRESSURE CUFF Misc 1 kit by Misc.(Non-Drug; Combo Route) route once daily., Starting Thu 8/10/2023, OTC      blood sugar diagnostic Strp To check BG 4 times daily, to use with insurance preferred meter, Normal      blood-glucose meter kit To check BG 4 times daily, to use with insurance preferred meter, Normal      blood-glucose sensor (FREESTYLE MAGDIEL 3 SENSOR) Edith 1 each by Misc.(Non-Drug; Combo Route) route once daily., Starting Thu 8/8/2024, Until Fri 8/8/2025, Normal      hydroCHLOROthiazide (HYDRODIURIL) 25 MG tablet Take 1 tablet (25 mg total) by mouth once daily., Starting Thu 8/8/2024, Until Fri 8/8/2025, Normal      ibuprofen (ADVIL,MOTRIN) 800 MG tablet Take 1 tablet (800  mg total) by mouth daily as needed., Starting Fri 8/16/2024, Normal      lisinopriL (PRINIVIL,ZESTRIL) 40 MG tablet Take 1 tablet (40 mg total) by mouth once daily., Starting Thu 8/8/2024, Until Fri 8/8/2025, Normal      metFORMIN (GLUCOPHAGE) 1000 MG tablet Take 1 tablet (1,000 mg total) by mouth 2 (two) times daily., Starting Thu 8/8/2024, Until Fri 8/8/2025, Normal      metoprolol tartrate (LOPRESSOR) 50 MG tablet Take 1 tablet (50 mg total) by mouth 2 (two) times daily., Starting Thu 8/8/2024, Until Fri 8/8/2025, Normal             Physical Exam     Initial Vitals [03/03/25 0836]   BP Pulse Resp Temp SpO2   (!) 227/124 73 18 98.1 °F (36.7 °C) 98 %      MAP       --           Triage vital signs reviewed.    Constitutional: Well-nourished, well-developed. Not in acute distress.  HENT: Normocephalic, atraumatic. Moist mucous membranes.  Eyes: No conjunctival injection.  Resp: Normal respiratory effort, breathing unlabored.  Cardio: Regular rate and rhythm.  GI: Abdomen non-distended.   MSK: Extremities without any deformities noted. No lower extremity edema.  Skin: Warm and dry. No rashes or lesions noted.  Neuro: Awake and alert. Moves all extremities.    ED Course   Procedures    Medical Decision Making    History Acquisition     The history is provided by the patient.     Review of prior external/non ED notes:  History of chronic midline low back pain, seen by PCP in 2024.  X-ray 2021 of the lumbar spine shows mild degenerative changes of the lumbar spine.    Differential Diagnoses   Based on available information and initial assessment, the working Differential Diagnosis includes, but is not limited to:  Cauda equina syndrome, diskitis/osteomyelitis, epidural/paraspinal abscess, AAA, aortic dissection, post-op/hardware infection, trauma/vertebral fracture, spinal cord injury, disc herniation, spinal stenosis, sciatica, radiculopathy, neoplasm, lumbar muscle strain, muscle spasm, neuropathic pain,  UTI/pyelonephritis, nephrolithiasis.    EKG   EKG ordered and independently reviewed by me:     Labs   Lab tests ordered and independently reviewed by me:    Labs Reviewed - No data to display    Imaging   Imaging ordered and independently reviewed by me:   Imaging Results    None            Additional Consideration   Nam William  presents to the Emergency Department today with chronic low back pain now radiating down the right leg.  No signs or symptoms of cauda equina or infection.  Likely radiculopathy.  No indication for imaging today, counseled on alternating Tylenol and ibuprofen, risks discussed about IM Decadron given that she has diabetes, patient consented.  Will follow up with PCP as she may need an MRI.    Additional testing considered but not indicated during the course of this workup: further imaging and labwork, not indicated  Co-morbidities/chronic illness/exacerbation of chronic illness considered which impacted clinical decision making:  Hypertension, age  Procedures done in the ED or plan for the OR: No  Social determinants of care considered during development of treatment plan include: Decreased medical literacy  Discussion of management or test interpretation with external provider: No  DNR discussion: No    The patient's list of active medications and allergies as documented per RN staff has been reviewed.  Medications given in the ED and/or prescribed:   Medications   ketorolac injection 15 mg (15 mg Intramuscular Given 3/3/25 0911)   dexAMETHasone injection 8 mg (8 mg Intramuscular Given 3/3/25 0911)                  Explanation of disposition: Discharge    Clinical Impression:     1. Sciatica of right side         All results from the workup were reviewed with the patient/family in detail. I discussed with the patient and/or the family/caregiver that today's evaluation in the ED does not suggest any emergent or life-threatening medical conditions that would require hospitalization or  immediate intervention beyond what was provided in the ED. I believe the patient is safe for discharge.  However, a reassuring evaluation in the ED does not preclude the presence or development of a serious or life-threatening condition. As such, strict return precautions were discussed with the patient expressing understanding of instructions, and all questions answered. The patient/family communicates understanding of all this information and all remaining questions and concerns were addressed at this time.    The patient/family has been provided with verbal and printed direction regarding our final diagnosis(es) as well as instructions regarding use of OTC and/or Rx medications intended to manage the patient's aforementioned conditions including:  ED Prescriptions    None       The patient's condition does not warrant review of the  and prescription of controlled substances.      ED Disposition Condition    Discharge Stable                 [1]   Social History  Tobacco Use    Smoking status: Never    Smokeless tobacco: Never   Substance Use Topics    Alcohol use: No    Drug use: No        Sharyn Deal MD  03/03/25 2596

## 2025-03-03 NOTE — DISCHARGE INSTRUCTIONS
You can take Tylenol 1 gram every 8 hours, and ibuprofen 800 mg every 8 hours; this means you can take pain medicine once every 4 hours.    Talk to your doctor again, you may need an MRI of your back.

## 2025-03-04 ENCOUNTER — HOSPITAL ENCOUNTER (EMERGENCY)
Facility: HOSPITAL | Age: 70
Discharge: HOME | End: 2025-03-05
Attending: EMERGENCY MEDICINE
Payer: OTHER GOVERNMENT

## 2025-03-04 VITALS
SYSTOLIC BLOOD PRESSURE: 225 MMHG | HEART RATE: 82 BPM | WEIGHT: 200 LBS | DIASTOLIC BLOOD PRESSURE: 102 MMHG | OXYGEN SATURATION: 96 % | RESPIRATION RATE: 16 BRPM | TEMPERATURE: 98 F | HEIGHT: 65 IN | BODY MASS INDEX: 33.32 KG/M2

## 2025-03-04 DIAGNOSIS — M51.362 DEGENERATION OF INTERVERTEBRAL DISC OF LUMBAR REGION WITH DISCOGENIC BACK PAIN AND LOWER EXTREMITY PAIN: Primary | ICD-10-CM

## 2025-03-04 DIAGNOSIS — I10 POORLY-CONTROLLED HYPERTENSION: ICD-10-CM

## 2025-03-04 LAB
ANION GAP SERPL CALC-SCNC: 10 MMOL/L (ref 8–16)
CALCIUM SERPL-MCNC: 9.4 MG/DL (ref 8.7–10.5)
CHLORIDE SERPL-SCNC: 97 MMOL/L (ref 95–110)
CO2 SERPL-SCNC: 27 MMOL/L (ref 23–29)
CREAT SERPL-MCNC: 1.01 MG/DL (ref 0.5–1.4)
EST. GFR  (NO RACE VARIABLE): >60 ML/MIN/1.73 M^2
GLUCOSE SERPL-MCNC: 310 MG/DL (ref 70–110)
POTASSIUM SERPL-SCNC: 3.9 MMOL/L (ref 3.5–5.1)
SODIUM SERPL-SCNC: 134 MMOL/L (ref 136–145)
UUN UR-MCNC: 23 MG/DL (ref 7–17)

## 2025-03-04 PROCEDURE — 96372 THER/PROPH/DIAG INJ SC/IM: CPT | Performed by: EMERGENCY MEDICINE

## 2025-03-04 PROCEDURE — 80048 BASIC METABOLIC PNL TOTAL CA: CPT | Mod: ER | Performed by: EMERGENCY MEDICINE

## 2025-03-04 PROCEDURE — 96374 THER/PROPH/DIAG INJ IV PUSH: CPT | Mod: ER

## 2025-03-04 PROCEDURE — 63600175 PHARM REV CODE 636 W HCPCS: Mod: ER | Performed by: EMERGENCY MEDICINE

## 2025-03-04 PROCEDURE — 99284 EMERGENCY DEPT VISIT MOD MDM: CPT | Mod: 25,ER

## 2025-03-04 RX ORDER — MORPHINE SULFATE 4 MG/ML
2 INJECTION, SOLUTION INTRAMUSCULAR; INTRAVENOUS
Status: COMPLETED | OUTPATIENT
Start: 2025-03-04 | End: 2025-03-04

## 2025-03-04 RX ORDER — MORPHINE SULFATE 4 MG/ML
2 INJECTION, SOLUTION INTRAMUSCULAR; INTRAVENOUS
Status: DISCONTINUED | OUTPATIENT
Start: 2025-03-04 | End: 2025-03-04

## 2025-03-04 RX ORDER — MORPHINE SULFATE 4 MG/ML
2 INJECTION, SOLUTION INTRAMUSCULAR; INTRAVENOUS
Refills: 0 | Status: DISCONTINUED | OUTPATIENT
Start: 2025-03-04 | End: 2025-03-04

## 2025-03-04 RX ORDER — ORPHENADRINE CITRATE 30 MG/ML
30 INJECTION INTRAMUSCULAR; INTRAVENOUS
Status: COMPLETED | OUTPATIENT
Start: 2025-03-04 | End: 2025-03-04

## 2025-03-04 RX ORDER — CYCLOBENZAPRINE HCL 10 MG
10 TABLET ORAL 3 TIMES DAILY PRN
Qty: 15 TABLET | Refills: 0 | Status: SHIPPED | OUTPATIENT
Start: 2025-03-04 | End: 2025-03-05

## 2025-03-04 RX ORDER — MELOXICAM 7.5 MG/1
7.5 TABLET ORAL DAILY
Qty: 7 TABLET | Refills: 0 | Status: SHIPPED | OUTPATIENT
Start: 2025-03-04 | End: 2025-03-05

## 2025-03-04 RX ADMIN — ORPHENADRINE CITRATE 30 MG: 60 INJECTION INTRAMUSCULAR; INTRAVENOUS at 10:03

## 2025-03-04 RX ADMIN — MORPHINE SULFATE 2 MG: 4 INJECTION INTRAVENOUS at 10:03

## 2025-03-04 RX ADMIN — MORPHINE SULFATE 2 MG: 4 INJECTION INTRAVENOUS at 11:03

## 2025-03-05 ENCOUNTER — HOSPITAL ENCOUNTER (EMERGENCY)
Facility: HOSPITAL | Age: 70
Discharge: HOME | End: 2025-03-06
Attending: STUDENT IN AN ORGANIZED HEALTH CARE EDUCATION/TRAINING PROGRAM
Payer: OTHER GOVERNMENT

## 2025-03-05 DIAGNOSIS — M54.30 SCIATICA, UNSPECIFIED LATERALITY: Primary | ICD-10-CM

## 2025-03-05 PROCEDURE — 99284 EMERGENCY DEPT VISIT MOD MDM: CPT | Mod: ER

## 2025-03-05 PROCEDURE — 25000003 PHARM REV CODE 250: Mod: ER | Performed by: STUDENT IN AN ORGANIZED HEALTH CARE EDUCATION/TRAINING PROGRAM

## 2025-03-05 RX ORDER — CLONIDINE HYDROCHLORIDE 0.1 MG/1
0.1 TABLET ORAL
Status: COMPLETED | OUTPATIENT
Start: 2025-03-05 | End: 2025-03-05

## 2025-03-05 RX ORDER — MELOXICAM 7.5 MG/1
7.5 TABLET ORAL DAILY
Qty: 7 TABLET | Refills: 0 | Status: SHIPPED | OUTPATIENT
Start: 2025-03-05 | End: 2025-03-12

## 2025-03-05 RX ORDER — TRAMADOL HYDROCHLORIDE 50 MG/1
50 TABLET ORAL
Status: COMPLETED | OUTPATIENT
Start: 2025-03-05 | End: 2025-03-05

## 2025-03-05 RX ORDER — GABAPENTIN 300 MG/1
300 CAPSULE ORAL 3 TIMES DAILY
Qty: 42 CAPSULE | Refills: 0 | Status: SHIPPED | OUTPATIENT
Start: 2025-03-05 | End: 2025-03-13 | Stop reason: SDUPTHER

## 2025-03-05 RX ORDER — TRAMADOL HYDROCHLORIDE 50 MG/1
50 TABLET ORAL EVERY 8 HOURS PRN
Qty: 9 TABLET | Refills: 0 | Status: SHIPPED | OUTPATIENT
Start: 2025-03-05 | End: 2025-03-13 | Stop reason: SDUPTHER

## 2025-03-05 RX ORDER — CYCLOBENZAPRINE HCL 10 MG
10 TABLET ORAL 3 TIMES DAILY PRN
Qty: 15 TABLET | Refills: 0 | Status: SHIPPED | OUTPATIENT
Start: 2025-03-05 | End: 2025-03-10

## 2025-03-05 RX ORDER — NAPROXEN 500 MG/1
500 TABLET ORAL 2 TIMES DAILY
Qty: 10 TABLET | Refills: 0 | Status: SHIPPED | OUTPATIENT
Start: 2025-03-05 | End: 2025-03-13 | Stop reason: SDUPTHER

## 2025-03-05 RX ORDER — GABAPENTIN 300 MG/1
300 CAPSULE ORAL
Status: COMPLETED | OUTPATIENT
Start: 2025-03-05 | End: 2025-03-05

## 2025-03-05 RX ORDER — NAPROXEN 250 MG/1
500 TABLET ORAL
Status: COMPLETED | OUTPATIENT
Start: 2025-03-05 | End: 2025-03-05

## 2025-03-05 RX ORDER — ACETAMINOPHEN 500 MG
1000 TABLET ORAL
Status: COMPLETED | OUTPATIENT
Start: 2025-03-05 | End: 2025-03-05

## 2025-03-05 RX ADMIN — NAPROXEN 500 MG: 250 TABLET ORAL at 11:03

## 2025-03-05 RX ADMIN — ACETAMINOPHEN 1000 MG: 500 TABLET ORAL at 11:03

## 2025-03-05 RX ADMIN — TRAMADOL HYDROCHLORIDE 50 MG: 50 TABLET, COATED ORAL at 11:03

## 2025-03-05 RX ADMIN — CLONIDINE HYDROCHLORIDE 0.1 MG: 0.1 TABLET ORAL at 11:03

## 2025-03-05 RX ADMIN — GABAPENTIN 300 MG: 300 CAPSULE ORAL at 11:03

## 2025-03-05 NOTE — ED PROVIDER NOTES
"ED Provider Note - 3/4/2025    History     Chief Complaint   Patient presents with    Leg Pain     Pain to the right leg that radiates from the hip. PMHX sciatica.      Patient reports a chief complaint of back pain.  Onset was noted 2 weeks ago.  This is isolated to the lower back.  This pain does radiate down the lateral right thigh.  There has been no recent trauma.  There is no numbness, weakness, incontinence, or retention reported.  Patient has attempted treatment at home with Tylenol and ibuprofen following an injection of dexamethasone following an earlier encounter at our department yesterday.  Urinary complaints are denied.          Review of patient's allergies indicates:  No Known Allergies  Past Medical History:   Diagnosis Date    Diabetes mellitus, type 2     Hypertension      History reviewed. No pertinent surgical history.  No family history on file.  Social History[1]  Review of Systems   Constitutional:  Negative for chills and fever.   HENT:  Negative for congestion and rhinorrhea.    Respiratory:  Negative for cough and shortness of breath.    Cardiovascular:  Negative for chest pain and palpitations.   Gastrointestinal:  Negative for abdominal pain, diarrhea and vomiting.   Genitourinary:  Negative for difficulty urinating and dysuria.   Musculoskeletal:  Positive for back pain. Negative for neck pain.   Skin:  Negative for color change and rash.   Neurological:  Negative for dizziness and light-headedness.   Hematological:  Negative for adenopathy. Does not bruise/bleed easily.     Physical Exam     Initial Vitals [03/04/25 2152]   BP Pulse Resp Temp SpO2   (!) 243/109 82 18 97.9 °F (36.6 °C) 96 %      MAP       --         Vitals:    03/04/25 2152 03/04/25 2234 03/04/25 2300 03/04/25 2326   BP: (!) 243/109  (!) 227/105    Pulse: 82      Resp: 18 16  16   Temp: 97.9 °F (36.6 °C)      TempSrc: Oral      SpO2: 96%      Weight: 90.7 kg (200 lb)      Height: 5' 5" (1.651 m)       03/04/25 2358 "   BP: (!) 225/102   Pulse:    Resp:    Temp:    TempSrc:    SpO2:    Weight:    Height:      Physical Exam    Nursing note and vitals reviewed.  Constitutional: She appears well-developed and well-nourished. She is not diaphoretic. No distress.   HENT:   Head: Normocephalic and atraumatic.   Nose: Nose normal. Mouth/Throat: Oropharynx is clear and moist.   Eyes: Conjunctivae are normal. No scleral icterus.   Cardiovascular:  Normal rate, regular rhythm and intact distal pulses.           Pulmonary/Chest: No respiratory distress.   Musculoskeletal:         General: No edema. Normal range of motion.      Cervical back: Normal.      Thoracic back: Normal.      Lumbar back: No bony tenderness.        Back:      Neurological: She is alert and oriented to person, place, and time.   Skin: Skin is warm and dry.         ED Course   Procedures                   MDM  Differential Diagnoses   Based on available history, the working differential diagnoses considered during this evaluation include but are not limited to lumbar sprain, strain, spondylolisthesis, spondylolysis, spondylosis, sciatica, pathologic fracture, paraspinal infection, and urinary infection.      LABS     Labs Reviewed   BASIC METABOLIC PANEL - Abnormal       Result Value    Sodium 134 (*)     Potassium 3.9      Chloride 97      CO2 27      Glucose 310 (*)     BUN 23 (*)     Creatinine 1.01      Calcium 9.4      Anion Gap 10      eGFR >60.0             All available results from the labs ordered were independently reviewed. with findings as follows:  BNP notable for hyperglycemia with normal anion gap.     Imaging     Imaging Results              X-Ray Lumbar Spine Ap And Lateral (Final result)  Result time 03/04/25 23:01:54      Final result by Jimmy Guerrero MD (03/04/25 23:01:54)                   Impression:      No acute abnormality.      Electronically signed by: Jimmy Guerrero  Date:    03/04/2025  Time:    23:01               Narrative:     EXAMINATION:  XR LUMBAR SPINE AP AND LATERAL    CLINICAL HISTORY:  XR LUMBAR SPINE AP AND LATERAL    COMPARISON:  None    FINDINGS:  Multiple radiographic views  were obtained.    No evidence of acute fracture or dislocation.  Moderate multilevel degenerative disc disease.  Atherosclerotic changes of the aorta.                                             EKG        ED Management/Discussion     Medications   orphenadrine injection 30 mg (30 mg Intramuscular Given 3/4/25 2233)   morphine injection 2 mg (2 mg Intravenous Given 3/4/25 2234)   morphine injection 2 mg (2 mg Intramuscular Given 3/4/25 4926)                 The patient's list of active medical problems, social history, medications, and allergies as documented per RN staff has been reviewed.               On final assessment, the patient appears suitable for discharge.  Pain appears to be markedly improved following administration of medications.  Overall clinical picture consistent with that of sciatica.  Hyperglycemia noted on chemistry likely the result of recent steroid administration.  Blood pressure remains somewhat elevated though I see no indication of hypertensive emergency.  Patient notably has not taken her p.m. medication but intends to do so upon returning home.  Patient does indicate a history of consistent white coat syndrome.  Patient's pain is also likely playing a significant role in her elevated blood pressure reading.  We have encouraged home monitoring along with close outpatient follow-up for reassessment.    I see no indication of an emergent process beyond that addressed during our encounter but have duly counseled the patient/family regarding the need for prompt follow-up as well as the indications that should prompt immediate return to the emergency room.  The patient/family has been provided with language -specific verbal and printed direction regarding our final diagnosis(es) as well as instructions regarding use of OTC and/or Rx  medications intended to manage the patient's aforementioned conditions including:  ED Prescriptions       Medication Sig Dispense Start Date End Date Auth. Provider                      cyclobenzaprine (FLEXERIL) 10 MG tablet Take 1 tablet (10 mg total) by mouth 3 (three) times daily as needed for Muscle spasms. 15 tablet 3/5/2025 3/10/2025 Gilberto Choi MD    meloxicam (MOBIC) 7.5 MG tablet Take 1 tablet (7.5 mg total) by mouth once daily. for 7 days 7 tablet 3/5/2025 3/12/2025 Gilberto Choi MD              Patient has been advised of the following recommended follow-up instructions:  Follow-up Information       Follow up With Specialties Details Why Contact Info    Kelly Katz MD Family Medicine Schedule an appointment as soon as possible for a visit  for reassessment [address] [address] [phone]      [facility] - Emergency Dept Emergency Medicine Go to  As needed, If symptoms worsen [address] [facility]  Emer[facility]  [facility] [address] [phone]          The patient/family communicates understanding of all this information and all remaining questions and concerns were addressed at this time.      Referrals:  No orders of the defined types were placed in this encounter.      CLINICAL IMPRESSION    ICD-10-CM ICD-9-CM   1. Degeneration of intervertebral disc of lumbar region with discogenic back pain and lower extremity pain  M51.362 722.52   2. Poorly-controlled hypertension  I10 401.9          ED Disposition Condition    Discharge Stable                   [1]   Social History  Tobacco Use    Smoking status: Never    Smokeless tobacco: Never   Substance Use Topics    Alcohol use: No    Drug use: No        Gilberto Choi MD  03/05/25 5602

## 2025-03-06 VITALS
HEART RATE: 77 BPM | BODY MASS INDEX: 33.32 KG/M2 | TEMPERATURE: 98 F | WEIGHT: 200 LBS | RESPIRATION RATE: 16 BRPM | OXYGEN SATURATION: 96 % | SYSTOLIC BLOOD PRESSURE: 137 MMHG | HEIGHT: 65 IN | DIASTOLIC BLOOD PRESSURE: 69 MMHG

## 2025-03-06 NOTE — DISCHARGE INSTRUCTIONS
Follow-up with your primary care doctor by calling for appointment Both to discuss long-term management of sciatica as well as long-term blood pressure management.  Use medications as prescribed, and return to the emergency department if condition worsens in any way.  You can independently take all 3 medications, I recommend taking the naproxen instead of the meloxicam that your previously prescribed.  Do not drive or operate machinery or mix with alcohol when taking tramadol.

## 2025-03-06 NOTE — ED PROVIDER NOTES
Encounter Date: 3/5/2025       History     Chief Complaint   Patient presents with    Leg Pain       Pain to the right leg that radiates from the hip. PMHX sciatica.         HPI  69-year-old female history of hypertension, diabetes, presents on 3rd ED visit this week for pain that begins in the left buttock and radiates down the left leg.  Has been seen by to other ER doctors this week, given steroid shot, Toradol shot, and meloxicam and cyclobenzaprine for own without improvement.  Denies any other acute complaints, presents as symptoms are currently severe.  Review of patient's allergies indicates:  No Known Allergies  Past Medical History:   Diagnosis Date    Diabetes mellitus, type 2     Hypertension      History reviewed. No pertinent surgical history.  No family history on file.  Social History[1]  Medical surgical family and social history reviewed  Review of Systems  Complete review of systems was conducted and was negative except as per HPI and as marked  Physical Exam     Initial Vitals [03/05/25 2235]   BP Pulse Resp Temp SpO2   (!) 235/108 80 16 98 °F (36.7 °C) 95 %      MAP       --         Physical Exam  Physical  General: Awake, alert, no acute distress  Head: Normocephalic, atraumatic  Neck: Trachea midline, full range of motion, no meningismus  ENT: Atraumatic, Airway Patent  Cardio: Regular Rate, Regular Rhythm, Heart Sounds Normal, Capillary refill normal  Chest: Atraumatic, No respiratory distress no use of accessory muscles to breath, normal rate, sounds even and clear to auscultation  Abdomen: Soft, Nontender, no involuntary guarding, rigidity, or rebound.  Upper Ext: Atraumatic, Inspection normal, no swelling  Lower Ext: Atraumatic, Inspection normal, no swelling  Neuro: No gross cranial nerve abnormality, no lateralization, no gross sensory or motor deficits      Back has scant right-sided paraspinal muscular tenderness of the lumbar spine without midline tenderness.  There is pronounced  tenderness over the right sciatic notch.  No greater troch tenderness.  No ITB band tenderness.  Has able to ambulate independently with a normal stable gait.  ED Course   Procedures  Labs Reviewed - No data to display       Imaging Results    None          Medications   gabapentin capsule 300 mg (300 mg Oral Given 3/5/25 2315)   traMADoL tablet 50 mg (50 mg Oral Given 3/5/25 2314)   acetaminophen tablet 1,000 mg (1,000 mg Oral Given 3/5/25 2312)   cloNIDine tablet 0.1 mg (0.1 mg Oral Given 3/5/25 2314)   naproxen tablet 500 mg (500 mg Oral Given 3/5/25 2313)     Medical Decision Making  Risk  OTC drugs.  Prescription drug management.                Previous ED visits reviewed.     The exam is entirely consistent with sciatica, and frankly inconsistent with any sort of other invasive process requiring further labs or imaging, other than there may be some degree of other underlying lumbar radiculopathy but this will not  in any way at this time.  There is no evidence of cord compression, cauda equina, conus medullaris, or any other EMC requiring advanced imaging.  Therapies were escalated including starting on naproxen instead of meloxicam, as well as tramadol and gabapentin for nerve type pain.  Pain beginning to improve shortly after their administration.  Discussed need for ongoing multimodal therapy, and likely physical therapy follow up.  No other EMC by [facility].                   Clinical Impression:  Final diagnoses:  [M54.30] Sciatica, unspecified laterality (Primary)          ED Disposition Condition    Discharge           ED Prescriptions       Medication Sig Dispense Start Date End Date Auth. Provider    gabapentin (NEURONTIN) 300 MG capsule Take 1 capsule (300 mg total) by mouth 3 (three) times daily. for 14 days 42 capsule 3/5/2025 3/19/2025 Juaquin Membreno MD    traMADoL (ULTRAM) 50 mg tablet Take 1 tablet (50 mg total) by mouth every 8 (eight) hours as needed for Pain. 9 tablet 3/5/2025  3/8/2025 Juaquin Membreno MD    naproxen (NAPROSYN) 500 MG tablet Take 1 tablet (500 mg total) by mouth 2 (two) times daily. for 5 days 10 tablet 3/5/2025 3/10/2025 Juaquin Membreno MD          Follow-up Information       Follow up With Specialties Details Why Contact Info    Kelly Katz MD Family Medicine In 1 week  [address] [address]  [phone]                   [1]   Social History  Tobacco Use    Smoking status: Never    Smokeless tobacco: Never   Substance Use Topics    Alcohol use: No    Drug use: No        Juaquin Membreno MD  03/06/25 0458

## 2025-03-07 DIAGNOSIS — M54.9 BACK PAIN, UNSPECIFIED BACK LOCATION, UNSPECIFIED BACK PAIN LATERALITY, UNSPECIFIED CHRONICITY: Primary | ICD-10-CM

## 2025-03-07 RX ORDER — LIDOCAINE 50 MG/G
1 PATCH TOPICAL DAILY
Qty: 30 PATCH | Refills: 0 | Status: SHIPPED | OUTPATIENT
Start: 2025-03-07

## 2025-03-13 ENCOUNTER — OFFICE VISIT (OUTPATIENT)
Dept: FAMILY MEDICINE | Facility: HOSPITAL | Age: 70
End: 2025-03-13
Payer: OTHER GOVERNMENT

## 2025-03-13 VITALS
BODY MASS INDEX: 36.96 KG/M2 | SYSTOLIC BLOOD PRESSURE: 108 MMHG | DIASTOLIC BLOOD PRESSURE: 69 MMHG | HEIGHT: 65 IN | WEIGHT: 221.81 LBS | HEART RATE: 65 BPM

## 2025-03-13 DIAGNOSIS — E78.5 HYPERLIPIDEMIA, UNSPECIFIED HYPERLIPIDEMIA TYPE: ICD-10-CM

## 2025-03-13 DIAGNOSIS — E11.9 TYPE 2 DIABETES MELLITUS WITHOUT COMPLICATION, WITHOUT LONG-TERM CURRENT USE OF INSULIN: ICD-10-CM

## 2025-03-13 DIAGNOSIS — I10 ESSENTIAL HYPERTENSION: ICD-10-CM

## 2025-03-13 DIAGNOSIS — Z78.9 DECREASED ACTIVITIES OF DAILY LIVING (ADL): ICD-10-CM

## 2025-03-13 DIAGNOSIS — Z00.00 HEALTHCARE MAINTENANCE: ICD-10-CM

## 2025-03-13 DIAGNOSIS — E66.01 SEVERE OBESITY (BMI 35.0-39.9) WITH COMORBIDITY: ICD-10-CM

## 2025-03-13 DIAGNOSIS — I10 PRIMARY HYPERTENSION: ICD-10-CM

## 2025-03-13 DIAGNOSIS — M54.9 BACK PAIN, UNSPECIFIED BACK LOCATION, UNSPECIFIED BACK PAIN LATERALITY, UNSPECIFIED CHRONICITY: ICD-10-CM

## 2025-03-13 DIAGNOSIS — M54.30 SCIATICA, UNSPECIFIED LATERALITY: Primary | ICD-10-CM

## 2025-03-13 PROCEDURE — 99214 OFFICE O/P EST MOD 30 MIN: CPT | Performed by: PHYSICIAN ASSISTANT

## 2025-03-13 RX ORDER — NEBULIZER AND COMPRESSOR
1 EACH MISCELLANEOUS DAILY
Qty: 1 EACH | Refills: 0 | Status: SHIPPED | OUTPATIENT
Start: 2025-03-13

## 2025-03-13 RX ORDER — NAPROXEN 500 MG/1
500 TABLET ORAL 2 TIMES DAILY PRN
Qty: 30 TABLET | Refills: 1 | Status: SHIPPED | OUTPATIENT
Start: 2025-03-13

## 2025-03-13 RX ORDER — GABAPENTIN 300 MG/1
300 CAPSULE ORAL 3 TIMES DAILY
Qty: 90 CAPSULE | Refills: 1 | Status: SHIPPED | OUTPATIENT
Start: 2025-03-13 | End: 2025-05-12

## 2025-03-13 RX ORDER — LIDOCAINE 50 MG/G
1 PATCH TOPICAL DAILY
Qty: 30 PATCH | Refills: 3 | Status: SHIPPED | OUTPATIENT
Start: 2025-03-13

## 2025-03-13 RX ORDER — TRAMADOL HYDROCHLORIDE 50 MG/1
50 TABLET ORAL EVERY 8 HOURS PRN
Qty: 15 TABLET | Refills: 0 | Status: SHIPPED | OUTPATIENT
Start: 2025-03-13 | End: 2025-03-18

## 2025-03-13 RX ORDER — LANCETS
EACH MISCELLANEOUS
Qty: 200 EACH | Refills: 3 | Status: SHIPPED | OUTPATIENT
Start: 2025-03-13 | End: 2025-03-14 | Stop reason: SDUPTHER

## 2025-03-13 RX ORDER — INSULIN PUMP SYRINGE, 3 ML
EACH MISCELLANEOUS
Qty: 1 EACH | Refills: 0 | Status: SHIPPED | OUTPATIENT
Start: 2025-03-13 | End: 2025-03-14 | Stop reason: SDUPTHER

## 2025-03-13 RX ORDER — AMLODIPINE BESYLATE 5 MG/1
5 TABLET ORAL DAILY
Qty: 90 TABLET | Refills: 1 | Status: SHIPPED | OUTPATIENT
Start: 2025-03-13 | End: 2026-03-13

## 2025-03-13 NOTE — PROGRESS NOTES
FAMILY MEDICINE  New Visit Progress Note   Recent Hospital Discharge     PRESENTING HISTORY     Chief Complaint/Reason for Admission:  Follow up Hospital Discharge   Chief Complaint   Patient presents with    Follow-up     PCP: Kelly Katz MD    History of Present Illness:  Ms. Nam William is a 69 y.o. female with PMH of HTN, diabetes here today for ED follow up. Pt recently presented to the ED x3 for low back pain that radiates down her left buttock and leg. XR spine without acute process. Exam consistent with sciatica. She was treated with a steroid shot, Toradol shot, meloxicam, cyclobenzaprine, gabapentin, naproxen, and tramadol.     Today Ms. William is unaccompanied during the visit. Overall her pain has improved quite a bit. She is still taking the gabapentin as scheduled, but has just been using naproxen and tramadol PRN. She has 1 tramadol left (9 sent). Has been taking less than once daily. She has not yet scheduled PT, but they are reached out to her to set up her home care. No b/b dysfunction.     HTN: BP high today. Her home cuff is very apparently quite inaccurate (documented below). Taking medications as prescribed. No CP, SOB, HA, vision changes.     T2DM: A1c 11+. Taking metformin 1000mg BID.     Patient is also requesting a toilet seat with safety rails to help her get up, as well as something to help her put her socks on.     Review of Systems  General ROS: negative for chills, fever or weight loss  Psychological ROS: negative for hallucination, depression or suicidal ideation  Ophthalmic ROS: negative for blurry vision, photophobia or eye pain  ENT ROS: negative for epistaxis, sore throat or rhinorrhea  Respiratory ROS: no cough, shortness of breath, or wheezing  Cardiovascular ROS: no chest pain or dyspnea on exertion  Gastrointestinal ROS: no abdominal pain, change in bowel habits, or black/ bloody stools  Genito-Urinary ROS: no dysuria, trouble voiding, or hematuria  Musculoskeletal ROS:  "negative for gait disturbance or muscular weakness  Neurological ROS: no syncope or seizures; no ataxia  Dermatological ROS: negative for pruritis, rash and jaundice    PAST HISTORY:     Past Medical History:   Diagnosis Date    Diabetes mellitus, type 2     Hypertension        No past surgical history on file.    No family history on file.    Social History     Socioeconomic History    Marital status:    Tobacco Use    Smoking status: Never    Smokeless tobacco: Never   Substance and Sexual Activity    Alcohol use: No    Drug use: No       MEDICATIONS & ALLERGIES:     Medications Ordered Prior to Encounter[1]     Review of patient's allergies indicates:  No Known Allergies    OBJECTIVE:     Vital Signs:  Vitals:    03/13/25 0945 03/13/25 1017 03/13/25 1018   BP: (!) 183/103 (!) 178/111 108/69 (home cuff)   Patient Position: Sitting     Pulse: 85 81 65   Weight: 100.6 kg (221 lb 12.5 oz)     Height: 5' 5" (1.651 m)         Wt Readings from Last 3 Encounters:   03/13/25 0945 100.6 kg (221 lb 12.5 oz)   03/05/25 2235 90.7 kg (200 lb)   03/04/25 2152 90.7 kg (200 lb)     Body mass index is 36.91 kg/m².      Physical Exam:  General appearance: Alert, cooperative, no distress  Constitutional: Oriented to person, place, and time. Appears well-developed and well-nourished.  HEENT: Normocephalic, atraumatic, neck symmetrical, no nasal discharge   Eyes: Conjunctivae/corneas clear, EOM's intact  Extremities: extremities symmetric; no edema  Integument: Skin color, texture, turgor normal  Neurologic: Alert and oriented X 3, normal strength, normal coordination and gait  Psychiatric: no pressured speech; normal affect; no evidence of impaired cognition     Laboratory  Lab Results   Component Value Date    WBC 6.12 08/08/2024    HGB 15.8 08/08/2024    HCT 47.5 08/08/2024    MCV 85 08/08/2024     08/08/2024     BMP  Lab Results   Component Value Date     (L) 03/04/2025    K 3.9 03/04/2025    CL 97 03/04/2025 " "   CO2 27 03/04/2025    BUN 23 (H) 03/04/2025    CREATININE 1.01 03/04/2025    CALCIUM 9.4 03/04/2025    ANIONGAP 10 03/04/2025    EGFRNORACEVR >60.0 03/04/2025     Lab Results   Component Value Date    ALT 16 08/08/2024    AST 17 08/08/2024    ALKPHOS 92 08/08/2024    BILITOT 0.5 08/08/2024     No results found for: "INR", "PROTIME"  Lab Results   Component Value Date    HGBA1C 11.3 (H) 08/08/2024     No results for input(s): "POCTGLUCOSE" in the last 72 hours.    ASSESSMENT & PLAN:     Sciatica, unspecified laterality   -Will continue all medications for another couple weeks. Advised to only use tramadol and naproxen PRN. Can continue scheduled gabapentin. Will call to set up PT. Continue ice/heat, home stretching.   -     gabapentin (NEURONTIN) 300 MG capsule; Take 1 capsule (300 mg total) by mouth 3 (three) times daily.  Dispense: 90 capsule; Refill: 1  -     naproxen (NAPROSYN) 500 MG tablet; Take 1 tablet (500 mg total) by mouth 2 (two) times daily as needed (low back pain).  Dispense: 30 tablet; Refill: 1  -     traMADoL (ULTRAM) 50 mg tablet; Take 1 tablet (50 mg total) by mouth every 8 (eight) hours as needed for Pain.  Dispense: 15 tablet; Refill: 0    Back pain, unspecified back location, unspecified back pain laterality, unspecified chronicity  -     LIDOcaine (LIDODERM) 5 %; Place 1 patch onto the skin once daily. Remove & Discard patch within 12 hours or as directed by MD  Dispense: 30 patch; Refill: 3    Hyperlipidemia, unspecified hyperlipidemia type   -Taking statin, but not increase dose to 80mg as prescribed. Taking 40mg.   -     Lipid Panel; Future; Expected date: 03/13/2025    Type 2 diabetes mellitus without complication, without long-term current use of insulin   -A1c uncontrolled. Will likely need additional diabetes medication. Will wait to see current A1c. GLP1 may a good option.   -     Hemoglobin A1C; Future; Expected date: 03/13/2025  -     Comprehensive Metabolic Panel; Future; Expected " date: 03/13/2025  -     Microalbumin/creatinine urine ratio  -     lancets (LANCETS,THIN) Misc; Use to test BG twice daily.  Dispense: 200 each; Refill: 3  -     blood sugar diagnostic Strp; To check BG 4 times daily, to use with insurance preferred meter  Dispense: 200 each; Refill: 0  -     blood-glucose meter kit; To check BG 4 times daily, to use with insurance preferred meter  Dispense: 1 each; Refill: 0    Severe obesity (BMI 35.0-39.9) with comorbidity    Essential hypertension  -     BLOOD PRESSURE CUFF Misc; 1 kit by Misc.(Non-Drug; Combo Route) route once daily.  Dispense: 1 each; Refill: 0    Healthcare maintenance  -     CBC Auto Differential; Future; Expected date: 03/13/2025    Primary hypertension   -BP quite elevated. Will add amlodipine to her ACEi, diuretic, and BB. Would plan to send combo pill once dosages are titrated.   -     amLODIPine (NORVASC) 5 MG tablet; Take 1 tablet (5 mg total) by mouth once daily.  Dispense: 90 tablet; Refill: 1    Decreased activities of daily living (ADL)  -     miscellaneous medical supply Kit; Toilet seat with safety rails  Dispense: 1 kit; Refill: 0      Scheduled Follow-up :  Future Appointments   Date Time Provider Department Center   4/24/2025  1:40 PM Kelly Katz MD [facility]       Post Visit Medication List:     Medication List            Accurate as of March 13, 2025 11:20 AM. If you have any questions, ask your nurse or doctor.                START taking these medications      amLODIPine 5 MG tablet  Commonly known as: NORVASC  Take 1 tablet (5 mg total) by mouth once daily.  Started by: Albaro Weiss PA-C     lancets Misc  Commonly known as: LANCETS,THIN  Use to test BG twice daily.  Started by: Albaro Weiss PA-C            CHANGE how you take these medications      * BLOOD PRESSURE CUFF Misc  Generic drug: miscellaneous medical supply  1 kit by Misc.(Non-Drug; Combo Route) route once daily.  What changed: Another medication with the  same name was added. Make sure you understand how and when to take each.  Changed by: Albaro Weiss PA-C     * miscellaneous medical supply Kit  Toilet seat with safety rails  What changed: You were already taking a medication with the same name, and this prescription was added. Make sure you understand how and when to take each.  Changed by: Albaro Weiss PA-C     naproxen 500 MG tablet  Commonly known as: NAPROSYN  Take 1 tablet (500 mg total) by mouth 2 (two) times daily as needed (low back pain).  What changed:   when to take this  reasons to take this  Changed by: Albaro Weiss PA-C           * This list has 2 medication(s) that are the same as other medications prescribed for you. Read the directions carefully, and ask your doctor or other care provider to review them with you.                CONTINUE taking these medications      atorvastatin 80 MG tablet  Commonly known as: LIPITOR  Take 1 tablet (80 mg total) by mouth once daily.     blood sugar diagnostic Strp  To check BG 4 times daily, to use with insurance preferred meter     blood-glucose meter kit  To check BG 4 times daily, to use with insurance preferred meter     gabapentin 300 MG capsule  Commonly known as: NEURONTIN  Take 1 capsule (300 mg total) by mouth 3 (three) times daily.     hydroCHLOROthiazide 25 MG tablet  Commonly known as: HYDRODIURIL  Take 1 tablet (25 mg total) by mouth once daily.     LIDOcaine 5 %  Commonly known as: LIDODERM  Place 1 patch onto the skin once daily. Remove & Discard patch within 12 hours or as directed by MD     lisinopriL 40 MG tablet  Commonly known as: PRINIVIL,ZESTRIL  Take 1 tablet (40 mg total) by mouth once daily.     metFORMIN 1000 MG tablet  Commonly known as: GLUCOPHAGE  Take 1 tablet (1,000 mg total) by mouth 2 (two) times daily.     metoprolol tartrate 50 MG tablet  Commonly known as: LOPRESSOR  Take 1 tablet (50 mg total) by mouth 2 (two) times daily.     traMADoL 50 mg tablet  Commonly known as:  ULTRAM  Take 1 tablet (50 mg total) by mouth every 8 (eight) hours as needed for Pain.            STOP taking these medications      FREESTYLE MAGDIEL 3 SENSOR Edith  Generic drug: blood-glucose sensor  Stopped by: Albaro Weiss PA-C     ibuprofen 800 MG tablet  Commonly known as: ADVIL,MOTRIN  Stopped by: Albaro Weiss PA-C               Where to Get Your Medications        These medications were sent to [facility] [place] [facility] [address]  [address] [place] [address]      Phone: [phone]   BLOOD PRESSURE CUFF Misc  blood sugar diagnostic Strp  blood-glucose meter kit  lancets Misc  miscellaneous medical supply Kit       These medications were sent to [facility] - [facility] [address] [facility]  [address] [facility] [address]      Phone: [phone]   amLODIPine 5 MG tablet  gabapentin 300 MG capsule  LIDOcaine 5 %  naproxen 500 MG tablet  traMADoL 50 mg tablet         Signing Provider:  Albaro Weiss PA-C           [1]   Current Outpatient Medications on File Prior to Visit   Medication Sig Dispense Refill    atorvastatin (LIPITOR) 80 MG tablet Take 1 tablet (80 mg total) by mouth once daily. 90 tablet 3    hydroCHLOROthiazide (HYDRODIURIL) 25 MG tablet Take 1 tablet (25 mg total) by mouth once daily. 90 tablet 3    lisinopriL (PRINIVIL,ZESTRIL) 40 MG tablet Take 1 tablet (40 mg total) by mouth once daily. 90 tablet 3    metFORMIN (GLUCOPHAGE) 1000 MG tablet Take 1 tablet (1,000 mg total) by mouth 2 (two) times daily. 180 tablet 3    metoprolol tartrate (LOPRESSOR) 50 MG tablet Take 1 tablet (50 mg total) by mouth 2 (two) times daily. 180 tablet 3    [DISCONTINUED] gabapentin (NEURONTIN) 300 MG capsule Take 1 capsule (300 mg total) by mouth 3 (three) times daily. for 14 days 42 capsule 0    [DISCONTINUED] ibuprofen (ADVIL,MOTRIN) 800 MG tablet Take 1 tablet (800 mg total) by mouth daily as needed. 90 tablet 3    [DISCONTINUED] LIDOcaine (LIDODERM) 5 % Place 1  patch onto the skin once daily. Remove & Discard patch within 12 hours or as directed by MD 30 patch 0    [] meloxicam (MOBIC) 7.5 MG tablet Take 1 tablet (7.5 mg total) by mouth once daily. for 7 days 7 tablet 0    [DISCONTINUED] BLOOD PRESSURE CUFF Misc 1 kit by Misc.(Non-Drug; Combo Route) route once daily. (Patient not taking: Reported on 3/13/2025)  0    [DISCONTINUED] blood sugar diagnostic Strp To check BG 4 times daily, to use with insurance preferred meter (Patient not taking: Reported on 3/13/2025) 200 each 0    [DISCONTINUED] blood-glucose meter kit To check BG 4 times daily, to use with insurance preferred meter (Patient not taking: Reported on 2023) 1 each 0    [DISCONTINUED] blood-glucose sensor (FREESTYLE MAGDIEL 3 SENSOR) Edith 1 each by Misc.(Non-Drug; Combo Route) route once daily. (Patient not taking: Reported on 3/13/2025) 5 each 3    [DISCONTINUED] naproxen (NAPROSYN) 500 MG tablet Take 1 tablet (500 mg total) by mouth 2 (two) times daily. for 5 days 10 tablet 0    [DISCONTINUED] traMADoL (ULTRAM) 50 mg tablet Take 1 tablet (50 mg total) by mouth every 8 (eight) hours as needed for Pain. 9 tablet 0     No current facility-administered medications on file prior to visit.

## 2025-03-14 ENCOUNTER — TELEPHONE (OUTPATIENT)
Dept: FAMILY MEDICINE | Facility: HOSPITAL | Age: 70
End: 2025-03-14
Payer: OTHER GOVERNMENT

## 2025-03-14 DIAGNOSIS — E11.9 TYPE 2 DIABETES MELLITUS WITHOUT COMPLICATION, WITHOUT LONG-TERM CURRENT USE OF INSULIN: Primary | ICD-10-CM

## 2025-03-14 RX ORDER — LANCETS
EACH MISCELLANEOUS
Qty: 200 EACH | Refills: 3 | Status: SHIPPED | OUTPATIENT
Start: 2025-03-14

## 2025-03-14 RX ORDER — INSULIN PUMP SYRINGE, 3 ML
EACH MISCELLANEOUS
Qty: 1 EACH | Refills: 0 | Status: SHIPPED | OUTPATIENT
Start: 2025-03-14 | End: 2026-03-14

## 2025-03-14 NOTE — TELEPHONE ENCOUNTER
Returned patients phone call. No answer left a voicemail. Medication was sent to pharmacy yesterday. Advised patient to call pharmacy and check on status.         ----- Message from Raúl sent at 3/14/2025  8:28 AM CDT -----  .Type:  RX Refill RequestWho Called: ptRefill or New Rx:refillRX Name and Strength:amLODIPine (NORVASC) 5 MG tabletHow is the patient currently taking it? (ex. 1XDay): Take 1 tablet (5 mg total) by mouth once daily. - OralIs this a 30 day or 90 day RX:90 tabletPreferred Pharmacy with phone number:[facility] [place] [place] [facility] [address] [facility] [facility]:mail Ordering Provider:Rishi the patient rather a call back or a response via [facility]? [facility] Call Back Number:[phone] Information:  Refill or New Rx:refillRX Name and Strength:gabapentin (NEURONTIN) 300 MG capsuleHow is the patient currently taking it? (ex. 1XDay): Take 1 capsule (300 mg total) by mouth 3 (three) times daily. - OralIs this a 30 day or 90 day RX:90 capsuleRefill or New Rx:refillRX Name and Strength:traMADoL (ULTRAM) 50 mg tabletHow is the patient currently taking it? (ex. 1XDay):Take 1 tablet (50 mg total) by mouth every 8 (eight) hours as needed for Pain. - OralIs this a 30 day or 90 day RX:15 tabletRefill or New Rx:refillRX Name and Strength:naproxen (NAPROSYN) 500 MG tabletHow is the patient currently taking it? (ex. 1XDay): Take 1 tablet (500 mg total) by mouth 2 (two) times daily as needed (low back pain). - OralIs this a 30 day or 90 day RX:30 tabletRefill or New Rx:refillRX Name and Strength:LIDOcaine (LIDODERM) 5 %How is the patient currently taking it? (ex. 1XDay):Place 1 patch onto the skin once daily. Remove & Discard patch within 12 hours or as directed by MD - TransdermalIs this a 30 day or 90 day RX:30 patchPt stated for her glucose meter she would need a prescription and send to [facility] - [place] [facility] [place] - [address] and will also need a  diagnostic code b/c her medicare insurance will pay for it

## 2025-03-19 ENCOUNTER — HOSPITAL ENCOUNTER (EMERGENCY)
Facility: HOSPITAL | Age: 70
Discharge: HOME | End: 2025-03-19
Attending: EMERGENCY MEDICINE
Payer: OTHER GOVERNMENT

## 2025-03-19 VITALS
WEIGHT: 200 LBS | HEART RATE: 119 BPM | HEIGHT: 65 IN | OXYGEN SATURATION: 97 % | RESPIRATION RATE: 18 BRPM | DIASTOLIC BLOOD PRESSURE: 90 MMHG | SYSTOLIC BLOOD PRESSURE: 143 MMHG | TEMPERATURE: 98 F | BODY MASS INDEX: 33.32 KG/M2

## 2025-03-19 DIAGNOSIS — S39.012A STRAIN OF LUMBAR REGION, INITIAL ENCOUNTER: ICD-10-CM
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
## 2025-03-19 DIAGNOSIS — S93.401A SPRAIN OF RIGHT ANKLE, UNSPECIFIED LIGAMENT, INITIAL ENCOUNTER: ICD-10-CM

## 2025-03-19 DIAGNOSIS — M54.16 LUMBAR RADICULOPATHY: ICD-10-CM

## 2025-03-19 PROCEDURE — 63600175 PHARM REV CODE 636 W HCPCS: Mod: JZ,TB,ER | Performed by: PHYSICIAN ASSISTANT

## 2025-03-19 PROCEDURE — 96372 THER/PROPH/DIAG INJ SC/IM: CPT | Performed by: PHYSICIAN ASSISTANT

## 2025-03-19 PROCEDURE — 99284 EMERGENCY DEPT VISIT MOD MDM: CPT | Mod: 25,ER

## 2025-03-19 RX ORDER — KETOROLAC TROMETHAMINE 30 MG/ML
15 INJECTION, SOLUTION INTRAMUSCULAR; INTRAVENOUS
Status: COMPLETED | OUTPATIENT
Start: 2025-03-19 | End: 2025-03-19

## 2025-03-19 RX ORDER — NAPROXEN 500 MG/1
500 TABLET ORAL 2 TIMES DAILY WITH MEALS
Qty: 30 TABLET | Refills: 0 | Status: SHIPPED | OUTPATIENT
Start: 2025-03-19

## 2025-03-19 RX ORDER — DICLOFENAC SODIUM 10 MG/G
2 GEL TOPICAL DAILY
Qty: 200 G | Refills: 0 | Status: SHIPPED | OUTPATIENT
Start: 2025-03-19

## 2025-03-19 RX ORDER — LIDOCAINE 50 MG/G
1 PATCH TOPICAL DAILY
Qty: 5 PATCH | Refills: 0 | Status: SHIPPED | OUTPATIENT
Start: 2025-03-19

## 2025-03-19 RX ADMIN — KETOROLAC TROMETHAMINE 15 MG: 30 INJECTION, SOLUTION INTRAMUSCULAR; INTRAVENOUS at 02:03

## 2025-03-19 NOTE — ED PROVIDER NOTES
"Encounter Date: 3/19/2025       History     Chief Complaint   Patient presents with    Fall     Pt slipped and fell while on the way to an appointment. Pt states since then she has been having pain in the middle of her back and down her right leg.  Pt did not hit her had and did not lose consciousness.      69-year-old female presents to ED with concern exacerbation of her lower back pain after mechanical fall that occurred just prior to arrival.  Patient reports throughout the month she has been having sciatica pain to her lower back that radiates to right lower extremity, occasionally causing her right leg to "give out".  While attempting to get into vehicle she reports her leg gave out, causing her to fall directly onto buttock.  No head injury or LOC.  She was assisted back to standing position but continues have pain throughout her lower back and right ankle that is sharp, worse with weight-bearing or movement, severity 4/10.  No numbness or focal weakness, incontinence, fevers or chills.  No other acute complaints at this time    The history is provided by the patient.     Review of patient's allergies indicates:  No Known Allergies  Past Medical History:   Diagnosis Date    Diabetes mellitus, type 2     Hypertension      History reviewed. No pertinent surgical history.  No family history on file.  Social History[1]  Review of Systems   Constitutional:  Negative for chills and fever.   Cardiovascular:  Negative for chest pain.   Gastrointestinal:  Negative for abdominal pain, nausea and vomiting.   Genitourinary:  Negative for dysuria.   Musculoskeletal:  Positive for arthralgias and back pain. Negative for neck pain and neck stiffness.   Neurological:  Negative for weakness and numbness.       Physical Exam     Initial Vitals [03/19/25 1353]   BP Pulse Resp Temp SpO2   (!) 143/90 (!) 119 18 98.1 °F (36.7 °C) 97 %      MAP       --         Physical Exam    Vitals reviewed.  Constitutional: She appears " well-developed and well-nourished. She is active. She does not have a sickly appearance. She does not appear ill. No distress.   HENT:   Head: Normocephalic and atraumatic.   Neck:   Normal range of motion.  Pulmonary/Chest: Effort normal.   Musculoskeletal:      Cervical back: Normal range of motion.      Comments: Midline tenderness noted near inferior thoracic/superior lumbar region.  No bony palpable step-offs.  Tenderness does extend to right-sided lumbar paraspinal muscle and right gluteal region.  Right ankle tenderness with localized swelling over lateral malleolus.  No tenderness to right foot.  BLE sensations intact.  DP pulse intact.     Neurological: She is alert. GCS eye subscore is 4. GCS verbal subscore is 5. GCS motor subscore is 6.   Skin: Skin is warm and dry.   Psychiatric: She has a normal mood and affect. Her speech is normal and behavior is normal.         ED Course   Procedures  Labs Reviewed - No data to display       Imaging Results              CT Lumbar Spine Without Contrast (Final result)  Result time 03/19/25 14:57:27      Final result by Aruna NelsonSantiago), MD (03/19/25 14:57:27)                   Impression:      No fractures or dislocations.  Moderate central canal stenosis at L4-5.  Severe bilateral foraminal stenosis at L5-S1.    All CT scans at this facility are performed  using dose modulation techniques as appropriate to performed exam including the following:  automated exposure control; adjustment of mA and/or kV according to the patients size (this includes techniques or standardized protocols for targeted exams where dose is matched to indication/reason for exam: i.e. extremities or head);  iterative reconstruction technique.      Electronically signed by: Aruna Nelson MD  Date:    03/19/2025  Time:    14:57               Narrative:    EXAMINATION:  CT LUMBAR SPINE WITHOUT CONTRAST    CLINICAL HISTORY:  Low back pain, trauma;Ataxia, lumbar  trauma;    TECHNIQUE:  Standard noncontrast CT scan of the lumbar spine.    COMPARISON:  None    FINDINGS:  Alignment is grossly normal.  No fractures    T12-L1: Normal    L1-2: Normal    L2-3: Normal    L3-4: Normal    L4-5: Moderate circumferential disc bulge.  Moderate bilateral facet arthropathy.  Moderate central canal stenosis.    L5-S1: Vacuum discs mild posterior disc bulge.  Mild to moderate bilateral facet arthropathy.  Severe bilateral foraminal stenosis.                                       X-Ray Ankle Complete Right (Final result)  Result time 03/19/25 14:52:22      Final result by Aruna NelsonSantiagoMD valerie (03/19/25 14:52:22)                   Impression:      No acute fracture or dislocation      Electronically signed by: Aruna Nelson MD  Date:    03/19/2025  Time:    14:52               Narrative:    EXAMINATION:  XR ANKLE COMPLETE 3 VIEW RIGHT    CLINICAL HISTORY:  Right ankle pain    TECHNIQUE:  Standard radiography performed.  Three views.    COMPARISON:  None    FINDINGS:  Soft tissue swelling.  No acute fracture or dislocation.                                       Medications   ketorolac injection 15 mg (15 mg Intramuscular Given 3/19/25 1422)     Medical Decision Making  Patient presents with concern of injuries after fall that occurred just prior to arrival.  Afebrile.  Patient in no distress on exam    DDx:  Including but not limited to strain, sprain, contusing, dislocation, fracture, fall    Amount and/or Complexity of Data Reviewed  Radiology: ordered. Decision-making details documented in ED Course.    Risk  Prescription drug management.               ED Course as of 03/19/25 1527   Wed Mar 19, 2025   1521 CT Lumbar Spine Without Contrast  Per Radiology review:    No fractures or dislocations.  Moderate central canal stenosis at L4-5.  Severe bilateral foraminal stenosis at L5-S1.    [KS]   1521 X-Ray Ankle Complete Right  No acute fracture or dislocation per my interpretation  and Radiology review [KS]   1521 Results were discussed with patient.  She was able to ambulate in ED.  Stable for discharge with continued supportive care.  Prescriptions as written below.  I will refer her to spinal specialist for further assessment and management of her back pain.  Encouraged activities and movements as tolerated with high fall precautions.  ED return precautions were discussed.  Patient states understanding and agrees with plan [KS]      ED Course User Index  [KS] Jeffery Estrada PA-C                           Clinical Impression:  Final diagnoses:  [W19.XXXA] Fall (Primary)  [S39.012A] Strain of lumbar region, initial encounter  [S93.401A] Sprain of right ankle, unspecified ligament, initial encounter  [M54.16] Lumbar radiculopathy          ED Disposition Condition    Discharge Stable          ED Prescriptions       Medication Sig Dispense Start Date End Date Auth. Provider    diclofenac sodium (VOLTAREN ARTHRITIS PAIN) 1 % Gel Apply 2 g topically once daily. 200 g 3/19/2025 -- Jeffery Estrada PA-C    naproxen (NAPROSYN) 500 MG tablet Take 1 tablet (500 mg total) by mouth 2 (two) times daily with meals. 30 tablet 3/19/2025 -- Jeffery Estrada PA-C    LIDOcaine (LIDODERM) 5 % Place 1 patch onto the skin once daily. Remove & Discard patch within 12 hours or as directed by MD 5 patch 3/19/2025 -- Jeffery Estrada PA-C          Follow-up Information       Follow up With Specialties Details Why Contact Info    Kelly Katz MD Family Medicine   [address] [address]  [phone]                 [1]   Social History  Tobacco Use    Smoking status: Never    Smokeless tobacco: Never   Substance Use Topics    Alcohol use: No    Drug use: No        Jeffery Estrada PA-C  03/19/25 [id number]

## 2025-03-19 NOTE — DISCHARGE INSTRUCTIONS

## 2025-04-14 ENCOUNTER — OFFICE VISIT (OUTPATIENT)
Dept: ORTHOPEDICS | Facility: CLINIC | Age: 70
End: 2025-04-14
Payer: OTHER GOVERNMENT

## 2025-04-14 VITALS — WEIGHT: 198.44 LBS | BODY MASS INDEX: 33.06 KG/M2 | HEIGHT: 65 IN

## 2025-04-14 DIAGNOSIS — M54.16 LUMBAR RADICULOPATHY: ICD-10-CM

## 2025-04-14 DIAGNOSIS — M54.16 LUMBAR RADICULOPATHY, CHRONIC: Primary | ICD-10-CM

## 2025-04-14 PROCEDURE — 99999 PR PBB SHADOW E&M-EST. PATIENT-LVL III: CPT | Mod: PBBFAC,,, | Performed by: PHYSICIAN ASSISTANT

## 2025-04-14 PROCEDURE — 99213 OFFICE O/P EST LOW 20 MIN: CPT | Mod: PBBFAC | Performed by: PHYSICIAN ASSISTANT

## 2025-04-14 PROCEDURE — 99204 OFFICE O/P NEW MOD 45 MIN: CPT | Mod: S$PBB,,, | Performed by: PHYSICIAN ASSISTANT

## 2025-04-14 NOTE — PROGRESS NOTES
"DATE: 4/14/2025  PATIENT: Nam William    Supervising Physician: Boston Nguyen M.D.    CHIEF COMPLAINT: back and right leg pain    HISTORY:  Nam William is a 69 y.o. female here for initial evaluation of low back and right leg pain (Back - 1, Leg - 1).  The pain has been present for about 2 months. The patient describes the pain as aching.  The pain is worse with sitting and in the morning and improved by walking. There is associated numbness and tingling. There is subjective weakness. Prior treatments have included gabapentin, naproxen, tramadol and physical therapy, but no ESIs or surgery.    The patient denies myelopathic symptoms such as handwriting changes or difficulty with buttons/coins/keys. Denies perineal paresthesias, bowel/bladder dysfunction.    PAST MEDICAL/SURGICAL HISTORY:  Past Medical History:   Diagnosis Date    Diabetes mellitus, type 2     Hypertension      No past surgical history on file.    Medications:   Medications Ordered Prior to Encounter[1]    Social History: Social History[2]    REVIEW OF SYSTEMS:  Constitution: Negative. Negative for chills, fever and night sweats.   Cardiovascular: Negative for chest pain and syncope.   Respiratory: Negative for cough and shortness of breath.   Gastrointestinal: See HPI. Negative for nausea/vomiting. Negative for abdominal pain.  Genitourinary: See HPI. Negative for discoloration or dysuria.  Skin: Negative for dry skin, itching and rash.   Hematologic/Lymphatic: Negative for bleeding problem. Does not bruise/bleed easily.   Musculoskeletal: Negative for falls and muscle weakness.   Neurological: See HPI. No seizures.   Endocrine: Negative for polydipsia, polyphagia and polyuria.   Allergic/Immunologic: Negative for hives and persistent infections.     EXAM:  Ht 5' 5" (1.651 m)   Wt 90 kg (198 lb 6.6 oz)   LMP  (LMP Unknown)   BMI 33.02 kg/m²     General: The patient is a very pleasant 69 y.o. female in no apparent distress, the patient is " oriented to person, place and time.  Psych: Normal mood and affect  HEENT: Vision grossly intact, hearing intact to the spoken word.  Lungs: Respirations unlabored.  Gait: Antalgic station and gait.   Skin: Dorsal lumbar skin negative for rashes, lesions, hairy patches and surgical scars. There is mild lumbar tenderness to palpation.  Range of motion: Lumbar range of motion is acceptable.  Spinal Balance: Global saggital and coronal spinal balance acceptable, not significant for scoliosis and kyphosis.  Musculoskeletal: No pain with the range of motion of the bilateral hips. No trochanteric tenderness to palpation.  Vascular: Bilateral lower extremities warm and well perfused, dorsalis pedis pulses 2+ bilaterally.  Neurological: Normal strength and tone in all major motor groups in the bilateral lower extremities. Normal sensation to light touch in the L2-S1 dermatomes bilaterally.  Deep tendon reflexes symmetric 2+ in the bilateral lower extremities.  Negative Babinski bilaterally. Straight leg raise negative bilaterally.    IMAGING:      Today I personally reviewed AP and Lat upright L-spine films that demonstrate L5/S1 disc space narrowing.    CT lumbar spine was reviewed.  No fractures or dislocations.         Body mass index is 33.02 kg/m².    Hemoglobin A1C   Date Value Ref Range Status   03/13/2025 11.4 (H) 4.0 - 5.6 % Final     Comment:     ADA Screening Guidelines:  5.7-6.4%  Consistent with prediabetes  >or=6.5%  Consistent with diabetes    High levels of fetal hemoglobin interfere with the HbA1C  assay. Heterozygous hemoglobin variants (HbS, HgC, etc)do  not significantly interfere with this assay.   However, presence of multiple variants may affect accuracy.     08/08/2024 11.3 (H) 4.0 - 5.6 % Final     Comment:     ADA Screening Guidelines:  5.7-6.4%  Consistent with prediabetes  >or=6.5%  Consistent with diabetes    High levels of fetal hemoglobin interfere with the HbA1C  assay. Heterozygous  hemoglobin variants (HbS, HgC, etc)do  not significantly interfere with this assay.   However, presence of multiple variants may affect accuracy.     08/10/2023 10.5 (H) 4.0 - 5.6 % Final     Comment:     ADA Screening Guidelines:  5.7-6.4%  Consistent with prediabetes  >or=6.5%  Consistent with diabetes    High levels of fetal hemoglobin interfere with the HbA1C  assay. Heterozygous hemoglobin variants (HbS, HgC, etc)do  not significantly interfere with this assay.   However, presence of multiple variants may affect accuracy.             ASSESSMENT/PLAN:    Diagnoses and all orders for this visit:    Lumbar radiculopathy, chronic  -     MRI Lumbar Spine Without Contrast; Future    Lumbar radiculopathy  -     Ambulatory referral/consult to Spine Surgery        Today we discussed at length all of the different treatment options including anti-inflammatories, acetaminophen, rest, ice, heat, physical therapy including strengthening and stretching exercises, home exercises, ROM, aerobic conditioning, aqua therapy, other modalities including ultrasound, massage, and dry needling, epidural steroid injections and finally surgical intervention.      The patient has failed conservative treatment including medications and physical therapy. I will get an MRI. I will call with results.            [1]   Current Outpatient Medications on File Prior to Visit   Medication Sig Dispense Refill    amLODIPine (NORVASC) 5 MG tablet Take 1 tablet (5 mg total) by mouth once daily. 90 tablet 1    atorvastatin (LIPITOR) 80 MG tablet Take 1 tablet (80 mg total) by mouth once daily. 90 tablet 3    BLOOD PRESSURE CUFF Misc 1 kit by Misc.(Non-Drug; Combo Route) route once daily. 1 each 0    blood sugar diagnostic Strp To check BG 4 times daily, to use with insurance preferred meter 200 each 0    blood-glucose meter kit To check BG 4 times daily, to use with insurance preferred meter 1 each 0    diclofenac sodium (VOLTAREN ARTHRITIS PAIN) 1 %  Gel Apply 2 g topically once daily. 200 g 0    gabapentin (NEURONTIN) 300 MG capsule Take 1 capsule (300 mg total) by mouth 3 (three) times daily. 90 capsule 1    hydroCHLOROthiazide (HYDRODIURIL) 25 MG tablet Take 1 tablet (25 mg total) by mouth once daily. 90 tablet 3    lancets (LANCETS,THIN) Misc Use to test BG twice daily. 200 each 3    LIDOcaine (LIDODERM) 5 % Place 1 patch onto the skin once daily. Remove & Discard patch within 12 hours or as directed by MD 5 patch 0    lisinopriL (PRINIVIL,ZESTRIL) 40 MG tablet Take 1 tablet (40 mg total) by mouth once daily. 90 tablet 3    metFORMIN (GLUCOPHAGE) 1000 MG tablet Take 1 tablet (1,000 mg total) by mouth 2 (two) times daily. 180 tablet 3    metoprolol tartrate (LOPRESSOR) 50 MG tablet Take 1 tablet (50 mg total) by mouth 2 (two) times daily. 180 tablet 3    miscellaneous medical supply Kit Toilet seat with safety rails 1 kit 0    naproxen (NAPROSYN) 500 MG tablet Take 1 tablet (500 mg total) by mouth 2 (two) times daily with meals. 30 tablet 0     No current facility-administered medications on file prior to visit.   [2]   Social History  Socioeconomic History    Marital status:    Tobacco Use    Smoking status: Never    Smokeless tobacco: Never   Substance and Sexual Activity    Alcohol use: No    Drug use: No

## 2025-04-16 ENCOUNTER — HOSPITAL ENCOUNTER (OUTPATIENT)
Dept: RADIOLOGY | Facility: HOSPITAL | Age: 70
Discharge: HOME | End: 2025-04-16
Attending: PHYSICIAN ASSISTANT
Payer: OTHER GOVERNMENT

## 2025-04-16 DIAGNOSIS — M54.16 LUMBAR RADICULOPATHY, CHRONIC: ICD-10-CM

## 2025-04-16 PROCEDURE — 72148 MRI LUMBAR SPINE W/O DYE: CPT | Mod: 26,,, | Performed by: RADIOLOGY

## 2025-04-16 PROCEDURE — 72148 MRI LUMBAR SPINE W/O DYE: CPT | Mod: TC,PN

## 2025-04-21 ENCOUNTER — TELEPHONE (OUTPATIENT)
Dept: ORTHOPEDICS | Facility: CLINIC | Age: 70
End: 2025-04-21
Payer: OTHER GOVERNMENT

## 2025-04-21 ENCOUNTER — OFFICE VISIT (OUTPATIENT)
Dept: ORTHOPEDICS | Facility: CLINIC | Age: 70
End: 2025-04-21
Payer: OTHER GOVERNMENT

## 2025-04-21 DIAGNOSIS — M54.16 LUMBAR RADICULOPATHY: Primary | ICD-10-CM

## 2025-04-21 PROCEDURE — 99499 UNLISTED E&M SERVICE: CPT | Mod: 93,,, | Performed by: PHYSICIAN ASSISTANT

## 2025-04-23 ENCOUNTER — HOSPITAL ENCOUNTER (OUTPATIENT)
Dept: RADIOLOGY | Facility: HOSPITAL | Age: 70
Discharge: HOME | End: 2025-04-23
Attending: FAMILY MEDICINE
Payer: OTHER GOVERNMENT

## 2025-04-23 DIAGNOSIS — Z12.31 ENCOUNTER FOR SCREENING MAMMOGRAM FOR MALIGNANT NEOPLASM OF BREAST: ICD-10-CM

## 2025-04-23 PROCEDURE — 77063 BREAST TOMOSYNTHESIS BI: CPT | Mod: 26,,, | Performed by: RADIOLOGY

## 2025-04-23 PROCEDURE — 77067 SCR MAMMO BI INCL CAD: CPT | Mod: 26,,, | Performed by: RADIOLOGY

## 2025-04-23 PROCEDURE — 77063 BREAST TOMOSYNTHESIS BI: CPT | Mod: TC,PN

## 2025-04-25 DIAGNOSIS — I10 PRIMARY HYPERTENSION: ICD-10-CM

## 2025-04-25 DIAGNOSIS — E11.9 TYPE 2 DIABETES MELLITUS WITHOUT COMPLICATION, WITHOUT LONG-TERM CURRENT USE OF INSULIN: ICD-10-CM

## 2025-04-25 DIAGNOSIS — E78.5 HYPERLIPIDEMIA, UNSPECIFIED HYPERLIPIDEMIA TYPE: ICD-10-CM

## 2025-04-25 DIAGNOSIS — I10 ESSENTIAL HYPERTENSION: ICD-10-CM

## 2025-04-25 DIAGNOSIS — M54.30 SCIATICA, UNSPECIFIED LATERALITY: ICD-10-CM

## 2025-04-25 RX ORDER — AMLODIPINE BESYLATE 5 MG/1
5 TABLET ORAL DAILY
Qty: 90 TABLET | Refills: 1 | Status: SHIPPED | OUTPATIENT
Start: 2025-04-25 | End: 2026-04-25

## 2025-04-25 RX ORDER — GABAPENTIN 300 MG/1
300 CAPSULE ORAL 3 TIMES DAILY
Qty: 90 CAPSULE | Refills: 1 | Status: SHIPPED | OUTPATIENT
Start: 2025-04-25 | End: 2025-06-24

## 2025-04-25 RX ORDER — LANCETS
EACH MISCELLANEOUS
Qty: 200 EACH | Refills: 3 | Status: SHIPPED | OUTPATIENT
Start: 2025-04-25

## 2025-05-05 RX ORDER — METOPROLOL TARTRATE 50 MG/1
50 TABLET ORAL 2 TIMES DAILY
Qty: 180 TABLET | Refills: 3 | Status: SHIPPED | OUTPATIENT
Start: 2025-05-05 | End: 2026-05-05

## 2025-05-05 RX ORDER — METFORMIN HYDROCHLORIDE 1000 MG/1
1000 TABLET ORAL 2 TIMES DAILY
Qty: 180 TABLET | Refills: 3 | Status: SHIPPED | OUTPATIENT
Start: 2025-05-05 | End: 2026-05-05

## 2025-05-05 RX ORDER — ATORVASTATIN CALCIUM 80 MG/1
80 TABLET, FILM COATED ORAL DAILY
Qty: 90 TABLET | Refills: 3 | Status: SHIPPED | OUTPATIENT
Start: 2025-05-05 | End: 2026-05-05

## 2025-05-05 RX ORDER — HYDROCHLOROTHIAZIDE 25 MG/1
25 TABLET ORAL DAILY
Qty: 90 TABLET | Refills: 3 | Status: SHIPPED | OUTPATIENT
Start: 2025-05-05 | End: 2026-05-05

## 2025-05-05 RX ORDER — LISINOPRIL 40 MG/1
40 TABLET ORAL DAILY
Qty: 90 TABLET | Refills: 3 | Status: SHIPPED | OUTPATIENT
Start: 2025-05-05 | End: 2026-05-05

## 2025-05-19 ENCOUNTER — TELEPHONE (OUTPATIENT)
Dept: ORTHOPEDICS | Facility: CLINIC | Age: 70
End: 2025-05-19
Payer: OTHER GOVERNMENT

## 2025-05-19 DIAGNOSIS — E78.5 HYPERLIPIDEMIA, UNSPECIFIED HYPERLIPIDEMIA TYPE: ICD-10-CM

## 2025-05-19 DIAGNOSIS — I10 ESSENTIAL HYPERTENSION: ICD-10-CM

## 2025-05-19 RX ORDER — METOPROLOL TARTRATE 50 MG/1
50 TABLET ORAL 2 TIMES DAILY
Qty: 180 TABLET | Refills: 3 | Status: SHIPPED | OUTPATIENT
Start: 2025-05-19 | End: 2026-05-19

## 2025-05-19 RX ORDER — ATORVASTATIN CALCIUM 80 MG/1
80 TABLET, FILM COATED ORAL DAILY
Qty: 90 TABLET | Refills: 3 | Status: SHIPPED | OUTPATIENT
Start: 2025-05-19 | End: 2026-05-19

## 2025-05-19 RX ORDER — LISINOPRIL 40 MG/1
40 TABLET ORAL DAILY
Qty: 90 TABLET | Refills: 3 | Status: SHIPPED | OUTPATIENT
Start: 2025-05-19 | End: 2026-05-19

## 2025-05-19 NOTE — TELEPHONE ENCOUNTER
Called patient and left msg with pain management appt date/time and location.    ----- Message from Nicki Cox PA-C sent at 5/19/2025  7:04 AM CDT -----  Will you please reschedule with pain management in clinic?  ----- Message -----  From: Alejandro Tripp MD  Sent: 5/19/2025   6:13 AM CDT  To: Nicki Cox PA-C    Pt interested in further interventions other than pain meds for backI see her in my clinic [facility] - not on [facility] blood sugars are much much improved from previous - I am ok with her getting steroid injeciton if warranted; fasting glucoses are well under 150 on avg now and rarely going over 200 on post prandialMike

## 2025-05-31 ENCOUNTER — HOSPITAL ENCOUNTER (EMERGENCY)
Facility: HOSPITAL | Age: 70
Discharge: HOME | End: 2025-05-31
Attending: EMERGENCY MEDICINE
Payer: OTHER GOVERNMENT

## 2025-05-31 VITALS
HEIGHT: 65 IN | BODY MASS INDEX: 33.32 KG/M2 | TEMPERATURE: 98 F | HEART RATE: 84 BPM | DIASTOLIC BLOOD PRESSURE: 68 MMHG | RESPIRATION RATE: 20 BRPM | OXYGEN SATURATION: 100 % | SYSTOLIC BLOOD PRESSURE: 149 MMHG | WEIGHT: 200 LBS

## 2025-05-31 DIAGNOSIS — S09.90XA MINOR HEAD INJURY, INITIAL ENCOUNTER: Primary | ICD-10-CM

## 2025-05-31 DIAGNOSIS — S00.01XA SCALP ABRASION, INITIAL ENCOUNTER: ICD-10-CM

## 2025-05-31 PROCEDURE — 99284 EMERGENCY DEPT VISIT MOD MDM: CPT | Mod: 25,ER

## 2025-05-31 NOTE — ED NOTES
"Assumed care of pt who came in reporting that her leg gave out and she fell striking her head on a dresser. She denies any current pain or LOC and has been having issues with that "leg / knee due to my sciatica". She is A & O X 4 sitting quietly in no distress with no gross neuro deficits. There is blood present to her head. Will have to look more closely to R/O a laceration.   "

## 2025-05-31 NOTE — DISCHARGE INSTRUCTIONS
You have had a minor head injury, you will likely experience concussion symptoms such as nausea, headache, confusion and this is normal.  If you have nonstop vomiting, severe pain, unequal pupils, change in her mental state please return to emergency department for reevaluation.  Take your medications as prescribed.  Follow up with your primary care physician in one week.  Refer to the additional material provided for further information including when to return to the emergency department.

## 2025-05-31 NOTE — ED NOTES
Head wound was cleaned and appears to be a small abrasion - No lac present. Scant amount of bleeding present.

## 2025-05-31 NOTE — ED PROVIDER NOTES
Emergency Department Encounter  Provider Note    Nam William  [id number]  5/31/2025    Evaluation:    History Acquisition:     Chief Complaint   Patient presents with    Fall     Pt reports falling at home after right leg gave out. Pt hit head on cabinet. No LOC. No blood thinners.        History of Present Illness:  Nam William who is a 69 y.o. female who presents to the ED today for head injury.  Pt fell today as she was walking to throw away her plate from lunch and her leg gave out on her.  She had taken an extra gabapentin this morning for her sciatica which did make her a little drowsy.  Thinks that might also contribute to why she fell. She also reports that the sciatica makes her leg give out as well.  When she fell she hit her head on the corner of a cabinet.  She had some bleeding at home.  Denies LOC, vomiting after the event. She is not on blood thinners. No hip pain, no UE or LE pain. No change in her sciatica pain.        Prior medical records were reviewed:   Pt was seen by orthopedics 4/21/25 for follow up of MRI, MRI lumbar spine demonstrates moderate stenosis at L4/5.          The patient's list of active medical history, family/social history, medications, and allergies as documented has been reviewed.     Past Medical History:   Diagnosis Date    Diabetes mellitus, type 2     Hypertension      History reviewed. No pertinent surgical history.  Family History   Problem Relation Name Age of Onset    Breast cancer Sister      Breast cancer Sister       Social History     Socioeconomic History    Marital status:    Tobacco Use    Smoking status: Never    Smokeless tobacco: Never   Substance and Sexual Activity    Alcohol use: No    Drug use: No       Medications:  Medication List with Changes/Refills   Current Medications    AMLODIPINE (NORVASC) 5 MG TABLET    Take 1 tablet (5 mg total) by mouth once daily.    ATORVASTATIN (LIPITOR) 80 MG TABLET    Take 1 tablet (80 mg total) by mouth once  daily.    BLOOD PRESSURE CUFF MISC    1 kit by Misc.(Non-Drug; Combo Route) route once daily.    BLOOD SUGAR DIAGNOSTIC STRP    To check BG 4 times daily, to use with insurance preferred meter    BLOOD-GLUCOSE METER KIT    To check BG 4 times daily, to use with insurance preferred meter    DICLOFENAC SODIUM (VOLTAREN ARTHRITIS PAIN) 1 % GEL    Apply 2 g topically once daily.    GABAPENTIN (NEURONTIN) 300 MG CAPSULE    Take 1 capsule (300 mg total) by mouth 3 (three) times daily.    HYDROCHLOROTHIAZIDE (HYDRODIURIL) 25 MG TABLET    Take 1 tablet (25 mg total) by mouth once daily.    LANCETS (LANCETS,THIN) MISC    Use to test BG twice daily.    LIDOCAINE (LIDODERM) 5 %    Place 1 patch onto the skin once daily. Remove & Discard patch within 12 hours or as directed by MD    LISINOPRIL (PRINIVIL,ZESTRIL) 40 MG TABLET    Take 1 tablet (40 mg total) by mouth once daily.    METFORMIN (GLUCOPHAGE) 1000 MG TABLET    Take 1 tablet (1,000 mg total) by mouth 2 (two) times daily.    METOPROLOL TARTRATE (LOPRESSOR) 50 MG TABLET    Take 1 tablet (50 mg total) by mouth 2 (two) times daily.    [facility]    Toilet seat with safety rails    NAPROXEN (NAPROSYN) 500 MG TABLET    Take 1 tablet (500 mg total) by mouth 2 (two) times daily with meals.       Allergies:  Review of patient's allergies indicates:  No Known Allergies      Physical Exam:     ED Triage Vitals [05/31/25 1521]   BP (!) 144/79   Pulse 96   Resp 18   Temp 98 °F (36.7 °C)   SpO2 99 %     Physical Exam    Physical Exam  Vitals and nursing note reviewed.   Constitutional:       General: No acute distress.     Appearance: Normal appearance. Well-developed.   HEENT:      Head: Normocephalic, there is an abrasion to the parietal area on the left, no lacerations, no need for repair.      Mouth: Mucous membranes are moist.      Eyes: No scleral icterus. No discharge. Conjunctivae normal.   Cardiovascular:      Rate and Rhythm: Normal rate.   Pulmonary:       Effort:  Pulmonary effort is normal. No respiratory distress.    Musculoskeletal:         There is no deformity to the upper or lower extremities bilaterally.   Skin:     General: Skin is warm and dry.   Neurological:      Mental Status: Alert and oriented.     Cranial Nerves: No cranial nerve deficit.       Differential Diagnoses:   Based on available information and initial assessment, differential diagnosis includes but is not limited to Scalp contusion, concussion, laceration, skull fracture,diffuse axonal injury, countercoup injury, intracranial hemorrhage such as subdural hematoma, subarachnoid hemorrhage or epidural hematoma, cerebral contusion, soft tissue injury, paraspinal or spinal injury      ED Management:     Orders Placed This Encounter    CT Head Without Contrast    Nursing communication    Orthostatic vital signs             Imaging:   Independently interpreted the imaging ordered by myself see ED course  Imaging Results              CT Head Without Contrast (Final result)  Result time 05/31/25 16:39:24      Final result by Jimmy Guerrero MD (05/31/25 16:39:24)                   Impression:      Atrophy and chronic white matter changes No acute process seen.  Recommend follow-up if symptoms persist      All CT scans at [this location] are performed using dose modulation techniques as appropriate to a performed exam including the following: automated exposure control; adjustment of the mA and/or kV according to patient size (this includes techniques or standardized protocols for targeted exams where dose is matched to indication / reason for exam; i.e. extremities or head); use of iterative reconstruction technique.      Finalized on: 5/31/2025 4:39 PM By:  Jimmy Guerrero MD BERYL PhD  [facility]# [id number]      2025-05-31 16:41:28.007     [facility]               Narrative:    EXAM: CT HEAD WITHOUT CONTRAST    CLINICAL HISTORY: Pain    TECHNIQUE: Contiguous axial images were obtained from the skull base  through the vertex without intravenous contrast.    COMPARISON: None available.    FINDINGS: No intracranial hemorrhage mass effect or midline shift.   No extra axial fluid collections.  Atrophy and chronic white matter changes.  There is no evidence of hydrocephalus.    The paranasal sinuses and mastoid air cells are clear.  No fractures are identified.  No concerning osseous lesions.                                           Medications Given:   Medications - No data to display     Medical Decision Making:    Additional Consideration:        Social determinants of health considered during development of treatment plan include:   Body mass index is 33.28 kg/m². - Cumulative social disadvantage, denoted by higher SDOH burden, was associated with increased odds of obesity, independent of clinical and demographic factors. PMID: 50885094 DOI: 10.1002/marco antonio.78623  Current co-morbidities considered which impacted clinical decision making:  has a past medical history of Diabetes mellitus, type 2 and Hypertension.                Medical Decision Making  Problems Addressed:  Minor head injury, initial encounter: complicated acute illness or injury  Scalp abrasion, initial encounter: complicated acute illness or injury    Amount and/or Complexity of Data Reviewed  External Data Reviewed: notes.  Radiology: ordered and independent interpretation performed.     Details: CT Head without intracranial hemorrhage.     Risk  OTC drugs.  Diagnosis or treatment significantly limited by social determinants of health.        Nam William presents to the ED tonight with a minor head injury after a fall. CT head without acute abnormality.  She has an abrasion to the head that has been cleaned.  No need for repair.  Discussed wound care.  Followup with PCP.            Clinical Impression:       ICD-10-CM ICD-9-CM   1. Minor head injury, initial encounter  S09.90XA 959.01   2. Scalp abrasion, initial encounter  S00.01XA 910.0        Discharge Medications:  New Prescriptions    No medications on file         Follow-up Information       Follow up With Specialties Details Why Contact Info    Alejandro Tripp MD Family Medicine  As needed [address]  [address]  [facility] [address] [phone]               ED Disposition Condition    Discharge Stable                Michelle Watkins MD  05/31/25 [id number]

## 2025-06-04 ENCOUNTER — HOSPITAL ENCOUNTER (EMERGENCY)
Facility: HOSPITAL | Age: 70
Discharge: HOME | End: 2025-06-04
Attending: EMERGENCY MEDICINE
Payer: OTHER GOVERNMENT

## 2025-06-04 VITALS
TEMPERATURE: 98 F | OXYGEN SATURATION: 100 % | HEART RATE: 85 BPM | RESPIRATION RATE: 17 BRPM | SYSTOLIC BLOOD PRESSURE: 133 MMHG | BODY MASS INDEX: 33.32 KG/M2 | WEIGHT: 200 LBS | HEIGHT: 65 IN | DIASTOLIC BLOOD PRESSURE: 71 MMHG

## 2025-06-04 DIAGNOSIS — R06.02 SHORTNESS OF BREATH: ICD-10-CM

## 2025-06-04 DIAGNOSIS — R07.89 CHEST HEAVINESS: Primary | ICD-10-CM

## 2025-06-04 LAB
ABSOLUTE EOSINOPHIL (OHS): 0.14 K/UL
ABSOLUTE MONOCYTE (OHS): 0.58 K/UL (ref 0.3–1)
ABSOLUTE NEUTROPHIL COUNT (OHS): 4.47 K/UL (ref 1.8–7.7)
ALBUMIN SERPL BCP-MCNC: 4.4 G/DL (ref 3.5–5.2)
ALP SERPL-CCNC: 51 UNIT/L (ref 38–126)
ALT SERPL W/O P-5'-P-CCNC: 14 UNIT/L (ref 10–44)
ANION GAP (OHS): 14 MMOL/L (ref 8–16)
AST SERPL-CCNC: 18 UNIT/L (ref 15–46)
BASOPHILS # BLD AUTO: 0.07 K/UL
BASOPHILS NFR BLD AUTO: 0.9 %
BILIRUB SERPL-MCNC: 0.7 MG/DL (ref 0.1–1)
BUN SERPL-MCNC: 23 MG/DL (ref 7–17)
CALCIUM SERPL-MCNC: 9.6 MG/DL (ref 8.7–10.5)
CHLORIDE SERPL-SCNC: 103 MMOL/L (ref 95–110)
CO2 SERPL-SCNC: 25 MMOL/L (ref 23–29)
CREAT SERPL-MCNC: 0.8 MG/DL (ref 0.5–1.4)
ERYTHROCYTE [DISTWIDTH] IN BLOOD BY AUTOMATED COUNT: 12.5 % (ref 11.5–14.5)
GFR SERPLBLD CREATININE-BSD FMLA CKD-EPI: >60 ML/MIN/1.73/M2
GLUCOSE SERPL-MCNC: 88 MG/DL (ref 70–110)
HCT VFR BLD AUTO: 39.7 % (ref 37–48.5)
HGB BLD-MCNC: 13.2 GM/DL (ref 12–16)
IMM GRANULOCYTES # BLD AUTO: 0.02 K/UL (ref 0–0.04)
IMM GRANULOCYTES NFR BLD AUTO: 0.3 % (ref 0–0.5)
LYMPHOCYTES # BLD AUTO: 2.18 K/UL (ref 1–4.8)
MCH RBC QN AUTO: 28.8 PG (ref 27–31)
MCHC RBC AUTO-ENTMCNC: 33.2 G/DL (ref 32–36)
MCV RBC AUTO: 87 FL (ref 82–98)
NT-PROBNP SERPL-MCNC: 161 PG/ML (ref 5–900)
NUCLEATED RBC (/100WBC) (OHS): 0 /100 WBC
PLATELET # BLD AUTO: 182 K/UL (ref 150–450)
PMV BLD AUTO: 11.5 FL (ref 9.2–12.9)
POTASSIUM SERPL-SCNC: 3.8 MMOL/L (ref 3.5–5.1)
PROT SERPL-MCNC: 7.2 GM/DL (ref 6–8.4)
RBC # BLD AUTO: 4.59 M/UL (ref 4–5.4)
RELATIVE EOSINOPHIL (OHS): 1.9 %
RELATIVE LYMPHOCYTE (OHS): 29.2 % (ref 18–48)
RELATIVE MONOCYTE (OHS): 7.8 % (ref 4–15)
RELATIVE NEUTROPHIL (OHS): 59.9 % (ref 38–73)
SODIUM SERPL-SCNC: 142 MMOL/L (ref 136–145)
TROPONIN I SERPL DL<=0.01 NG/ML-MCNC: <0.012 NG/ML (ref 0–0.03)
TROPONIN I SERPL DL<=0.01 NG/ML-MCNC: <0.012 NG/ML (ref 0–0.03)
WBC # BLD AUTO: 7.46 K/UL (ref 3.9–12.7)

## 2025-06-04 PROCEDURE — 93010 ELECTROCARDIOGRAM REPORT: CPT | Mod: ,,, | Performed by: INTERNAL MEDICINE

## 2025-06-04 PROCEDURE — 99285 EMERGENCY DEPT VISIT HI MDM: CPT | Mod: 25,ER

## 2025-06-04 PROCEDURE — 93005 ELECTROCARDIOGRAM TRACING: CPT | Mod: ER

## 2025-06-04 PROCEDURE — 84484 ASSAY OF TROPONIN QUANT: CPT | Mod: ER

## 2025-06-04 PROCEDURE — 80053 COMPREHEN METABOLIC PANEL: CPT | Mod: ER

## 2025-06-04 PROCEDURE — 83880 ASSAY OF NATRIURETIC PEPTIDE: CPT | Mod: ER

## 2025-06-04 PROCEDURE — 25000003 PHARM REV CODE 250: Mod: ER

## 2025-06-04 PROCEDURE — 85025 COMPLETE CBC W/AUTO DIFF WBC: CPT | Mod: ER

## 2025-06-04 RX ORDER — HYDROXYZINE PAMOATE 25 MG/1
25 CAPSULE ORAL
Status: COMPLETED | OUTPATIENT
Start: 2025-06-04 | End: 2025-06-04

## 2025-06-04 RX ADMIN — HYDROXYZINE PAMOATE 25 MG: 25 CAPSULE ORAL at 08:06

## 2025-06-05 LAB
OHS QRS DURATION: 84 MS
OHS QTC CALCULATION: 415 MS

## 2025-06-05 NOTE — DISCHARGE INSTRUCTIONS
Today you were seen in the emergency room for chest pain. After doing and EKG and lab work, it was determined the source of your pain may not be due to a life threatening condition such as a heart attack.  Chest pain can be caused by many things such as heartburn, viral syndrome, anxiety or stress, heart or lung problems. The chest wall is composed of bones, muscles, and cartilage. Any of these can be the source of the pain. Most of the time, nonspecific chest pain will improve within 2 to 3 days with rest and mild pain medicine.     Over the next few days, avoid physical activities that brings on chest pain. Do not smoke. Avoid caffeine, chocolate and drinking alcohol. Only take over-the-counter or prescription medicine for pain, discomfort, or fever as directed by your healthcare provider.  Do not eat anything 1-2 hours prior to lying down to go to sleep, as this may make heartburn/reflux worse.     Please return to the emergency room with any worsening symptoms such as shortness of breath, coughing up blood, increased pain spreading to jaw, back, or abdomen, weakness, vomiting, or any others. Regardless, you will need to follow up with your primary care doctor to have further workup and management of this chest pain.     Thank you for allowing me and my emergency team to take care of you here today! I hope you feel better soon. Please do not hesitate to return with any additional concerns that may arise from this or any new problem you encounter.    Our goal in the emergency department is to always give you outstanding care and exceptional service. If you receive a survey by mail or e-mail in the next week regarding your experience in our ED, we would greatly appreciate you completing it. Your feedback provides us with a way to recognize our staff who give very good care and it helps us learn how to improve when your experience was below the excellence we aspire to be!    Brook [place], PA-C [facility]   Emergency Room Physician Assistant

## 2025-06-05 NOTE — ED PROVIDER NOTES
Encounter Date: 6/4/2025       History     Chief Complaint   Patient presents with    Shortness of Breath     Feels like its hard to breath, heart felt funny earlier but not really now, anxious feeling      Patient is a 69-year-old female with a past medical history of diabetes mellitus type 2 and hypertension who presents to emergency room for chest pain and shortness of breath.  Patient states that chest pain started about an hour and a half ago.  Describes it as a heavy sensation.  States that it is gradually improving.  At the time of onset, patient was attempting to call her children and they were not answering.  She is unsure whether or not symptoms are related to anxiety or recent decreased dose of gabapentin.  Patient states that she was on gabapentin throughout the day, and over the past few weeks has been just taking it at night.  She denies headache, visual changes, weakness, numbness, tingling, nausea, vomiting, abdominal pain, back pain, or others at this time.  No medications taken prior to arrival.     The history is provided by the patient. No  was used.     Review of patient's allergies indicates:  No Known Allergies  Past Medical History:   Diagnosis Date    Diabetes mellitus, type 2     Hypertension      History reviewed. No pertinent surgical history.  Family History   Problem Relation Name Age of Onset    Breast cancer Sister      Breast cancer Sister       Social History[1]  Review of Systems   Constitutional:  Negative for chills, diaphoresis, fatigue and fever.   HENT:  Negative for congestion, sore throat and trouble swallowing.    Respiratory:  Positive for shortness of breath. Negative for cough.    Cardiovascular:  Positive for chest pain. Negative for palpitations.   Gastrointestinal:  Negative for abdominal pain, blood in stool, constipation, diarrhea, nausea and vomiting.   Genitourinary:  Negative for difficulty urinating, dysuria, frequency and urgency.    Musculoskeletal:  Negative for back pain and myalgias.   Skin:  Negative for rash and wound.   Neurological:  Negative for weakness, light-headedness, numbness and headaches.       Physical Exam     Initial Vitals [06/04/25 1904]   BP Pulse Resp Temp SpO2   (!) 162/86 81 18 98.7 °F (37.1 °C) 100 %      MAP       --         Physical Exam    Nursing note and vitals reviewed.  Constitutional: She appears well-developed and well-nourished. She is not diaphoretic. No distress.   Patient lying in bed, appears anxious on my examination.  Tearful.   HENT:   Head: Normocephalic and atraumatic.   Right Ear: External ear normal.   Left Ear: External ear normal.   Eyes: Conjunctivae and EOM are normal. Pupils are equal, round, and reactive to light.   Neck: Neck supple.   Normal range of motion.  Cardiovascular:  Normal rate, regular rhythm and normal heart sounds.     Exam reveals no friction rub.       No murmur heard.  Pulmonary/Chest: Breath sounds normal. No respiratory distress. She has no wheezes. She has no rhonchi. She has no rales.   Abdominal: Abdomen is soft. Bowel sounds are normal. She exhibits no distension. There is no abdominal tenderness. There is no rebound and no guarding.   Musculoskeletal:         General: No tenderness or edema. Normal range of motion.      Cervical back: Normal range of motion and neck supple.     Neurological: She is alert and oriented to person, place, and time. She has normal strength.   Skin: Skin is warm and dry.   Psychiatric: Her behavior is normal. Thought content normal. Her mood appears anxious.         ED Course   Procedures  Labs Reviewed   COMPREHENSIVE METABOLIC PANEL - Abnormal       Result Value    Sodium 142      Potassium 3.8      Chloride 103      CO2 25      Glucose 88      BUN 23 (*)     Creatinine 0.8      Calcium 9.6      Protein Total 7.2      Albumin 4.4      Bilirubin Total 0.7      ALP 51      AST 18      ALT 14      Anion Gap 14      eGFR >60     TROPONIN I  - Normal    Troponin-I <0.012     NT-PRO NATRIURETIC PEPTIDE - Normal    NT-proBNP 161     CBC WITH DIFFERENTIAL - Normal    WBC 7.46      RBC 4.59      HGB 13.2      HCT 39.7      MCV 87      MCH 28.8      MCHC 33.2      RDW 12.5      Platelet Count 182      MPV 11.5      Nucleated RBC 0      Neut % 59.9      Lymph % 29.2      Mono % 7.8      Eos % 1.9      Basophil % 0.9      Imm Grans % 0.3      Neut # 4.47      Lymph # 2.18      Mono # 0.58      Eos # 0.14      Baso # 0.07      Imm Grans # 0.02     TROPONIN I - Normal    Troponin-I <0.012     CBC W/ AUTO DIFFERENTIAL    Narrative:     The following orders were created for panel order CBC Auto Differential.  Procedure                               Abnormality         Status                     ---------                               -----------         ------                     CBC with Differential[[id number]]       Normal              Final result                 Please view results for these tests on the individual orders.          Imaging Results              X-Ray Chest 1 View (Final result)  Result time 06/04/25 20:00:35      Final result by Daniel Garcia DO (06/04/25 20:00:35)                   Impression:    No acute cardiopulmonary disease.    Finalized on: 6/4/2025 8:00 PM By:  Daniel Garcia  [facility]# [id number]      2025-06-04 20:02:42.346     [facility]               Narrative:    Exam: XR CHEST 1 VIEW    Comparison: None    Clinical Indication:  Shortness of breath    Findings: Heart size and pulmonary vasculature are unremarkable.  No focal consolidation, pleural effusions or pneumothorax.    No acute angulated or displaced fractures.                                         Medications   hydrOXYzine pamoate capsule 25 mg (25 mg Oral Given 6/4/25 2027)     Medical Decision Making  Patient presents to emergency room for chest pain and shortness of breath.  Vital signs stable.  Physical exam as stated above.    Differential Diagnosis includes, but is not  limited to ACS/MI, PE, aortic dissection, pneumothorax, cardiac tamponade, pericarditis/myocarditis, pneumonia, infection/abscess, lung mass, trauma/fracture, costochondritis/pleurisy, MSK pain/contusion, GERD, biliary disease, pancreatitis, or anemia.  Patient without tachycardia.  O2 saturation greater than 95%.  Low suspicion for pulmonary embolism.  Chest x-ray without mediastinal widening.  Unlikely dissection.  Chest x-ray without signs of pneumothorax, pneumonia, or mass.  No cardiomegaly.  I do not suspect pericarditis/myocarditis.  Lab work relatively unremarkable.  No anemia or electrolyte abnormalities.  EKG unremarkable.  Troponin 1 and 2 within normal limits.  Patient with heart score of 3.  Clinical presentation and physical exam most suggestive of anxiety.  Can not rule out other nonemergent etiologies of chest pain/angina at this time.  Patient is stable for outpatient follow up for further workup and management chest pain.  Referral placed to cardiology.    I see no indication of an emergent process beyond that addressed during our encounter. Patient stable for discharge at this time. I have counseled the patient regarding follow up with Cardiology/primary care and gave strict return precautions. I have discussed the final diagnosis and gave instructions regarding home medications. Patient verbalized understanding and is agreeable.     Problems Addressed:  Chest heaviness: acute illness or injury  Shortness of breath: acute illness or injury    Amount and/or Complexity of Data Reviewed  External Data Reviewed: notes.     Details: Patient last seen by orthopedics for lumbar radiculopathy in 04/2025.  Labs: ordered. Decision-making details documented in ED Course.  Radiology: ordered. Decision-making details documented in ED Course.  ECG/medicine tests: ordered. Decision-making details documented in ED Course.    Risk  Prescription drug management.  Risk Details: Comorbidities taken into consideration  during the patient's evaluation and treatment include diabetes mellitus type 2 and hypertension.  Social determinants of health taken into consideration during development of our treatment plan include difficulty in obtaining follow-up, obtaining medications, health literacy, access to healthy options for preventative/conservative management, and/or support systems due to, but not limited to, transportation limitations, socioeconomic status, and environmental factors.       Additional MDM:   Heart Score:    History:          Slightly suspicious.  ECG:             Normal  Age:               >65 years  Risk factors: 1-2 risk factors  Troponin:       Less than or equal to normal limit  Heart Score = 3                ED Course as of 06/04/25 2302   Wed Jun 04, 2025 1935 EKG 12-lead  Independent interpretation of EKG normal sinus rhythm at 80 beats per minute.  No ST elevations.  No T-wave inversions.  No obvious arrhythmias. [BJ]   2003 CBC Auto Differential  CBC grossly unremarkable.  No leukocytosis.  H/H stable and within normal limits.  Platelet count within normal limits. [BJ]   2003 Comprehensive Metabolic Panel(!)  CMP grossly unremarkable.  Electrolytes within normal limits.  Creatinine within normal limits.  LFTs within normal limits. [BJ]   2003 X-Ray Chest 1 View  Independent interpretation of chest x-ray without effusion, consolidation, or pneumothorax.  Trachea midline. No cardiomegaly. Agree with radiology reading.  [BJ]   2013 NT-proBNP: 161  Within normal limits. [BJ]   2013 Troponin I: <0.012  Within normal limits. [BJ]   2259 Troponin I: <0.012  Troponin negative. [BJ]      ED Course User Index  [BJ] Meredith Hernandez PA-C                           Clinical Impression:  Final diagnoses:  [R06.02] Shortness of breath  [R07.89] Chest heaviness (Primary)          ED Disposition Condition    Discharge Stable          ED Prescriptions    None       Follow-up Information       Follow up With Specialties  Details Why Contact [facility] - Emergency Dept Emergency Medicine Go to  If new or worsening symptoms occur [address]  Emergency Department  [facility] [id number] [phone]    [place] - Cardiology Cardiology   [address] [facility] [address] [phone]    Alejandro Tripp MD Family Medicine   [address]  [address]  [place] [address]  [phone]              This note was partially created using [employer] Voice Recognition software. Typographical and content errors may occur with this process. While efforts are made to detect and correct such errors, in some cases errors will persist. For this reason, wording in this document should be considered in the proper context and not strictly verbatim.          [1]   Social History  Tobacco Use    Smoking status: Never    Smokeless tobacco: Never   Substance Use Topics    Alcohol use: No    Drug use: No        Meredith Hernandez PA-C  06/04/25 9374

## 2025-06-08 ENCOUNTER — HOSPITAL ENCOUNTER (EMERGENCY)
Facility: HOSPITAL | Age: 70
Discharge: HOME | End: 2025-06-08
Attending: EMERGENCY MEDICINE
Payer: OTHER GOVERNMENT

## 2025-06-08 VITALS
DIASTOLIC BLOOD PRESSURE: 86 MMHG | WEIGHT: 200 LBS | OXYGEN SATURATION: 98 % | HEIGHT: 65 IN | SYSTOLIC BLOOD PRESSURE: 176 MMHG | RESPIRATION RATE: 19 BRPM | TEMPERATURE: 98 F | HEART RATE: 77 BPM | BODY MASS INDEX: 33.32 KG/M2

## 2025-06-08 DIAGNOSIS — R06.02 SHORTNESS OF BREATH: ICD-10-CM

## 2025-06-08 LAB
ABSOLUTE EOSINOPHIL (OHS): 0.07 K/UL
ABSOLUTE MONOCYTE (OHS): 0.52 K/UL (ref 0.3–1)
ABSOLUTE NEUTROPHIL COUNT (OHS): 5.98 K/UL (ref 1.8–7.7)
ALBUMIN SERPL BCP-MCNC: 4.1 G/DL (ref 3.5–5.2)
ALP SERPL-CCNC: 46 UNIT/L (ref 40–150)
ALT SERPL W/O P-5'-P-CCNC: 9 UNIT/L (ref 10–44)
ANION GAP (OHS): 15 MMOL/L (ref 8–16)
AST SERPL-CCNC: 14 UNIT/L (ref 11–45)
BASOPHILS # BLD AUTO: 0.04 K/UL
BASOPHILS NFR BLD AUTO: 0.5 %
BILIRUB SERPL-MCNC: 1 MG/DL (ref 0.1–1)
BNP SERPL-MCNC: 50 PG/ML (ref 0–99)
BUN SERPL-MCNC: 20 MG/DL (ref 8–23)
CALCIUM SERPL-MCNC: 9.9 MG/DL (ref 8.7–10.5)
CHLORIDE SERPL-SCNC: 104 MMOL/L (ref 95–110)
CO2 SERPL-SCNC: 19 MMOL/L (ref 23–29)
CREAT SERPL-MCNC: 0.9 MG/DL (ref 0.5–1.4)
D DIMER PPP IA.FEU-MCNC: 0.39 MG/L FEU
ERYTHROCYTE [DISTWIDTH] IN BLOOD BY AUTOMATED COUNT: 12.4 % (ref 11.5–14.5)
GFR SERPLBLD CREATININE-BSD FMLA CKD-EPI: >60 ML/MIN/1.73/M2
GLUCOSE SERPL-MCNC: 172 MG/DL (ref 70–110)
HCT VFR BLD AUTO: 38.9 % (ref 37–48.5)
HGB BLD-MCNC: 13.4 GM/DL (ref 12–16)
IMM GRANULOCYTES # BLD AUTO: 0.03 K/UL (ref 0–0.04)
IMM GRANULOCYTES NFR BLD AUTO: 0.4 % (ref 0–0.5)
LYMPHOCYTES # BLD AUTO: 1.88 K/UL (ref 1–4.8)
MCH RBC QN AUTO: 28.8 PG (ref 27–31)
MCHC RBC AUTO-ENTMCNC: 34.4 G/DL (ref 32–36)
MCV RBC AUTO: 84 FL (ref 82–98)
NUCLEATED RBC (/100WBC) (OHS): 0 /100 WBC
PLATELET # BLD AUTO: 176 K/UL (ref 150–450)
PMV BLD AUTO: 11.2 FL (ref 9.2–12.9)
POTASSIUM SERPL-SCNC: 3.6 MMOL/L (ref 3.5–5.1)
PROT SERPL-MCNC: 7.4 GM/DL (ref 6–8.4)
RBC # BLD AUTO: 4.65 M/UL (ref 4–5.4)
RELATIVE EOSINOPHIL (OHS): 0.8 %
RELATIVE LYMPHOCYTE (OHS): 22.1 % (ref 18–48)
RELATIVE MONOCYTE (OHS): 6.1 % (ref 4–15)
RELATIVE NEUTROPHIL (OHS): 70.1 % (ref 38–73)
SODIUM SERPL-SCNC: 138 MMOL/L (ref 136–145)
TROPONIN I SERPL DL<=0.01 NG/ML-MCNC: 0.01 NG/ML
TROPONIN I SERPL DL<=0.01 NG/ML-MCNC: 0.01 NG/ML
WBC # BLD AUTO: 8.52 K/UL (ref 3.9–12.7)

## 2025-06-08 PROCEDURE — 99285 EMERGENCY DEPT VISIT HI MDM: CPT | Mod: 25

## 2025-06-08 PROCEDURE — 85379 FIBRIN DEGRADATION QUANT: CPT

## 2025-06-08 PROCEDURE — 25000003 PHARM REV CODE 250

## 2025-06-08 PROCEDURE — 93005 ELECTROCARDIOGRAM TRACING: CPT

## 2025-06-08 PROCEDURE — 85025 COMPLETE CBC W/AUTO DIFF WBC: CPT

## 2025-06-08 PROCEDURE — 36415 COLL VENOUS BLD VENIPUNCTURE: CPT

## 2025-06-08 PROCEDURE — 80053 COMPREHEN METABOLIC PANEL: CPT

## 2025-06-08 PROCEDURE — 83880 ASSAY OF NATRIURETIC PEPTIDE: CPT

## 2025-06-08 PROCEDURE — 93010 ELECTROCARDIOGRAM REPORT: CPT | Mod: ,,, | Performed by: INTERNAL MEDICINE

## 2025-06-08 PROCEDURE — 84484 ASSAY OF TROPONIN QUANT: CPT

## 2025-06-08 RX ORDER — HYDROXYZINE PAMOATE 25 MG/1
25 CAPSULE ORAL
Status: COMPLETED | OUTPATIENT
Start: 2025-06-08 | End: 2025-06-08

## 2025-06-08 RX ORDER — LORAZEPAM 2 MG/ML
1 INJECTION INTRAMUSCULAR
Status: DISCONTINUED | OUTPATIENT
Start: 2025-06-08 | End: 2025-06-08

## 2025-06-08 RX ORDER — HYDROXYZINE PAMOATE 25 MG/1
25 CAPSULE ORAL EVERY 8 HOURS PRN
Qty: 30 CAPSULE | Refills: 0 | Status: SHIPPED | OUTPATIENT
Start: 2025-06-08

## 2025-06-08 RX ADMIN — HYDROXYZINE PAMOATE 25 MG: 25 CAPSULE ORAL at 11:06

## 2025-06-08 NOTE — DISCHARGE INSTRUCTIONS
Thank you for allowing me and my emergency team to take care of you here today! I hope you feel better soon. Please do not hesitate to return with any additional concerns that may arise from this or any new problem you encounter.    Our goal in the emergency department is to always give you outstanding care and exceptional service. If you receive a survey by mail or e-mail in the next week regarding your experience in our ED, we would greatly appreciate you completing it. Your feedback provides us with a way to recognize our staff who give very good care and it helps us learn how to improve when your experience was below the excellence we aspire to be!    Brook Juneau, PA-C [facility]   Emergency Room Physician Assistant

## 2025-06-08 NOTE — ED NOTES
Reviewed discharge instructions along where to  prescription. Educated on hydroxyzine, strength, and medication indications.Verbalized understanding and agreement.

## 2025-06-08 NOTE — ED PROVIDER NOTES
"Encounter Date: 6/8/2025       History     Chief Complaint   Patient presents with    Shortness of Breath     Shortness of breath and generalized weakness that started this morning.      Patient is a 69-year-old female with a past medical history of diabetes mellitus type 2 and hypertension who presents to emergency room for persistent shortness of breath and generalized weakness.  Patient states that she was seen in the emergency room last week for similar complaint.  However, symptoms have been worsening in severity.  She is frustrated that she "has not been able to get any answers." She was seen by primary care physician 2 days ago and was told to completely stop gabapentin.  She is not taking this medicine since then.  She reports that generalized weakness worsened as of this morning.  She feels more fatigued than usual.  No numbness, tingling, headache, visual changes, nausea, vomiting, changes in bowel movements, or others at this time.  Denies chest pain.  No worsening or alleviating factors in regards to shortness of breath.  No medications taken prior to arrival.  Unsure if this is "due to her anxiety."    The history is provided by the patient. No  was used.     Review of patient's allergies indicates:  No Known Allergies  Past Medical History:   Diagnosis Date    Diabetes mellitus, type 2     Hypertension      No past surgical history on file.  Family History   Problem Relation Name Age of Onset    Breast cancer Sister      Breast cancer Sister       Social History[1]  Review of Systems   Constitutional:  Positive for fatigue. Negative for chills, diaphoresis and fever.   HENT:  Negative for congestion, sore throat and trouble swallowing.    Respiratory:  Positive for shortness of breath. Negative for cough.    Cardiovascular:  Negative for chest pain and palpitations.   Gastrointestinal:  Negative for abdominal pain, blood in stool, constipation, diarrhea, nausea and vomiting. "   Genitourinary:  Negative for difficulty urinating, dysuria, frequency and urgency.   Musculoskeletal:  Negative for back pain and myalgias.   Skin:  Negative for rash and wound.   Neurological:  Positive for weakness (generalized). Negative for light-headedness, numbness and headaches.       Physical Exam     Initial Vitals [06/08/25 1042]   BP Pulse Resp Temp SpO2   (!) 196/93 86 20 97.7 °F (36.5 °C) 99 %      MAP       --         Physical Exam    Nursing note and vitals reviewed.  Constitutional: She appears well-developed and well-nourished. She is not diaphoretic. No distress.   HENT:   Head: Normocephalic and atraumatic.   Right Ear: External ear normal.   Left Ear: External ear normal.   Eyes: Conjunctivae and EOM are normal.   Neck: Neck supple.   Normal range of motion.  Cardiovascular:  Normal rate and regular rhythm.           Pulmonary/Chest: Breath sounds normal. No respiratory distress. She has no wheezes. She has no rhonchi. She has no rales.   Abdominal: Abdomen is soft. She exhibits no distension. There is no abdominal tenderness. There is no rebound and no guarding.   Musculoskeletal:         General: No tenderness or edema. Normal range of motion.      Cervical back: Normal range of motion and neck supple.     Neurological: She is alert and oriented to person, place, and time. She has normal strength.   Skin: Skin is warm.   Psychiatric: She has a normal mood and affect. Her behavior is normal. Thought content normal.         ED Course   Procedures  Labs Reviewed   COMPREHENSIVE METABOLIC PANEL - Abnormal       Result Value    Sodium 138      Potassium 3.6      Chloride 104      CO2 19 (*)     Glucose 172 (*)     BUN 20      Creatinine 0.9      Calcium 9.9      Protein Total 7.4      Albumin 4.1      Bilirubin Total 1.0      ALP 46      AST 14      ALT 9 (*)     Anion Gap 15      eGFR >60     TROPONIN I - Normal    Troponin-I 0.011     B-TYPE NATRIURETIC PEPTIDE - Normal    BNP 50     D DIMER,  QUANTITATIVE - Normal    D-Dimer 0.39     CBC WITH DIFFERENTIAL - Normal    WBC 8.52      RBC 4.65      HGB 13.4      HCT 38.9      MCV 84      MCH 28.8      MCHC 34.4      RDW 12.4      Platelet Count 176      MPV 11.2      Nucleated RBC 0      Neut % 70.1      Lymph % 22.1      Mono % 6.1      Eos % 0.8      Basophil % 0.5      Imm Grans % 0.4      Neut # 5.98      Lymph # 1.88      Mono # 0.52      Eos # 0.07      Baso # 0.04      Imm Grans # 0.03     TROPONIN I - Normal    Troponin-I 0.008     CBC W/ AUTO DIFFERENTIAL    Narrative:     The following orders were created for panel order CBC Auto Differential.  Procedure                               Abnormality         Status                     ---------                               -----------         ------                     CBC with Differential[[id number]]       Normal              Final result                 Please view results for these tests on the individual orders.          Imaging Results              X-Ray Chest 1 View (Final result)  Result time 06/08/25 13:56:33      Final result by Naina Barrow MD (06/08/25 13:56:33)                   Impression:      No acute abnormality.      Electronically signed by: Naina Barrow MD  Date:    06/08/2025  Time:    13:56               Narrative:    EXAMINATION:  XR CHEST 1 VIEW    CLINICAL HISTORY:  shortness of breath;    TECHNIQUE:  Single frontal view of the chest was performed.    COMPARISON:  06/04/2025    FINDINGS:  The lungs are clear, with normal appearance of pulmonary vasculature and no pleural effusion or pneumothorax.    The cardiac silhouette is normal in size. The hilar and mediastinal contours are unremarkable.    Bones are intact.                                       Medications   hydrOXYzine pamoate capsule 25 mg (25 mg Oral Given 6/8/25 1112)     Medical Decision Making  Patient presents to emergency room for shortness of breath.  Blood pressure 196/93.  Vital signs otherwise  stable and within normal limits.  Physical exam as stated above.    Differential Diagnosis includes, but is not limited to PE, MI/ACS, pneumothorax, pericardial effusion/tamonade, pneumonia, lung abscess, pericarditis/myocarditis, pleural effusion, lung mass, CHF exacerbation, asthma exacerbation, COPD exacerbation, aspirated/ingested foreign body, airway obstruction, anemia, metabolic derangement, allergy/atopy, influenza, viral URI, or viral syndrome.  Patient is not tachycardic.  Maintaining O2 saturation greater than 95%.  D-dimer negative.  Low suspicion for pulmonary embolism at this time.  No history of asthma, CHF, or COPD.  No history of aspirated/ingested foreign body.  Chest x-ray unremarkable. Lab work unremarkable.  No evidence of anemia or metabolic derangement.  EKG unremarkable.  Troponin 1 and 2 within normal limits.  Patient ambulatory O2 saturation of 100%.  Clinical presentation and physical exam most suggestive of anxiety versus other nonemergent etiology for shortness of breath.  Patient was given hydroxyzine in the emergency room.  Will prescribe same medication to use upon discharge.  Referral placed to cardiology.    I see no indication of an emergent process beyond that addressed during our encounter. Patient stable for discharge at this time. I have counseled the patient regarding follow up with PCP/cardiology and gave strict return precautions. I have discussed the final diagnosis and gave instructions regarding prescribed medications. Patient verbalized understanding and is agreeable.     Problems Addressed:  Shortness of breath: acute illness or injury    Amount and/or Complexity of Data Reviewed  External Data Reviewed: notes.     Details: Patient last seen by myself on 06/04/2025 for chest heaviness and shortness of breath.  Unremarkable workup at that time.  Labs: ordered. Decision-making details documented in ED Course.  Radiology: ordered. Decision-making details documented in ED  Course.  ECG/medicine tests: ordered. Decision-making details documented in ED Course.    Risk  Prescription drug management.  Risk Details: Comorbidities taken into consideration during the patient's evaluation and treatment include with a past medical history of diabetes mellitus type 2 and hypertension.    Social determinants of health taken into consideration during development of our treatment plan include difficulty in obtaining follow-up, obtaining medications, health literacy, access to healthy options for preventative/conservative management, and/or support systems due to, but not limited to, transportation limitations, socioeconomic status, and environmental factors.                ED Course as of 06/08/25 1733   Sun Jun 08, 2025   1139 CBC Auto Differential  CBC grossly unremarkable.  No leukocytosis H/H stable and within normal limits.  Platelet count within normal limits. [BJ]   1200 Comprehensive Metabolic Panel(!)  CMP grossly unremarkable.  Electrolytes within normal limits.  BUN and creatinine within normal limits.  LFTs grossly unremarkable.  No anion gap. [BJ]   1229 Troponin I: 0.011  Troponin within normal limits. [BJ]   1229 BNP: 50  BNP within normal limits. [BJ]   1229 D-Dimer: 0.39  D-dimer within normal limits. [BJ]   1411 X-Ray Chest 1 View  Independent interpretation of chest x-ray without effusion, consolidation, or pneumothorax.  Trachea midline. No cardiomegaly. Agree with radiology reading.  [BJ]   1510 Ambulatory O2 saturation on room air is 100%. [BJ]   1539 Troponin I: 0.008  Repeat troponin within normal limits. [BJ]      ED Course User Index  [BJ] Meredith Hernandez PA-C                           Clinical Impression:  Final diagnoses:  [R06.02] Shortness of breath          ED Disposition Condition    Discharge Stable          ED Prescriptions       Medication Sig Dispense Start Date End Date Auth. Provider    hydrOXYzine pamoate (VISTARIL) 25 MG Cap Take 1 capsule (25 mg total) by  mouth every 8 (eight) hours as needed. 30 capsule 6/8/2025 -- Meredith Hernandez PA-C          Follow-up Information       Follow up With Specialties Details Why Contact Info Additional Information    Alejandro Tripp MD Family Medicine   [address]  [address]  [place] [address]  [phone]       [facility] Emergency Dept Emergency Medicine Go to  If new or worsening symptoms occur [address]  [facility] [address] [phone]     [place] - Cardiology Cardiology   [address]  [address]  [facility] [address] [phone] Please park in [address] or [address] and enter building by using [address] entrance. Check in at central registration near [facility]. Proceed to [address] waiting area once checked in.            This note was partially created using [employer] Voice Recognition software. Typographical and content errors may occur with this process. While efforts are made to detect and correct such errors, in some cases errors will persist. For this reason, wording in this document should be considered in the proper context and not strictly verbatim.          [1]   Social History  Tobacco Use    Smoking status: Never    Smokeless tobacco: Never   Substance Use Topics    Alcohol use: No    Drug use: No        Meredith Hernandez PA-C  06/08/25 9017

## 2025-06-09 ENCOUNTER — TELEPHONE (OUTPATIENT)
Dept: CARDIOLOGY | Facility: CLINIC | Age: 70
End: 2025-06-09
Payer: MEDICARE

## 2025-06-09 LAB
OHS QRS DURATION: 86 MS
OHS QTC CALCULATION: 432 MS

## 2025-06-09 NOTE — TELEPHONE ENCOUNTER
Veronica (call center) called to assist scheduling patient for the next available. Assisted scheduling appointment 06/12/25 at 1:40 pm.     Ciara MARQUES

## 2025-06-12 ENCOUNTER — OFFICE VISIT (OUTPATIENT)
Dept: CARDIOLOGY | Facility: CLINIC | Age: 70
End: 2025-06-12
Payer: OTHER GOVERNMENT

## 2025-06-12 ENCOUNTER — HOSPITAL ENCOUNTER (OUTPATIENT)
Facility: HOSPITAL | Age: 70
Discharge: HOME | End: 2025-06-14
Attending: EMERGENCY MEDICINE | Admitting: FAMILY MEDICINE
Payer: MEDICARE

## 2025-06-12 VITALS
SYSTOLIC BLOOD PRESSURE: 88 MMHG | HEART RATE: 92 BPM | WEIGHT: 198 LBS | HEIGHT: 63 IN | OXYGEN SATURATION: 98 % | BODY MASS INDEX: 35.08 KG/M2 | DIASTOLIC BLOOD PRESSURE: 60 MMHG

## 2025-06-12 DIAGNOSIS — R06.09 DYSPNEA ON EXERTION: ICD-10-CM

## 2025-06-12 DIAGNOSIS — E78.2 MIXED HYPERLIPIDEMIA: Primary | ICD-10-CM

## 2025-06-12 DIAGNOSIS — R07.9 CHEST PAIN: ICD-10-CM

## 2025-06-12 DIAGNOSIS — E78.5 HYPERLIPIDEMIA, UNSPECIFIED HYPERLIPIDEMIA TYPE: ICD-10-CM

## 2025-06-12 DIAGNOSIS — R06.09 DYSPNEA ON EXERTION: Primary | ICD-10-CM

## 2025-06-12 DIAGNOSIS — I20.0 UNSTABLE ANGINA: ICD-10-CM

## 2025-06-12 DIAGNOSIS — E11.69 TYPE 2 DIABETES MELLITUS WITH OTHER SPECIFIED COMPLICATION, WITHOUT LONG-TERM CURRENT USE OF INSULIN: ICD-10-CM

## 2025-06-12 DIAGNOSIS — R06.00 DYSPNEA, UNSPECIFIED TYPE: ICD-10-CM

## 2025-06-12 DIAGNOSIS — R06.02 SHORTNESS OF BREATH: ICD-10-CM

## 2025-06-12 DIAGNOSIS — I10 PRIMARY HYPERTENSION: ICD-10-CM

## 2025-06-12 LAB
ABSOLUTE EOSINOPHIL (OHS): 0.08 K/UL
ABSOLUTE MONOCYTE (OHS): 0.57 K/UL (ref 0.3–1)
ABSOLUTE NEUTROPHIL COUNT (OHS): 5.1 K/UL (ref 1.8–7.7)
ALBUMIN SERPL BCP-MCNC: 4.2 G/DL (ref 3.5–5.2)
ALP SERPL-CCNC: 48 UNIT/L (ref 40–150)
ALT SERPL W/O P-5'-P-CCNC: 12 UNIT/L (ref 10–44)
ANION GAP (OHS): 13 MMOL/L (ref 8–16)
AST SERPL-CCNC: 13 UNIT/L (ref 11–45)
BASOPHILS # BLD AUTO: 0.07 K/UL
BASOPHILS NFR BLD AUTO: 0.9 %
BILIRUB SERPL-MCNC: 0.8 MG/DL (ref 0.1–1)
BNP SERPL-MCNC: 25 PG/ML (ref 0–99)
BUN SERPL-MCNC: 26 MG/DL (ref 8–23)
CALCIUM SERPL-MCNC: 10 MG/DL (ref 8.7–10.5)
CHLORIDE SERPL-SCNC: 103 MMOL/L (ref 95–110)
CO2 SERPL-SCNC: 25 MMOL/L (ref 23–29)
CREAT SERPL-MCNC: 1.2 MG/DL (ref 0.5–1.4)
D DIMER PPP IA.FEU-MCNC: 0.41 MG/L FEU
EAG (OHS): 160 MG/DL (ref 68–131)
ERYTHROCYTE [DISTWIDTH] IN BLOOD BY AUTOMATED COUNT: 12.4 % (ref 11.5–14.5)
GFR SERPLBLD CREATININE-BSD FMLA CKD-EPI: 50 ML/MIN/1.73/M2
GLUCOSE SERPL-MCNC: 192 MG/DL (ref 70–110)
HBA1C MFR BLD: 7.2 % (ref 4–5.6)
HCT VFR BLD AUTO: 40.5 % (ref 37–48.5)
HGB BLD-MCNC: 13.7 GM/DL (ref 12–16)
IMM GRANULOCYTES # BLD AUTO: 0.01 K/UL (ref 0–0.04)
IMM GRANULOCYTES NFR BLD AUTO: 0.1 % (ref 0–0.5)
LYMPHOCYTES # BLD AUTO: 1.74 K/UL (ref 1–4.8)
MCH RBC QN AUTO: 28.5 PG (ref 27–31)
MCHC RBC AUTO-ENTMCNC: 33.8 G/DL (ref 32–36)
MCV RBC AUTO: 84 FL (ref 82–98)
NUCLEATED RBC (/100WBC) (OHS): 0 /100 WBC
PLATELET # BLD AUTO: 217 K/UL (ref 150–450)
PMV BLD AUTO: 11.7 FL (ref 9.2–12.9)
POCT GLUCOSE: 157 MG/DL (ref 70–110)
POTASSIUM SERPL-SCNC: 3.5 MMOL/L (ref 3.5–5.1)
PROT SERPL-MCNC: 7.8 GM/DL (ref 6–8.4)
RBC # BLD AUTO: 4.81 M/UL (ref 4–5.4)
RELATIVE EOSINOPHIL (OHS): 1.1 %
RELATIVE LYMPHOCYTE (OHS): 23 % (ref 18–48)
RELATIVE MONOCYTE (OHS): 7.5 % (ref 4–15)
RELATIVE NEUTROPHIL (OHS): 67.4 % (ref 38–73)
SODIUM SERPL-SCNC: 141 MMOL/L (ref 136–145)
TROPONIN I SERPL DL<=0.01 NG/ML-MCNC: 0.01 NG/ML
TROPONIN I SERPL DL<=0.01 NG/ML-MCNC: 0.01 NG/ML
WBC # BLD AUTO: 7.57 K/UL (ref 3.9–12.7)

## 2025-06-12 PROCEDURE — 83880 ASSAY OF NATRIURETIC PEPTIDE: CPT | Performed by: NURSE PRACTITIONER

## 2025-06-12 PROCEDURE — 99205 OFFICE O/P NEW HI 60 MIN: CPT | Mod: S$PBB,,, | Performed by: INTERNAL MEDICINE

## 2025-06-12 PROCEDURE — 84484 ASSAY OF TROPONIN QUANT: CPT | Performed by: NURSE PRACTITIONER

## 2025-06-12 PROCEDURE — 94761 N-INVAS EAR/PLS OXIMETRY MLT: CPT

## 2025-06-12 PROCEDURE — G0378 HOSPITAL OBSERVATION PER HR: HCPCS

## 2025-06-12 PROCEDURE — 99214 OFFICE O/P EST MOD 30 MIN: CPT | Mod: PBBFAC,PN | Performed by: INTERNAL MEDICINE

## 2025-06-12 PROCEDURE — 25000003 PHARM REV CODE 250: Performed by: NURSE PRACTITIONER

## 2025-06-12 PROCEDURE — 85025 COMPLETE CBC W/AUTO DIFF WBC: CPT | Performed by: NURSE PRACTITIONER

## 2025-06-12 PROCEDURE — 80053 COMPREHEN METABOLIC PANEL: CPT | Performed by: NURSE PRACTITIONER

## 2025-06-12 PROCEDURE — 36415 COLL VENOUS BLD VENIPUNCTURE: CPT | Performed by: NURSE PRACTITIONER

## 2025-06-12 PROCEDURE — 85379 FIBRIN DEGRADATION QUANT: CPT | Performed by: NURSE PRACTITIONER

## 2025-06-12 PROCEDURE — 63600175 PHARM REV CODE 636 W HCPCS: Performed by: NURSE PRACTITIONER

## 2025-06-12 PROCEDURE — 93010 ELECTROCARDIOGRAM REPORT: CPT | Mod: ICN,,, | Performed by: INTERNAL MEDICINE

## 2025-06-12 PROCEDURE — 96372 THER/PROPH/DIAG INJ SC/IM: CPT | Performed by: NURSE PRACTITIONER

## 2025-06-12 PROCEDURE — 83036 HEMOGLOBIN GLYCOSYLATED A1C: CPT | Performed by: NURSE PRACTITIONER

## 2025-06-12 PROCEDURE — 99285 EMERGENCY DEPT VISIT HI MDM: CPT | Mod: 25,27

## 2025-06-12 PROCEDURE — 93005 ELECTROCARDIOGRAM TRACING: CPT

## 2025-06-12 PROCEDURE — 99999 PR PBB SHADOW E&M-EST. PATIENT-LVL IV: CPT | Mod: PBBFAC,,, | Performed by: INTERNAL MEDICINE

## 2025-06-12 RX ORDER — SODIUM CHLORIDE 0.9 % (FLUSH) 0.9 %
10 SYRINGE (ML) INJECTION
Status: DISCONTINUED | OUTPATIENT
Start: 2025-06-12 | End: 2025-06-14 | Stop reason: HOSPADM

## 2025-06-12 RX ORDER — METOPROLOL TARTRATE 50 MG/1
50 TABLET ORAL 2 TIMES DAILY
Status: DISCONTINUED | OUTPATIENT
Start: 2025-06-12 | End: 2025-06-14 | Stop reason: HOSPADM

## 2025-06-12 RX ORDER — ENOXAPARIN SODIUM 100 MG/ML
40 INJECTION SUBCUTANEOUS EVERY 24 HOURS
Status: DISCONTINUED | OUTPATIENT
Start: 2025-06-12 | End: 2025-06-14 | Stop reason: HOSPADM

## 2025-06-12 RX ORDER — SODIUM CHLORIDE 0.9 % (FLUSH) 0.9 %
10 SYRINGE (ML) INJECTION EVERY 12 HOURS PRN
Status: DISCONTINUED | OUTPATIENT
Start: 2025-06-12 | End: 2025-06-14 | Stop reason: HOSPADM

## 2025-06-12 RX ORDER — IBUPROFEN 200 MG
16 TABLET ORAL
Status: DISCONTINUED | OUTPATIENT
Start: 2025-06-12 | End: 2025-06-14

## 2025-06-12 RX ORDER — TALC
6 POWDER (GRAM) TOPICAL NIGHTLY PRN
Status: DISCONTINUED | OUTPATIENT
Start: 2025-06-12 | End: 2025-06-14 | Stop reason: HOSPADM

## 2025-06-12 RX ORDER — IBUPROFEN 200 MG
16 TABLET ORAL
Status: DISCONTINUED | OUTPATIENT
Start: 2025-06-12 | End: 2025-06-14 | Stop reason: HOSPADM

## 2025-06-12 RX ORDER — TALC
6 POWDER (GRAM) TOPICAL NIGHTLY PRN
Status: DISCONTINUED | OUTPATIENT
Start: 2025-06-12 | End: 2025-06-12

## 2025-06-12 RX ORDER — HYDROCODONE BITARTRATE AND ACETAMINOPHEN 5; 325 MG/1; MG/1
1 TABLET ORAL EVERY 6 HOURS PRN
Refills: 0 | Status: DISCONTINUED | OUTPATIENT
Start: 2025-06-12 | End: 2025-06-14 | Stop reason: HOSPADM

## 2025-06-12 RX ORDER — GLUCAGON 1 MG
1 KIT INJECTION
Status: DISCONTINUED | OUTPATIENT
Start: 2025-06-12 | End: 2025-06-14 | Stop reason: HOSPADM

## 2025-06-12 RX ORDER — SIMETHICONE 80 MG
1 TABLET,CHEWABLE ORAL 4 TIMES DAILY PRN
Status: DISCONTINUED | OUTPATIENT
Start: 2025-06-12 | End: 2025-06-14 | Stop reason: HOSPADM

## 2025-06-12 RX ORDER — IBUPROFEN 200 MG
24 TABLET ORAL
Status: DISCONTINUED | OUTPATIENT
Start: 2025-06-12 | End: 2025-06-14 | Stop reason: HOSPADM

## 2025-06-12 RX ORDER — HYDROCHLOROTHIAZIDE 25 MG/1
25 TABLET ORAL DAILY
Status: DISCONTINUED | OUTPATIENT
Start: 2025-06-13 | End: 2025-06-14 | Stop reason: HOSPADM

## 2025-06-12 RX ORDER — HYDROXYZINE PAMOATE 25 MG/1
25 CAPSULE ORAL
Status: COMPLETED | OUTPATIENT
Start: 2025-06-12 | End: 2025-06-12

## 2025-06-12 RX ORDER — AMOXICILLIN 250 MG
1 CAPSULE ORAL 2 TIMES DAILY
Status: DISCONTINUED | OUTPATIENT
Start: 2025-06-12 | End: 2025-06-14 | Stop reason: HOSPADM

## 2025-06-12 RX ORDER — ATORVASTATIN CALCIUM 40 MG/1
80 TABLET, FILM COATED ORAL DAILY
Status: DISCONTINUED | OUTPATIENT
Start: 2025-06-13 | End: 2025-06-14 | Stop reason: HOSPADM

## 2025-06-12 RX ORDER — INSULIN ASPART 100 [IU]/ML
0-10 INJECTION, SOLUTION INTRAVENOUS; SUBCUTANEOUS
Status: DISCONTINUED | OUTPATIENT
Start: 2025-06-12 | End: 2025-06-14 | Stop reason: HOSPADM

## 2025-06-12 RX ORDER — AMLODIPINE BESYLATE 5 MG/1
5 TABLET ORAL DAILY
Status: DISCONTINUED | OUTPATIENT
Start: 2025-06-13 | End: 2025-06-14 | Stop reason: HOSPADM

## 2025-06-12 RX ORDER — LANOLIN ALCOHOL/MO/W.PET/CERES
1000 CREAM (GRAM) TOPICAL DAILY
COMMUNITY

## 2025-06-12 RX ORDER — IBUPROFEN 200 MG
24 TABLET ORAL
Status: DISCONTINUED | OUTPATIENT
Start: 2025-06-12 | End: 2025-06-14

## 2025-06-12 RX ORDER — THIAMINE HCL 50 MG
50 TABLET ORAL DAILY
COMMUNITY

## 2025-06-12 RX ORDER — LORAZEPAM 0.5 MG/1
0.5 TABLET ORAL
Status: DISCONTINUED | OUTPATIENT
Start: 2025-06-12 | End: 2025-06-12

## 2025-06-12 RX ORDER — ACETAMINOPHEN 325 MG/1
650 TABLET ORAL EVERY 4 HOURS PRN
Status: DISCONTINUED | OUTPATIENT
Start: 2025-06-12 | End: 2025-06-14 | Stop reason: HOSPADM

## 2025-06-12 RX ORDER — GLIPIZIDE 10 MG/1
10 TABLET ORAL 2 TIMES DAILY
COMMUNITY
Start: 2025-03-27

## 2025-06-12 RX ORDER — NALOXONE HCL 0.4 MG/ML
0.02 VIAL (ML) INJECTION
Status: DISCONTINUED | OUTPATIENT
Start: 2025-06-12 | End: 2025-06-14 | Stop reason: HOSPADM

## 2025-06-12 RX ADMIN — HYDROXYZINE PAMOATE 25 MG: 25 CAPSULE ORAL at 05:06

## 2025-06-12 RX ADMIN — ENOXAPARIN SODIUM 40 MG: 40 INJECTION SUBCUTANEOUS at 09:06

## 2025-06-12 RX ADMIN — METOPROLOL TARTRATE 50 MG: 50 TABLET, FILM COATED ORAL at 09:06

## 2025-06-12 NOTE — ED NOTES
Patient was seen today at cardiology.  Patient begin to have SOB and hypotensive.  Patient symptoms has resolved.  Patient has a history of asthma.

## 2025-06-12 NOTE — ED NOTES
"Pt states she doesn't care if she needs to be stuck again, "she needs this IV out because it is pinching her."  "

## 2025-06-12 NOTE — PROGRESS NOTES
Subjective:   @Patient ID:  Nam William is a 69 y.o. female who presents for evaluation of shortness of breath    History of Present Illness    CHIEF COMPLAINT:  Patient presents today for dyspnea and weakness.    HISTORY OF PRESENT ILLNESS:  She reports experiencing dyspnea that started 2 weeks ago, particularly with activity, noting difficulty performing daily tasks such as washing dishes and preparing meals. She experienced chest heaviness on one occasion but denies chest pain. She reports progressive weakness that began in March, 4 months ago. She now requires a walker for ambulation, which is a significant change from baseline. She has experienced 3-4 falls due to leg giving out. She has had multiple ED visits for sciatica which also began 4 months ago.    MEDICAL HISTORY:  She has a history of diabetes, HTN, and HLD. She underwent a treadmill stress test in .    MEDICATIONS:  She currently takes Amlodipine (started March), Lipitor, Lisinopril, Metoprolol, Metformin, and Glipizide.    FAMILY HISTORY:  Her brother  of aneurysm at age 38.    SOCIAL HISTORY:  She is a never smoker and denies alcohol use.        HPI:   History of Present Illness    CHIEF COMPLAINT:  Patient presents today for dyspnea and weakness.    HISTORY OF PRESENT ILLNESS:  She reports experiencing dyspnea that started 2 weeks ago, particularly with activity, noting difficulty performing daily tasks such as washing dishes and preparing meals. She experienced chest heaviness on one occasion but denies chest pain currently. She reports progressive weakness that began in March, 4 months ago. She now requires a walker for ambulation, which is a significant change from baseline. She has experienced 3-4 falls due to leg giving out. She has had multiple ED visits shortness of breath and basic workup was negative.  Initially she was hypotensive but repeat blood pressure improved      MEDICAL HISTORY:  She has a history of diabetes, HTN, and  HLD. She underwent a treadmill stress test in .    MEDICATIONS:  She currently takes Amlodipine (started March), Lipitor, Lisinopril, Metoprolol, Metformin, and Glipizide.    FAMILY HISTORY:  Her brother  of aneurysm at age 38.    SOCIAL HISTORY:  She is a never smoker and denies alcohol use.           PMH:   Past Medical History:   Diagnosis Date    Diabetes mellitus, type 2     Hypertension           Prior cardiovascular  Hx  --------------------------------    - EKG 2025: Sinus rhythm with PVC, nonspecific ST changes       The 10-year ASCVD risk score (Gricel LYMAN, et al., 2019) is: 19%    Values used to calculate the score:      Age: 69 years      Sex: Female      Is Non- : No      Diabetic: Yes      Tobacco smoker: No      Systolic Blood Pressure: 133 mmHg      Is BP treated: Yes      HDL Cholesterol: 45 mg/dL      Total Cholesterol: 190 mg/dL      Patient Active Problem List    Diagnosis Date Noted    Dyspnea on exertion 2025    Unstable angina 2025    Back pain 2024    Weakness of both hips 2024    Decreased ROM of lumbar spine 2024    Decreased ROM of thoracic spine 2024    Acute pain of right shoulder 2022    Impaired strength of upper extremity 2022    Screening declined by patient 2020    Diabetes mellitus 2014    Hyperlipidemia 2014    Hypertension 2014    Severe obesity (BMI 35.0-39.9) with comorbidity 2014           Right Arm BP - Sittin/60  Left Arm BP - Sittin/56        LAST HbA1c  Lab Results   Component Value Date    HGBA1C 11.4 (H) 2025       Lipid panel  Lab Results   Component Value Date    CHOL 190 2025    CHOL 248 (H) 2024    CHOL 200 (H) 2023     Lab Results   Component Value Date    HDL 45 2025    HDL 51 2024    HDL 49 2023     Lab Results   Component Value Date    LDLCALC 105.0 2025    LDLCALC 155.4 2024    LDLCALC  115.2 09/20/2023     Lab Results   Component Value Date    TRIG 200 (H) 03/13/2025    TRIG 208 (H) 08/08/2024    TRIG 179 (H) 09/20/2023     Lab Results   Component Value Date    CHOLHDL 23.7 03/13/2025    CHOLHDL 20.6 08/08/2024    CHOLHDL 24.5 09/20/2023            Review of Systems   Constitutional: Positive for malaise/fatigue. Negative for chills and fever.   HENT:  Negative for hearing loss and nosebleeds.    Eyes:  Negative for blurred vision.   Cardiovascular:  Positive for chest pain and dyspnea on exertion.        As in HPI    Respiratory:  Positive for shortness of breath. Negative for cough and hemoptysis.    Endocrine: Negative for cold intolerance and polyuria.   Hematologic/Lymphatic: Negative for bleeding problem.   Skin:  Negative for itching.   Musculoskeletal:  Positive for muscle weakness.   Gastrointestinal:  Negative for abdominal pain and hematochezia.   Genitourinary:  Negative for hematuria.   Psychiatric/Behavioral:  Negative for altered mental status and depression.        Objective:   Physical Exam  Constitutional:       Appearance: She is well-developed. She is ill-appearing.   HENT:      Head: Normocephalic and atraumatic.   Eyes:      Conjunctiva/sclera: Conjunctivae normal.   Neck:      Vascular: No carotid bruit or JVD.   Cardiovascular:      Rate and Rhythm: Normal rate and regular rhythm.      Pulses:           Carotid pulses are 2+ on the right side and 2+ on the left side.       Radial pulses are 2+ on the right side and 2+ on the left side.      Heart sounds: Normal heart sounds. No murmur heard.     No friction rub. No gallop.   Pulmonary:      Effort: Pulmonary effort is normal. No respiratory distress.      Breath sounds: Normal breath sounds. No stridor. No wheezing.   Abdominal:      General: Abdomen is flat.      Palpations: Abdomen is soft.   Musculoskeletal:      Cervical back: Neck supple.      Right lower leg: No edema.      Left lower leg: No edema.   Skin:      General: Skin is warm and dry.   Neurological:      Mental Status: She is alert and oriented to person, place, and time.   Psychiatric:         Behavior: Behavior normal.         Assessment:     1. Mixed hyperlipidemia    2. Shortness of breath    3. Primary hypertension    4. Type 2 diabetes mellitus with other specified complication, without long-term current use of insulin    5. Dyspnea on exertion    6. Unstable angina        Plan:   1. Shortness of breath concerning for unstable angina  Her symptoms are very concerning.  It has been getting progressively worse  I will recommend ER evaluation and admission with inpatient workup  She will need cardiology consultation  She will need echocardiogram  She will need ischemic evaluation possibly with coronary angiogram given her concerning symptoms and risk factors including type 2 diabetes mellitus, hypertension, and hyperlipidemia    Discussed with the ER    Also recommend checking CPK to rule out rhabdomyolysis      2. Hypertension  Initially was hypotensive but that has improved    3. Hyperlipidemia  Continue high-intensity statin  Recommend checking CPK     - Referred to emergency department for admission and further evaluation.            Pertinent cardiac images and EKG reviewed independently.    Continue with current medical plan and lifestyle changes.  Return sooner for concerns or questions. If symptoms persist go to the ED  I have reviewed all pertinent data including patient's medical history in detail and updated the computerized patient record.     No orders of the defined types were placed in this encounter.      Follow up as scheduled.     -In today's visit, monitoring for drug toxicity was accomplished.     Greater than 50% of the visit was spent counseling, educating, and coordinating the care of the patient.     She expressed verbal understanding and agreed with the plan    Patient's Medications   New Prescriptions    No medications on file    Previous Medications    AMLODIPINE (NORVASC) 5 MG TABLET    Take 1 tablet (5 mg total) by mouth once daily.    ATORVASTATIN (LIPITOR) 80 MG TABLET    Take 1 tablet (80 mg total) by mouth once daily.    BLOOD PRESSURE CUFF MISC    1 kit by Misc.(Non-Drug; Combo Route) route once daily.    BLOOD SUGAR DIAGNOSTIC STRP    To check BG 4 times daily, to use with insurance preferred meter    BLOOD-GLUCOSE METER KIT    To check BG 4 times daily, to use with insurance preferred meter    DICLOFENAC SODIUM (VOLTAREN ARTHRITIS PAIN) 1 % GEL    Apply 2 g topically once daily.    GABAPENTIN (NEURONTIN) 300 MG CAPSULE    Take 1 capsule (300 mg total) by mouth 3 (three) times daily.    GLIPIZIDE (GLUCOTROL) 10 MG TABLET    Take 10 mg by mouth 2 (two) times daily.    HYDROCHLOROTHIAZIDE (HYDRODIURIL) 25 MG TABLET    Take 1 tablet (25 mg total) by mouth once daily.    HYDROXYZINE PAMOATE (VISTARIL) 25 MG CAP    Take 1 capsule (25 mg total) by mouth every 8 (eight) hours as needed.    LANCETS (LANCETS,THIN) MISC    Use to test BG twice daily.    LIDOCAINE (LIDODERM) 5 %    Place 1 patch onto the skin once daily. Remove & Discard patch within 12 hours or as directed by MD    LISINOPRIL (PRINIVIL,ZESTRIL) 40 MG TABLET    Take 1 tablet (40 mg total) by mouth once daily.    METFORMIN (GLUCOPHAGE) 1000 MG TABLET    Take 1 tablet (1,000 mg total) by mouth 2 (two) times daily.    METOPROLOL TARTRATE (LOPRESSOR) 50 MG TABLET    Take 1 tablet (50 mg total) by mouth 2 (two) times daily.    [facility]    Toilet seat with safety rails    NAPROXEN (NAPROSYN) 500 MG TABLET    Take 1 tablet (500 mg total) by mouth 2 (two) times daily with meals.    THIAMINE (VITAMIN B-1) 50 MG TABLET    Take 50 mg by mouth once daily.   Modified Medications    No medications on file   Discontinued Medications    No medications on file        This note was generated with the assistance of ambient listening technology. Verbal consent was  obtained by the patient and accompanying visitor(s) for the recording of patient appointment to facilitate this note. I attest to having reviewed and edited the generated note for accuracy, though some syntax or spelling errors may persist. Please contact the author of this note for any clarification.       Julito Tirado MD, [facility], [facility]  Heart and Vascular Interventional Cardiology

## 2025-06-12 NOTE — ED PROVIDER NOTES
Encounter Date: 6/12/2025       History     Chief Complaint   Patient presents with    Shortness of Breath     Ongoing shortness of breath. Seen by MD today, was found to be hypotensive at clinic (88/60) normotensive in triage.      69 yr old female presents to the ER with reports of SOB. Pt states she was seen per Dr Tirado earlier today in which he stated she needed to come to the ER for further evaluation and possible admission for angiogram. Pt denies chest pain at present however states over the past few months, she has had chest tightness. No radiation of pain. Pt reports SOB worsens with ambulation. No hemoptysis. No fever. Pt reports she is under a lot of stress lately as she is becoming more dependent on others to drive her around when she use to be able to handle things on her own. PMH of DM and HTN    The history is provided by the patient. No  was used.     Review of patient's allergies indicates:  No Known Allergies  Past Medical History:   Diagnosis Date    Diabetes mellitus, type 2     Hypertension      History reviewed. No pertinent surgical history.  Family History   Problem Relation Name Age of Onset    Breast cancer Sister      Breast cancer Sister       Social History[1]  Review of Systems   Constitutional:  Positive for fatigue.   Respiratory:  Positive for shortness of breath.    Neurological:  Positive for weakness.       Physical Exam     Initial Vitals [06/12/25 1424]   BP Pulse Resp Temp SpO2   133/71 86 18 97.7 °F (36.5 °C) 99 %      MAP       --         Physical Exam    Constitutional: She appears well-developed and well-nourished.   HENT:   Head: Normocephalic.   Right Ear: Hearing and tympanic membrane normal.   Left Ear: Hearing and tympanic membrane normal.   Nose: Nose normal. Mouth/Throat: Oropharynx is clear and moist.   Eyes: Lids are normal. Pupils are equal, round, and reactive to light.   Neck:   Normal range of motion.  Cardiovascular:  Normal rate.            Pulmonary/Chest: Breath sounds normal. No respiratory distress. She has no wheezes. She has no rhonchi.   Abdominal: Abdomen is soft. There is no abdominal tenderness.   Musculoskeletal:         General: Normal range of motion.      Cervical back: Normal range of motion. No rigidity.     Neurological: She is alert and oriented to person, place, and time.   Skin: Skin is warm and dry. No rash noted.   Psychiatric: She has a normal mood and affect. Her behavior is normal. Judgment and thought content normal.         ED Course   Procedures  Labs Reviewed   COMPREHENSIVE METABOLIC PANEL - Abnormal       Result Value    Sodium 141      Potassium 3.5      Chloride 103      CO2 25      Glucose 192 (*)     BUN 26 (*)     Creatinine 1.2      Calcium 10.0      Protein Total 7.8      Albumin 4.2      Bilirubin Total 0.8      ALP 48      AST 13      ALT 12      Anion Gap 13      eGFR 50 (*)    TROPONIN I - Normal    Troponin-I 0.008     B-TYPE NATRIURETIC PEPTIDE - Normal    BNP 25     D DIMER, QUANTITATIVE - Normal    D-Dimer 0.41     CBC WITH DIFFERENTIAL - Normal    WBC 7.57      RBC 4.81      HGB 13.7      HCT 40.5      MCV 84      MCH 28.5      MCHC 33.8      RDW 12.4      Platelet Count 217      MPV 11.7      Nucleated RBC 0      Neut % 67.4      Lymph % 23.0      Mono % 7.5      Eos % 1.1      Basophil % 0.9      Imm Grans % 0.1      Neut # 5.10      Lymph # 1.74      Mono # 0.57      Eos # 0.08      Baso # 0.07      Imm Grans # 0.01     CBC W/ AUTO DIFFERENTIAL    Narrative:     The following orders were created for panel order CBC Auto Differential.  Procedure                               Abnormality         Status                     ---------                               -----------         ------                     CBC with Differential[[id number]]       Normal              Final result                 Please view results for these tests on the individual orders.          Imaging Results              X-Ray  Chest 1 View (Final result)  Result time 06/12/25 17:05:22      Final result by Nik Pressley DO (06/12/25 17:05:22)                   Impression:      No acute abnormality.      Electronically signed by: Nik Pressley  Date:    06/12/2025  Time:    17:05               Narrative:    EXAMINATION:  XR CHEST 1 VIEW    CLINICAL HISTORY:  shortness of breath;    TECHNIQUE:  Single frontal view of the chest was performed.    COMPARISON:  06/08/2025.    FINDINGS:  The lungs are well expanded and clear. No focal opacities are seen. The pleural spaces are clear. The cardiac silhouette is unremarkable. The visualized osseous structures demonstrate degenerative changes.  There are calcifications of the aortic arch.                                       Medications   hydrOXYzine pamoate capsule 25 mg (25 mg Oral Given 6/12/25 1750)     Medical Decision Making  Differential Diagnosis includes, but is not limited to:  PE, MI/ACS, pneumothorax, pericardial effusion/tamonade, pneumonia, lung abscess, pericarditis/myocarditis, pleural effusion, lung mass, CHF exacerbation, asthma exacerbation, COPD exacerbation, aspirated/ingested foreign body, airway obstruction, CO poisoning, anemia, metabolic derangement, allergy/atopy, influenza, viral URI, viral syndrome.     Amount and/or Complexity of Data Reviewed  Labs: ordered. Decision-making details documented in ED Course.  Radiology: ordered.    Risk  Prescription drug management.  Decision regarding hospitalization.  Risk Details: Spoke with Dr Tirado who wanted to admit the pt for possible angio in the am. Pt was in his office with hypotension, and sob. Pt admitted to [facility].                ED Course as of 06/12/25 1828   Thu Jun 12, 2025   1430 Spoke with Dr Tirado who would like the pt admitted for SOB and Hypoxia.  [DT]   1527 CBC Auto Differential [DT]   1551 Troponin I [DT]   1551 Brain Natriuretic Peptide [DT]   1551 D-Dimer, Quantitative [DT]   1551 Comprehensive  Metabolic Panel(!) [DT]   1614 Chest xray pending.  [DT]   1640 EKG with rate of 84, NSR with no stemi noted. Nonspecific st changes [DT]   1723 FINDINGS:  The lungs are well expanded and clear. No focal opacities are seen. The pleural spaces are clear. The cardiac silhouette is unremarkable. The visualized osseous structures demonstrate degenerative changes.  There are calcifications of the aortic arch.     Impression:     No acute abnormality.        Electronically signed by:Nik Pressley  Date:                                            06/12/2025  Time:                                           17:05   [DT]   1733 [facility]  called for admission for shortness of breath, hypotension in doc office and further evaluation per cardiology.  [DT]   1740 Pt requesting meds for anxiety. Vistaril ordered.   [DT]      ED Course User Index  [DT] Marilin Salazar NP                           Clinical Impression:  Final diagnoses:  [R06.02] Shortness of breath          ED Disposition Condition    Observation                       Marilin Salazar NP  06/12/25 1740       Marilin Salazar NP  06/12/25 1828       [1]   Social History  Tobacco Use    Smoking status: Never    Smokeless tobacco: Never   Substance Use Topics    Alcohol use: No    Drug use: No        Marilin Salazar NP  06/12/25 1828

## 2025-06-13 LAB
ABSOLUTE EOSINOPHIL (OHS): 0.14 K/UL
ABSOLUTE EOSINOPHIL (OHS): 1.6 K/UL
ABSOLUTE MONOCYTE (OHS): 0.6 K/UL (ref 0.3–1)
ABSOLUTE MONOCYTE (OHS): 0.99 K/UL (ref 0.3–1)
ABSOLUTE NEUTROPHIL COUNT (OHS): 11.56 K/UL (ref 1.8–7.7)
ABSOLUTE NEUTROPHIL COUNT (OHS): 3.46 K/UL (ref 1.8–7.7)
ALBUMIN SERPL BCP-MCNC: 2.6 G/DL (ref 3.5–5.2)
ALBUMIN SERPL BCP-MCNC: 3.7 G/DL (ref 3.5–5.2)
ALP SERPL-CCNC: 168 UNIT/L (ref 40–150)
ALP SERPL-CCNC: 40 UNIT/L (ref 40–150)
ALT SERPL W/O P-5'-P-CCNC: 12 UNIT/L (ref 10–44)
ALT SERPL W/O P-5'-P-CCNC: 18 UNIT/L (ref 10–44)
ANION GAP (OHS): 10 MMOL/L (ref 8–16)
ANION GAP (OHS): 11 MMOL/L (ref 8–16)
ANION GAP (OHS): 24 MMOL/L (ref 8–16)
AORTIC SIZE INDEX (SOV): 1.5 CM/M2
AORTIC SIZE INDEX: 1.6 CM/M2
APICAL FOUR CHAMBER EJECTION FRACTION: 58 %
APICAL TWO CHAMBER EJECTION FRACTION: 37 %
ASCENDING AORTA: 3.1 CM
AST SERPL-CCNC: 12 UNIT/L (ref 11–45)
AST SERPL-CCNC: 34 UNIT/L (ref 11–45)
AV INDEX (PROSTH): 0.79
AV MEAN GRADIENT: 5 MMHG
AV PEAK GRADIENT: 8 MMHG
AV VALVE AREA BY VELOCITY RATIO: 2.7 CM²
AV VALVE AREA: 2.7 CM²
AV VELOCITY RATIO: 0.79
BASOPHILS # BLD AUTO: 0.07 K/UL
BASOPHILS # BLD AUTO: 0.15 K/UL
BASOPHILS NFR BLD AUTO: 1 %
BASOPHILS NFR BLD AUTO: 1.2 %
BILIRUB SERPL-MCNC: 0.6 MG/DL (ref 0.1–1)
BILIRUB SERPL-MCNC: 0.7 MG/DL (ref 0.1–1)
BSA FOR ECHO PROCEDURE: 2.04 M2
BUN SERPL-MCNC: 25 MG/DL (ref 8–23)
BUN SERPL-MCNC: 30 MG/DL (ref 8–23)
BUN SERPL-MCNC: 73 MG/DL (ref 8–23)
CALCIUM SERPL-MCNC: 8.9 MG/DL (ref 8.7–10.5)
CALCIUM SERPL-MCNC: 9.2 MG/DL (ref 8.7–10.5)
CALCIUM SERPL-MCNC: 9.7 MG/DL (ref 8.7–10.5)
CHLORIDE SERPL-SCNC: 105 MMOL/L (ref 95–110)
CHLORIDE SERPL-SCNC: 106 MMOL/L (ref 95–110)
CHLORIDE SERPL-SCNC: 97 MMOL/L (ref 95–110)
CK SERPL-CCNC: 51 U/L (ref 20–180)
CO2 SERPL-SCNC: 21 MMOL/L (ref 23–29)
CO2 SERPL-SCNC: 22 MMOL/L (ref 23–29)
CO2 SERPL-SCNC: 26 MMOL/L (ref 23–29)
CREAT SERPL-MCNC: 0.9 MG/DL (ref 0.5–1.4)
CREAT SERPL-MCNC: 1.2 MG/DL (ref 0.5–1.4)
CREAT SERPL-MCNC: 11.9 MG/DL (ref 0.5–1.4)
CV ECHO LV RWT: 0.38 CM
DOP CALC AO PEAK VEL: 1.4 M/S
DOP CALC AO VTI: 25.5 CM
DOP CALC LVOT AREA: 3.5 CM2
DOP CALC LVOT DIAMETER: 2.1 CM
DOP CALC LVOT PEAK VEL: 1.1 M/S
DOP CALC LVOT STROKE VOLUME: 69.9 CM3
DOP CALC MV VTI: 20.6 CM
DOP CALCLVOT PEAK VEL VTI: 20.2 CM
E WAVE DECELERATION TIME: 232 MSEC
E/A RATIO: 0.81
E/E' RATIO: 9 M/S
ECHO LV POSTERIOR WALL: 0.9 CM (ref 0.6–1.1)
ERYTHROCYTE [DISTWIDTH] IN BLOOD BY AUTOMATED COUNT: 12.4 % (ref 11.5–14.5)
ERYTHROCYTE [DISTWIDTH] IN BLOOD BY AUTOMATED COUNT: 20.7 % (ref 11.5–14.5)
FRACTIONAL SHORTENING: 21.3 % (ref 28–44)
GFR SERPLBLD CREATININE-BSD FMLA CKD-EPI: 3 ML/MIN/1.73/M2
GFR SERPLBLD CREATININE-BSD FMLA CKD-EPI: 50 ML/MIN/1.73/M2
GFR SERPLBLD CREATININE-BSD FMLA CKD-EPI: >60 ML/MIN/1.73/M2
GLUCOSE SERPL-MCNC: 122 MG/DL (ref 70–110)
GLUCOSE SERPL-MCNC: 178 MG/DL (ref 70–110)
GLUCOSE SERPL-MCNC: 181 MG/DL (ref 70–110)
HCT VFR BLD AUTO: 33.2 % (ref 37–48.5)
HCT VFR BLD AUTO: 37.4 % (ref 37–48.5)
HGB BLD-MCNC: 12.6 GM/DL (ref 12–16)
HGB BLD-MCNC: 9.9 GM/DL (ref 12–16)
IMM GRANULOCYTES # BLD AUTO: 0.02 K/UL (ref 0–0.04)
IMM GRANULOCYTES # BLD AUTO: 0.07 K/UL (ref 0–0.04)
IMM GRANULOCYTES NFR BLD AUTO: 0.3 % (ref 0–0.5)
IMM GRANULOCYTES NFR BLD AUTO: 0.4 % (ref 0–0.5)
INTERVENTRICULAR SEPTUM: 1 CM (ref 0.6–1.1)
IVC DIAMETER: 1.93 CM
LEFT ATRIUM AREA SYSTOLIC (APICAL 2 CHAMBER): 18.45 CM2
LEFT ATRIUM AREA SYSTOLIC (APICAL 4 CHAMBER): 14.48 CM2
LEFT ATRIUM VOLUME INDEX MOD: 22 ML/M2
LEFT ATRIUM VOLUME MOD: 44 ML
LEFT INTERNAL DIMENSION IN SYSTOLE: 3.7 CM (ref 2.1–4)
LEFT VENTRICLE DIASTOLIC VOLUME INDEX: 51.02 ML/M2
LEFT VENTRICLE DIASTOLIC VOLUME: 100 ML
LEFT VENTRICLE END DIASTOLIC VOLUME APICAL 2 CHAMBER: 43.98 ML
LEFT VENTRICLE END DIASTOLIC VOLUME APICAL 4 CHAMBER: 56.08 ML
LEFT VENTRICLE END SYSTOLIC VOLUME APICAL 2 CHAMBER: 54.35 ML
LEFT VENTRICLE END SYSTOLIC VOLUME APICAL 4 CHAMBER: 33.37 ML
LEFT VENTRICLE MASS INDEX: 78.3 G/M2
LEFT VENTRICLE SYSTOLIC VOLUME INDEX: 28.6 ML/M2
LEFT VENTRICLE SYSTOLIC VOLUME: 56 ML
LEFT VENTRICULAR INTERNAL DIMENSION IN DIASTOLE: 4.7 CM (ref 3.5–6)
LEFT VENTRICULAR MASS: 153.4 G
LV LATERAL E/E' RATIO: 7.9 M/S
LV SEPTAL E/E' RATIO: 11 M/S
LVED V (TEICH): 99.78 ML
LVES V (TEICH): 56.44 ML
LVOT MG: 2.76 MMHG
LVOT MV: 0.8 CM/S
LYMPHOCYTES # BLD AUTO: 1.35 K/UL (ref 1–4.8)
LYMPHOCYTES # BLD AUTO: 1.71 K/UL (ref 1–4.8)
MCH RBC QN AUTO: 24 PG (ref 27–31)
MCH RBC QN AUTO: 28.6 PG (ref 27–31)
MCHC RBC AUTO-ENTMCNC: 29.8 G/DL (ref 32–36)
MCHC RBC AUTO-ENTMCNC: 33.7 G/DL (ref 32–36)
MCV RBC AUTO: 81 FL (ref 82–98)
MCV RBC AUTO: 85 FL (ref 82–98)
MV PEAK A VEL: 0.68 M/S
MV PEAK E VEL: 0.55 M/S
MV PEAK GRADIENT: 2 MMHG
MV STENOSIS PRESSURE HALF TIME: 67.23 MS
MV VALVE AREA BY CONTINUITY EQUATION: 3.39 CM2
MV VALVE AREA P 1/2 METHOD: 3.27 CM2
NUCLEATED RBC (/100WBC) (OHS): 0 /100 WBC
NUCLEATED RBC (/100WBC) (OHS): 0 /100 WBC
PISA MRMAX VEL: 2.98 M/S
PISA TR MAX VEL: 2.1 M/S
PLATELET # BLD AUTO: 157 K/UL (ref 150–450)
PLATELET # BLD AUTO: 287 K/UL (ref 150–450)
PLATELET BLD QL SMEAR: NORMAL
PMV BLD AUTO: 10 FL (ref 9.2–12.9)
PMV BLD AUTO: 11.1 FL (ref 9.2–12.9)
POCT GLUCOSE: 177 MG/DL (ref 70–110)
POCT GLUCOSE: 184 MG/DL (ref 70–110)
POCT GLUCOSE: 226 MG/DL (ref 70–110)
POTASSIUM SERPL-SCNC: 3.5 MMOL/L (ref 3.5–5.1)
POTASSIUM SERPL-SCNC: 3.6 MMOL/L (ref 3.5–5.1)
POTASSIUM SERPL-SCNC: 5.1 MMOL/L (ref 3.5–5.1)
PROT SERPL-MCNC: 6.7 GM/DL (ref 6–8.4)
PROT SERPL-MCNC: 7.6 GM/DL (ref 6–8.4)
PULM VEIN S/D RATIO: 1.56
PV PEAK D VEL: 0.32 M/S
PV PEAK S VEL: 0.5 M/S
RA PRESSURE ESTIMATED: 3 MMHG
RA VOL SYS: 25.29 ML
RBC # BLD AUTO: 4.12 M/UL (ref 4–5.4)
RBC # BLD AUTO: 4.4 M/UL (ref 4–5.4)
RELATIVE EOSINOPHIL (OHS): 10.2 %
RELATIVE EOSINOPHIL (OHS): 2.3 %
RELATIVE LYMPHOCYTE (OHS): 28.5 % (ref 18–48)
RELATIVE LYMPHOCYTE (OHS): 8.6 % (ref 18–48)
RELATIVE MONOCYTE (OHS): 10 % (ref 4–15)
RELATIVE MONOCYTE (OHS): 6.3 % (ref 4–15)
RELATIVE NEUTROPHIL (OHS): 57.7 % (ref 38–73)
RELATIVE NEUTROPHIL (OHS): 73.5 % (ref 38–73)
RIGHT ATRIAL AREA: 11.7 CM2
RIGHT ATRIUM END SYSTOLIC VOLUME APICAL 4 CHAMBER INDEX BSA: 12.36 ML/M2
RIGHT ATRIUM VOLUME AREA LENGTH APICAL 4 CHAMBER: 24.22 ML
RV TB RVSP: 5 MMHG
RV TISSUE DOPPLER FREE WALL SYSTOLIC VELOCITY 1 (APICAL 4 CHAMBER VIEW): 12 CM/S
SINUS: 2.91 CM
SODIUM SERPL-SCNC: 139 MMOL/L (ref 136–145)
SODIUM SERPL-SCNC: 141 MMOL/L (ref 136–145)
SODIUM SERPL-SCNC: 142 MMOL/L (ref 136–145)
STJ: 3 CM
TDI LATERAL: 0.07 M/S
TDI SEPTAL: 0.05 M/S
TDI: 0.06 M/S
TR MAX PG: 17 MMHG
TRICUSPID ANNULAR PLANE SYSTOLIC EXCURSION: 1.8 CM
TROPONIN I SERPL DL<=0.01 NG/ML-MCNC: 0.01 NG/ML
TROPONIN I SERPL DL<=0.01 NG/ML-MCNC: 1.17 NG/ML
TV REST PULMONARY ARTERY PRESSURE: 21 MMHG
WBC # BLD AUTO: 15.72 K/UL (ref 3.9–12.7)
WBC # BLD AUTO: 6 K/UL (ref 3.9–12.7)
Z-SCORE OF LEFT VENTRICULAR DIMENSION IN END DIASTOLE: -1.77
Z-SCORE OF LEFT VENTRICULAR DIMENSION IN END SYSTOLE: 0.55

## 2025-06-13 PROCEDURE — 25000003 PHARM REV CODE 250: Performed by: NURSE PRACTITIONER

## 2025-06-13 PROCEDURE — 99214 OFFICE O/P EST MOD 30 MIN: CPT | Mod: 25,ICN,, | Performed by: INTERNAL MEDICINE

## 2025-06-13 PROCEDURE — 82040 ASSAY OF SERUM ALBUMIN: CPT | Performed by: INTERNAL MEDICINE

## 2025-06-13 PROCEDURE — 36415 COLL VENOUS BLD VENIPUNCTURE: CPT | Performed by: NURSE PRACTITIONER

## 2025-06-13 PROCEDURE — 85025 COMPLETE CBC W/AUTO DIFF WBC: CPT | Performed by: NURSE PRACTITIONER

## 2025-06-13 PROCEDURE — 25500020 PHARM REV CODE 255: Performed by: INTERNAL MEDICINE

## 2025-06-13 PROCEDURE — 63600175 PHARM REV CODE 636 W HCPCS: Performed by: NURSE PRACTITIONER

## 2025-06-13 PROCEDURE — 36415 COLL VENOUS BLD VENIPUNCTURE: CPT | Performed by: INTERNAL MEDICINE

## 2025-06-13 PROCEDURE — C1769 GUIDE WIRE: HCPCS | Performed by: INTERNAL MEDICINE

## 2025-06-13 PROCEDURE — 93458 L HRT ARTERY/VENTRICLE ANGIO: CPT | Performed by: INTERNAL MEDICINE

## 2025-06-13 PROCEDURE — 84484 ASSAY OF TROPONIN QUANT: CPT | Performed by: NURSE PRACTITIONER

## 2025-06-13 PROCEDURE — C1894 INTRO/SHEATH, NON-LASER: HCPCS | Performed by: INTERNAL MEDICINE

## 2025-06-13 PROCEDURE — G0378 HOSPITAL OBSERVATION PER HR: HCPCS

## 2025-06-13 PROCEDURE — 36415 COLL VENOUS BLD VENIPUNCTURE: CPT | Mod: XB | Performed by: NURSE PRACTITIONER

## 2025-06-13 PROCEDURE — 99152 MOD SED SAME PHYS/QHP 5/>YRS: CPT | Mod: ICN,,, | Performed by: INTERNAL MEDICINE

## 2025-06-13 PROCEDURE — 99152 MOD SED SAME PHYS/QHP 5/>YRS: CPT | Performed by: INTERNAL MEDICINE

## 2025-06-13 PROCEDURE — 93458 L HRT ARTERY/VENTRICLE ANGIO: CPT | Mod: 26,ICN,, | Performed by: INTERNAL MEDICINE

## 2025-06-13 PROCEDURE — 94761 N-INVAS EAR/PLS OXIMETRY MLT: CPT

## 2025-06-13 PROCEDURE — 80053 COMPREHEN METABOLIC PANEL: CPT | Performed by: NURSE PRACTITIONER

## 2025-06-13 PROCEDURE — 93010 ELECTROCARDIOGRAM REPORT: CPT | Mod: XE,ICN,, | Performed by: INTERNAL MEDICINE

## 2025-06-13 PROCEDURE — 99900035 HC TECH TIME PER 15 MIN (STAT)

## 2025-06-13 PROCEDURE — 63600175 PHARM REV CODE 636 W HCPCS: Performed by: INTERNAL MEDICINE

## 2025-06-13 PROCEDURE — 93005 ELECTROCARDIOGRAM TRACING: CPT

## 2025-06-13 PROCEDURE — 84484 ASSAY OF TROPONIN QUANT: CPT | Mod: 91 | Performed by: INTERNAL MEDICINE

## 2025-06-13 PROCEDURE — C1887 CATHETER, GUIDING: HCPCS | Performed by: INTERNAL MEDICINE

## 2025-06-13 PROCEDURE — 96372 THER/PROPH/DIAG INJ SC/IM: CPT | Performed by: NURSE PRACTITIONER

## 2025-06-13 PROCEDURE — 82550 ASSAY OF CK (CPK): CPT | Performed by: NURSE PRACTITIONER

## 2025-06-13 PROCEDURE — 25000003 PHARM REV CODE 250: Performed by: INTERNAL MEDICINE

## 2025-06-13 PROCEDURE — 85025 COMPLETE CBC W/AUTO DIFF WBC: CPT | Mod: 91 | Performed by: INTERNAL MEDICINE

## 2025-06-13 RX ORDER — HEPARIN SODIUM 200 [USP'U]/100ML
INJECTION, SOLUTION INTRAVENOUS
Status: DISCONTINUED | OUTPATIENT
Start: 2025-06-13 | End: 2025-06-14 | Stop reason: HOSPADM

## 2025-06-13 RX ORDER — MIDAZOLAM HYDROCHLORIDE 1 MG/ML
INJECTION, SOLUTION INTRAMUSCULAR; INTRAVENOUS
Status: DISCONTINUED | OUTPATIENT
Start: 2025-06-13 | End: 2025-06-13 | Stop reason: HOSPADM

## 2025-06-13 RX ORDER — SODIUM CHLORIDE 9 MG/ML
INJECTION, SOLUTION INTRAVENOUS CONTINUOUS
Status: DISCONTINUED | OUTPATIENT
Start: 2025-06-13 | End: 2025-06-13

## 2025-06-13 RX ORDER — CLOPIDOGREL BISULFATE 75 MG/1
600 TABLET ORAL ONCE
Status: DISCONTINUED | OUTPATIENT
Start: 2025-06-13 | End: 2025-06-13

## 2025-06-13 RX ORDER — CLOPIDOGREL BISULFATE 75 MG/1
75 TABLET ORAL ONCE
Status: COMPLETED | OUTPATIENT
Start: 2025-06-13 | End: 2025-06-13

## 2025-06-13 RX ORDER — CLOPIDOGREL BISULFATE 75 MG/1
525 TABLET ORAL ONCE
Status: COMPLETED | OUTPATIENT
Start: 2025-06-13 | End: 2025-06-13

## 2025-06-13 RX ORDER — FENTANYL CITRATE 50 UG/ML
INJECTION, SOLUTION INTRAMUSCULAR; INTRAVENOUS
Status: DISCONTINUED | OUTPATIENT
Start: 2025-06-13 | End: 2025-06-13 | Stop reason: HOSPADM

## 2025-06-13 RX ORDER — ASPIRIN 81 MG/1
81 TABLET ORAL DAILY
Status: DISCONTINUED | OUTPATIENT
Start: 2025-06-13 | End: 2025-06-14 | Stop reason: HOSPADM

## 2025-06-13 RX ORDER — VERAPAMIL HYDROCHLORIDE 2.5 MG/ML
INJECTION INTRAVENOUS
Status: DISCONTINUED | OUTPATIENT
Start: 2025-06-13 | End: 2025-06-13 | Stop reason: HOSPADM

## 2025-06-13 RX ORDER — ASPIRIN 325 MG
325 TABLET ORAL ONCE
Status: COMPLETED | OUTPATIENT
Start: 2025-06-13 | End: 2025-06-13

## 2025-06-13 RX ORDER — CLOPIDOGREL BISULFATE 75 MG/1
75 TABLET ORAL DAILY
Status: DISCONTINUED | OUTPATIENT
Start: 2025-06-14 | End: 2025-06-13

## 2025-06-13 RX ORDER — IODIXANOL 320 MG/ML
INJECTION, SOLUTION INTRAVASCULAR
Status: DISCONTINUED | OUTPATIENT
Start: 2025-06-13 | End: 2025-06-13 | Stop reason: HOSPADM

## 2025-06-13 RX ORDER — LIDOCAINE HYDROCHLORIDE 10 MG/ML
INJECTION, SOLUTION EPIDURAL; INFILTRATION; INTRACAUDAL; PERINEURAL
Status: DISCONTINUED | OUTPATIENT
Start: 2025-06-13 | End: 2025-06-13 | Stop reason: HOSPADM

## 2025-06-13 RX ADMIN — ATORVASTATIN CALCIUM 80 MG: 40 TABLET, FILM COATED ORAL at 08:06

## 2025-06-13 RX ADMIN — AMLODIPINE BESYLATE 5 MG: 5 TABLET ORAL at 08:06

## 2025-06-13 RX ADMIN — INSULIN ASPART 2 UNITS: 100 INJECTION, SOLUTION INTRAVENOUS; SUBCUTANEOUS at 08:06

## 2025-06-13 RX ADMIN — HYDROCHLOROTHIAZIDE 25 MG: 25 TABLET ORAL at 08:06

## 2025-06-13 RX ADMIN — CLOPIDOGREL 525 MG: 75 TABLET ORAL at 11:06

## 2025-06-13 RX ADMIN — ENOXAPARIN SODIUM 40 MG: 40 INJECTION SUBCUTANEOUS at 06:06

## 2025-06-13 RX ADMIN — METOPROLOL TARTRATE 50 MG: 50 TABLET, FILM COATED ORAL at 08:06

## 2025-06-13 RX ADMIN — SODIUM CHLORIDE: 0.9 INJECTION, SOLUTION INTRAVENOUS at 04:06

## 2025-06-13 RX ADMIN — INSULIN ASPART 2 UNITS: 100 INJECTION, SOLUTION INTRAVENOUS; SUBCUTANEOUS at 06:06

## 2025-06-13 RX ADMIN — ASPIRIN 325 MG ORAL TABLET 325 MG: 325 PILL ORAL at 11:06

## 2025-06-13 RX ADMIN — INSULIN ASPART 2 UNITS: 100 INJECTION, SOLUTION INTRAVENOUS; SUBCUTANEOUS at 11:06

## 2025-06-13 RX ADMIN — CLOPIDOGREL 75 MG: 75 TABLET ORAL at 01:06

## 2025-06-13 NOTE — ED NOTES
Patient bed ready. Requested skeleton admit orders from ED provider so patient can be moved upstairs

## 2025-06-13 NOTE — SUBJECTIVE & OBJECTIVE
Past Medical History:   Diagnosis Date    Diabetes mellitus, type 2     Hypertension        History reviewed. No pertinent surgical history.    Review of patient's allergies indicates:  No Known Allergies    No current facility-administered medications on file prior to encounter.     Current Outpatient Medications on File Prior to Encounter   Medication Sig    amLODIPine (NORVASC) 5 MG tablet Take 1 tablet (5 mg total) by mouth once daily.    atorvastatin (LIPITOR) 80 MG tablet Take 1 tablet (80 mg total) by mouth once daily.    BLOOD PRESSURE CUFF Misc 1 kit by Misc.(Non-Drug; Combo Route) route once daily.    blood sugar diagnostic Strp To check BG 4 times daily, to use with insurance preferred meter (Patient not taking: Reported on 6/12/2025)    blood-glucose meter kit To check BG 4 times daily, to use with insurance preferred meter    diclofenac sodium (VOLTAREN ARTHRITIS PAIN) 1 % Gel Apply 2 g topically once daily. (Patient not taking: Reported on 6/12/2025)    gabapentin (NEURONTIN) 300 MG capsule Take 1 capsule (300 mg total) by mouth 3 (three) times daily. (Patient not taking: Reported on 6/12/2025)    glipiZIDE (GLUCOTROL) 10 MG tablet Take 10 mg by mouth 2 (two) times daily.    hydroCHLOROthiazide (HYDRODIURIL) 25 MG tablet Take 1 tablet (25 mg total) by mouth once daily.    hydrOXYzine pamoate (VISTARIL) 25 MG Cap Take 1 capsule (25 mg total) by mouth every 8 (eight) hours as needed. (Patient not taking: Reported on 6/12/2025)    lancets (LANCETS,THIN) Misc Use to test BG twice daily. (Patient not taking: Reported on 6/12/2025)    LIDOcaine (LIDODERM) 5 % Place 1 patch onto the skin once daily. Remove & Discard patch within 12 hours or as directed by MD (Patient not taking: Reported on 6/12/2025)    lisinopriL (PRINIVIL,ZESTRIL) 40 MG tablet Take 1 tablet (40 mg total) by mouth once daily.    metFORMIN (GLUCOPHAGE) 1000 MG tablet Take 1 tablet (1,000 mg total) by mouth 2 (two) times daily.    metoprolol  tartrate (LOPRESSOR) 50 MG tablet Take 1 tablet (50 mg total) by mouth 2 (two) times daily.    miscellaneous medical supply Kit Toilet seat with safety rails    naproxen (NAPROSYN) 500 MG tablet Take 1 tablet (500 mg total) by mouth 2 (two) times daily with meals. (Patient taking differently: Take 500 mg by mouth as needed.)    thiamine (VITAMIN B-1) 50 MG tablet Take 50 mg by mouth once daily.     Family History       Problem Relation (Age of Onset)    Breast cancer Sister, Sister          Tobacco Use    Smoking status: Never    Smokeless tobacco: Never   Substance and Sexual Activity    Alcohol use: No    Drug use: No    Sexual activity: Not Currently     Partners: Male     Review of Systems   Constitutional: Negative.    HENT: Negative.     Eyes: Negative.    Respiratory:  Positive for shortness of breath.    Cardiovascular:  Positive for chest pain.   Gastrointestinal: Negative.    Endocrine: Negative.    Genitourinary: Negative.    Musculoskeletal: Negative.    Skin: Negative.    Allergic/Immunologic: Negative.    Neurological: Negative.    Hematological: Negative.    Psychiatric/Behavioral: Negative.       Objective:     Vital Signs (Most Recent):  Temp: 97.6 °F (36.4 °C) (06/12/25 2024)  Pulse: 87 (06/12/25 2024)  Resp: 18 (06/12/25 2024)  BP: 133/73 (06/12/25 2024)  SpO2: 97 % (06/12/25 2024) Vital Signs (24h Range):  Temp:  [97.5 °F (36.4 °C)-97.7 °F (36.5 °C)] 97.6 °F (36.4 °C)  Pulse:  [80-92] 87  Resp:  [16-20] 18  SpO2:  [97 %-100 %] 97 %  BP: ()/(60-85) 133/73     Weight: 89.8 kg (198 lb)  Body mass index is 35.07 kg/m².     Physical Exam  Constitutional:       Appearance: Normal appearance.   HENT:      Head: Normocephalic and atraumatic.      Right Ear: Tympanic membrane normal.      Nose: Nose normal.   Cardiovascular:      Rate and Rhythm: Normal rate and regular rhythm.      Pulses: Normal pulses.   Pulmonary:      Effort: Pulmonary effort is normal.      Breath sounds: Normal breath sounds.    Abdominal:      General: Abdomen is flat. Bowel sounds are normal.      Palpations: Abdomen is soft.   Musculoskeletal:         General: Normal range of motion.      Cervical back: Normal range of motion and neck supple.   Skin:     General: Skin is warm and dry.      Capillary Refill: Capillary refill takes less than 2 seconds.   Neurological:      General: No focal deficit present.      Mental Status: She is alert.   Psychiatric:         Mood and Affect: Mood normal.                Significant Labs: All pertinent labs within the past 24 hours have been reviewed.  Recent Lab Results         06/12/25  1506        Albumin 4.2       ALP 48       ALT 12       Anion Gap 13       AST 13       Baso # 0.07       Basophil % 0.9       BILIRUBIN TOTAL 0.8  Comment: For infants and newborns, interpretation of results should be based   on gestational age, weight and in agreement with clinical   observations.    Premature Infant recommended reference ranges:   0-24 hours:  <8.0 mg/dL   24-48 hours: <12.0 mg/dL   3-5 days:    <15.0 mg/dL   6-29 days:   <15.0 mg/dL       BNP 25  Comment: Values of less than 100 pg/ml are consistent with non-CHF populations.        BUN 26       Calcium 10.0       Chloride 103       CO2 25       Creatinine 1.2       D-Dimer 0.41  Comment: The quantitative D-dimer assay should be used as an aid in the diagnosis of deep vein thrombosis and pulmonary embolism in patients with the appropriate presentation and clinical history. The upper limit of the reference interval and the clinical cut off point are identical. Causes of a positive (>0.50 mg/L FEU) D-Dimer test include, but are not limited to: DVT, PE, DIC, thrombolytic therapy, anticoagulant therapy, recent surgery, trauma, or pregnancy, disseminated malignancy, aortic aneurysm, cirrhosis, and severe infection. False negative results may occur in patients with distal DVT.       eGFR 50  Comment: Estimated GFR calculated using the CKD-EPI creatinine  (2021) equation.       Eos # 0.08       Eos % 1.1       Glucose 192       Gran # (ANC) 5.10       Hematocrit 40.5       Hemoglobin 13.7       Immature Grans (Abs) 0.01  Comment: Mild elevation in immature granulocytes is non specific and can be seen in a variety of conditions including stress response, acute inflammation, trauma and pregnancy. Correlation with other laboratory and clinical findings is essential.       Immature Granulocytes 0.1       Lymph # 1.74       Lymph % 23.0       MCH 28.5       MCHC 33.8       MCV 84       Mono # 0.57       Mono % 7.5       MPV 11.7       Neut % 67.4       nRBC 0       Platelet Count 217       Potassium 3.5       PROTEIN TOTAL 7.8       RBC 4.81       RDW 12.4       Sodium 141       Troponin I 0.008  Comment: The reference interval for Troponin I represents the 99th percentile cutoff for our facility and is consistent with 3rd generation assay performance.       WBC 7.57               Significant Imaging: I have reviewed all pertinent imaging results/findings within the past 24 hours.  I have reviewed and interpreted all pertinent imaging results/findings within the past 24 hours.

## 2025-06-13 NOTE — ASSESSMENT & PLAN NOTE
- SBP 90s-130s  - on Norvasc 5 HCTZ 25 Metoprolol 50 BID- continued while admitted   - goal BP less than 130/80  - BP well controlled

## 2025-06-13 NOTE — BRIEF OP NOTE
"[facility] - Cath Lab ([facility])  Surgery Department  Operative Note    SUMMARY   POST CATH NOTE    Date of Procedure: 6/13/2025     Procedure: Procedure(s) (LRB):  Left heart cath (Left)  ANGIOGRAM, CORONARY ARTERY (N/A)     Surgeons and Role:     * Julito Tirado MD - Primary    Assisting Surgeon: None    Pre-Operative Diagnosis: Unstable angina [I20.0]    Post-Operative Diagnosis: Post-Op Diagnosis Codes:     * Unstable angina [I20.0]    Description of Pre-Procedure(s): Prior to cardiac catheterization, the procedure was discussed at length with the patient including a comprehensive list of risks, benefits and alternatives.  Patient was informed of all possible complications and verbally acknowledged that they understood and the patient decided to proceed with cardiac catheterization.  Expressed written consent was confirmed in chart prior to beginning the procedure.  Myself along with the scrub tech and the nurse entered the room.  A formal timeout was performed in which patient was identified by name, date of birth, medical record number and procedure being performed; all agreed.  The patient was prepped and draped in the usual sterile fashion.  Conscious sedation was subsequently ordered and administered by a nurse specifically trained in conscious sedation. Please see attached procedure log for specifics regarding approach/equipment.   s/p catheterization secondary to:     Cath Results:  Access:  Right radial with 4-5 German sheath  LM:  SALBADOR III flow 0% stenosis  LAD: SALBADOR III flow mild luminal irregularities  LCx:  SALBADOR III flow mild luminal irregularities  RCA: SALBADOR III flow ostial 20% stenosis  Dominance right    LVgram: LVEDP 7 mm Hg    Intervention:  None  Closure device:  Vasc band  Patient tolerated procedure well, no complications    Post Cath Exam:  /78   Pulse 73   Temp 97.5 °F (36.4 °C) (Oral)   Resp 20   Ht 5' 3" (1.6 m)   Wt 93.9 kg (207 lb)   LMP  (LMP Unknown)   SpO2 96%   BMI " 36.67 kg/m²     Anesthesia: RN IV Sedation      Estimated Blood Loss (EBL): * No values recorded between 6/13/2025 12:00 AM and 6/13/2025  3:52 PM *           Specimens:   Specimen (24h ago, onward)      None               Assessment:   Minimal nonobstructive coronary artery disease    Plan:   Medical therapy and risk factors reduction  Okay to discharge home from cardiac standpoint and follow up as an outpatient

## 2025-06-13 NOTE — ASSESSMENT & PLAN NOTE
Body mass index is 35.07 kg/m². Morbid obesity complicates all aspects of disease management from diagnostic modalities to treatment. Weight loss encouraged and health benefits explained to patient.

## 2025-06-13 NOTE — HPI
68yo female with DMII, HLP, HTN, obesity and LOPEZ who presented to ER upon advice of cardiology clinic due to low BP and hypotension. She was seen yesterday by Dr Tirado in clinic for evaluation of SOB and was noted to be hypotensive therefore she was sent to the ER for evaluation. She reports fatigue for the past month described as inability to perform ADLS. She then began noticing SOB with minimal activity described as an inability to catch her breath and one time felt really poorly almost similar to feeling of impending doom. She also reports chest pain described as a heaviness with no associated symptoms. She also reports falling recently but denies any syncope or LOC. Labs CBC WNL BMP with creatinine 1.1 Troponin negative x4 EKg with NSR with non specific TWA to inferolateral leads CPK 51. Admitted to [facility] and Cardiology consulted for evaluation of SOB

## 2025-06-13 NOTE — PROGRESS NOTES
06/12/25 2148   Admission   Initial VN Admission Questions Complete   Shift   Virtual Nurse - Patient Verbalized Approval Of Camera Use;VN Rounding   Safety/Activity   Patient Rounds bed in low position;call light in patient/parent reach;clutter free environment maintained;visualized patient;placement of personal items at bedside   Safety Promotion/Fall Prevention assistive device/personal item within reach;Fall Risk reviewed with patient/family;side rails raised x 2   Positioning   Body Position supine   Head of Bed (HOB) Positioning HOB at 30-45 degrees     VN cued in to pt's room with permission. Admission questions completed. Plan of care reviewed with pt. Pt denies any needs at this time. Call bell w/in reach. Instructed to call for needs/assist oob.     Care plan initiated

## 2025-06-13 NOTE — PLAN OF CARE
06/13/25 1354   Discharge Planning   Assessment Type Discharge Planning Brief Assessment   Support Systems Children   Equipment Currently Used at Home shower chair;bedside commode;walker, standard;other (see comments)  (rollator, but doesn't really use)   Current Living Arrangements home   Patient/Family Anticipates Transition to home   Patient/Family Anticipated Services at Transition none   DME Needed Upon Discharge  none   Discharge Plan A Home     Pt lives alone. DME listed above, not current with HH. Pt's daughter will provide transport upon discharge.

## 2025-06-13 NOTE — PLAN OF CARE
Remaining air removed from right radial vasc band. Gauze and tegaderm dressing applied c.d.i. No drainage or shadowing noted. No bleeding or hematoma noted around site. +2 keena radial pulses palpated. Skin normal in color, warm to touch, < 3 sec cap refill.   Pt tolerated well.  Will continue to monitor pt.

## 2025-06-13 NOTE — CONSULTS
[facility] Surg  Cardiology  Consult Note    Patient Name: Nam William  MRN: [id number]  Admission Date: 6/12/2025  Hospital Length of Stay: 0 days  Code Status: Full Code   Attending Provider: Bhumi Hill*   Consulting Provider: JENNIE Nazario ANP  Primary Care Physician: Alejandro Tripp MD  Principal Problem:Dyspnea on exertion    Patient information was obtained from patient, past medical records, and ER records.     Inpatient consult to Cardiology  Consult performed by: Justine Parrish APRN, ANP  Consult ordered by: Radha Tillman NP  Reason for consult: LOPEZ        Subjective:     Chief Complaint:  LOPEZ      HPI:   68yo female with DMII, HLP, HTN, obesity and LOPEZ who presented to ER upon advice of cardiology clinic due to low BP and hypotension. She was seen yesterday by Dr Tirado in clinic for evaluation of SOB and was noted to be hypotensive therefore she was sent to the ER for evaluation. She reports fatigue for the past month described as inability to perform ADLS. She then began noticing SOB with minimal activity described as an inability to catch her breath and one time felt really poorly almost similar to feeling of impending doom. She also reports chest pain described as a heaviness with no associated symptoms. She also reports falling recently but denies any syncope or LOC. Labs CBC WNL BMP with creatinine 1.1 Troponin negative x4 EKg with NSR with non specific TWA to inferolateral leads CPK 51. Admitted to [facility] Medicine and Cardiology consulted for evaluation of SOB    Past Medical History:   Diagnosis Date    Diabetes mellitus, type 2     Hypertension        History reviewed. No pertinent surgical history.    Review of patient's allergies indicates:  No Known Allergies    No current facility-administered medications on file prior to encounter.     Current Outpatient Medications on File Prior to Encounter   Medication Sig    amLODIPine (NORVASC) 5 MG tablet  Take 1 tablet (5 mg total) by mouth once daily.    atorvastatin (LIPITOR) 80 MG tablet Take 1 tablet (80 mg total) by mouth once daily. (Patient taking differently: Take 40 mg by mouth once daily.)    blood-glucose meter kit To check BG 4 times daily, to use with insurance preferred meter    cyanocobalamin (VITAMIN B-12) 1000 MCG tablet Take 1,000 mcg by mouth once daily.    glipiZIDE (GLUCOTROL) 10 MG tablet Take 10 mg by mouth 2 (two) times daily.    hydrOXYzine pamoate (VISTARIL) 25 MG Cap Take 1 capsule (25 mg total) by mouth every 8 (eight) hours as needed.    lisinopriL (PRINIVIL,ZESTRIL) 40 MG tablet Take 1 tablet (40 mg total) by mouth once daily.    metFORMIN (GLUCOPHAGE) 1000 MG tablet Take 1 tablet (1,000 mg total) by mouth 2 (two) times daily.    metoprolol tartrate (LOPRESSOR) 50 MG tablet Take 1 tablet (50 mg total) by mouth 2 (two) times daily.    BLOOD PRESSURE CUFF Misc 1 kit by Misc.(Non-Drug; Combo Route) route once daily.    blood sugar diagnostic Strp To check BG 4 times daily, to use with insurance preferred meter (Patient not taking: Reported on 6/12/2025)    diclofenac sodium (VOLTAREN ARTHRITIS PAIN) 1 % Gel Apply 2 g topically once daily. (Patient not taking: Reported on 6/12/2025)    gabapentin (NEURONTIN) 300 MG capsule Take 1 capsule (300 mg total) by mouth 3 (three) times daily. (Patient not taking: Reported on 6/12/2025)    hydroCHLOROthiazide (HYDRODIURIL) 25 MG tablet Take 1 tablet (25 mg total) by mouth once daily.    lancets (LANCETS,THIN) Misc Use to test BG twice daily. (Patient not taking: Reported on 6/12/2025)    LIDOcaine (LIDODERM) 5 % Place 1 patch onto the skin once daily. Remove & Discard patch within 12 hours or as directed by MD (Patient not taking: Reported on 6/12/2025)    miscellaneous medical supply Kit Toilet seat with safety rails    naproxen (NAPROSYN) 500 MG tablet Take 1 tablet (500 mg total) by mouth 2 (two) times daily with meals. (Patient taking differently:  Take 500 mg by mouth as needed.)    thiamine (VITAMIN B-1) 50 MG tablet Take 50 mg by mouth once daily.     Family History       Problem Relation (Age of Onset)    Breast cancer Sister, Sister          Tobacco Use    Smoking status: Never    Smokeless tobacco: Never   Substance and Sexual Activity    Alcohol use: No    Drug use: No    Sexual activity: Not Currently     Partners: Male     Review of Systems   Constitutional: Positive for malaise/fatigue. Negative for chills, decreased appetite, diaphoresis and fever.   Cardiovascular:  Positive for chest pain and dyspnea on exertion. Negative for claudication, cyanosis, irregular heartbeat, leg swelling, near-syncope, orthopnea, palpitations, paroxysmal nocturnal dyspnea and syncope.   Respiratory:  Negative for cough, hemoptysis, shortness of breath and wheezing.    Gastrointestinal:  Negative for bloating, abdominal pain, constipation, diarrhea, melena, nausea and vomiting.   Neurological:  Negative for dizziness and weakness.     Objective:     Vital Signs (Most Recent):  Temp: 97.3 °F (36.3 °C) (06/13/25 0811)  Pulse: 72 (06/13/25 0811)  Resp: 20 (06/13/25 0811)  BP: 124/78 (06/13/25 0811)  SpO2: 99 % (06/13/25 0811) Vital Signs (24h Range):  Temp:  [97.3 °F (36.3 °C)-97.7 °F (36.5 °C)] 97.3 °F (36.3 °C)  Pulse:  [68-92] 72  Resp:  [16-20] 20  SpO2:  [95 %-100 %] 99 %  BP: ()/(55-85) 124/78     Weight: 93.9 kg (207 lb)  Body mass index is 36.67 kg/m².    SpO2: 99 %       No intake or output data in the 24 hours ending 06/13/25 1151    Lines/Drains/Airways       Peripheral Intravenous Line  Duration                  Peripheral IV - Single Lumen 06/12/25 1600 22 G Anterior;Right Forearm <1 day                     Physical Exam  Constitutional:       General: She is not in acute distress.     Appearance: She is well-developed.   Cardiovascular:      Rate and Rhythm: Normal rate and regular rhythm.      Heart sounds: No murmur heard.     No gallop.   Pulmonary:       Effort: Pulmonary effort is normal. No respiratory distress.      Breath sounds: Normal breath sounds. No wheezing.   Abdominal:      General: Bowel sounds are normal. There is no distension.      Palpations: Abdomen is soft.      Tenderness: There is no abdominal tenderness.   Skin:     General: Skin is warm and dry.   Neurological:      Mental Status: She is alert and oriented to person, place, and time.              Significant Imaging: Echocardiogram: Transthoracic echo (TTE) complete ([facility]upid Only):   Results for orders placed or performed during the hospital encounter of 06/12/25   Echo   Result Value Ref Range    BSA 2.04 m2    A2C EF 37 %    A4C EF 58 %    LVOT stroke volume 69.9 cm3    LVIDd 4.7 3.5 - 6.0 cm    LV Systolic Volume 56 mL    LV Systolic Volume Index 28.6 mL/m2    LVIDs 3.7 2.1 - 4.0 cm    LV ESV A2C 54.35 mL    LV Diastolic Volume 100 mL    LV ESV A4C 33.37 mL    LV Diastolic Volume Index 51.02 mL/m2    LV EDV A2C [id number] mL    LV EDV A4C 56.08 mL    Left Ventricular End Systolic Volume by Teichholz Method 56.44 mL    Left Ventricular End Diastolic Volume by Teichholz Method 99.78 mL    IVS 1.0 0.6 - 1.1 cm    LVOT diameter 2.1 cm    LVOT area 3.5 cm2    FS 21.3 (A) 28 - 44 %    Left Ventricle Relative Wall Thickness 0.38 cm    PW 0.9 0.6 - 1.1 cm    LV mass 153.4 g    LV Mass Index 78.3 g/m2    MV Peak E Epifanio 0.55 m/s    TDI LATERAL 0.07 m/s    TDI SEPTAL 0.05 m/s    E/E' ratio 9 m/s    MV Peak A Epifanio 0.68 m/s    TR Max Epifanio 2.1 m/s    E/A ratio 0.81     E wave deceleration time 232 msec    LV SEPTAL E/E' RATIO 11.0 m/s    LV LATERAL E/E' RATIO 7.9 m/s    PV Peak S Epifanio 0.50 m/s    PV Peak D Epifanio 0.32 m/s    Pulm vein S/D ratio 1.56     LVOT peak epifanio 1.1 m/s    Left Ventricular Outflow Tract Mean Velocity 0.80 cm/s    Left Ventricular Outflow Tract Mean Gradient 2.76 mmHg    RV S' 12.00 cm/s    TAPSE 1.8 cm    LA Vol (MOD) 44 mL    ADELINE (MOD) 22 mL/m2    RA area length vol 24.22 mL     RA Area 11.7 cm2    RA vol index 12.36 mL/m2    RA Vol 25.29 mL    AV mean gradient 5 mmHg    AV peak gradient 8 mmHg    Ao peak giselle 1.4 m/s    Ao VTI 25.5 cm    LVOT peak VTI 20.2 cm    AV valve area 2.7 cm²    AV Velocity Ratio 0.79     AV index (prosthetic) 0.79     ARMYA by Velocity Ratio 2.7 cm²    Mr max giselle 2.98 m/s    MV peak gradient 2 mmHg    MV stenosis pressure 1/2 time 67.23 ms    MV valve area p 1/2 method 3.27 cm2    MV valve area by continuity eq 3.39 cm2    MV VTI 20.6 cm    Triscuspid Valve Regurgitation Peak Gradient 17 mmHg    Sinus 2.91 cm    ASI 1.5 cm/m2    STJ 3.0 cm    Ascending aorta 3.1 cm    ASI 1.6 cm/m2    IVC diameter 1.93 cm    Mean e' 0.06 m/s    ZLVIDS 0.55     ZLVIDD -1.77     LA area A4C 14.48 cm2    LA area A2C 18.45 cm2    TV resting pulmonary artery pressure 21 mmHg    RV TB RVSP 5 mmHg    Est. RA pres 3 mmHg    Narrative      Left Ventricle: The left ventricle is normal in size. Mildly increased   wall thickness. Normal wall motion. There is normal systolic function with   a visually estimated ejection fraction of 55 - 60%. Grade I diastolic   dysfunction.    Right Ventricle: The right ventricle is normal in size Systolic   function is normal.    Normal valvular structure and function.       Assessment and Plan:     * Dyspnea on exertion  - presented with LOPEZ along with fatigue and chest pain   - initial troponin 0.008-0.012 with reports of troponin 1.1 this AM likely erroneous given creatinine of 11.2; repeat troponin 0.009; EKG with NSR nonspecific TWA to inferolateral leads   - TTE with normal LVEF  - presentation concerning for ischemic etiology; risk factors for CAD HTN, HLP, DMII and age; feel further ischemic evaluation needed and feel [facility] warranted over stress test     Hypertension  - SBP 90s-130s  - on Norvasc 5 HCTZ 25 Metoprolol 50 BID- continued while admitted   - goal BP less than 130/80  - BP well controlled     Hyperlipidemia  - recent FLP with   - on  Lipitor 80  - goal LDL   - cholesterol well controlled           VTE Risk Mitigation (From admission, onward)           Ordered     enoxaparin injection 40 mg  Daily         06/12/25 2025     IP VTE HIGH RISK PATIENT  Once         06/12/25 2027     Place sequential compression device  Until discontinued         06/12/25 2027     Place sequential compression device  Until discontinued         06/12/25 2025                    Thank you for your consult. I will follow-up with patient. Please contact us if you have any additional questions.    JENNIE Nazario, ANP  Cardiology   [facility] - Med Surg

## 2025-06-13 NOTE — HPI
Nam William is a chronically ill 69-year-old female that presented to the emergency department today for the evaluation of shortness of breath.  The patient presented to the emergency department upon the advice of her primary care physician.  Apparently, she was being evaluated today for persistent shortness of breath.  She was assessed at the clinic and was found to be hypotensive; blood pressure was reportedly noted at 88/60.  The patient was then transported to the emergency department for further evaluation.

## 2025-06-13 NOTE — PLAN OF CARE
3 mL of air removed from radial vasc band.  No hematoma or bleeding noted.  +2 keena radial pulses palpated. Skin normal in color, warm to touch, < 3 sec cap refill.  Will continue to monitor pt.

## 2025-06-13 NOTE — ASSESSMENT & PLAN NOTE
Patient's blood pressure range in the last 24 hours was: BP  Min: 88/60  Max: 191/85.The patient's inpatient anti-hypertensive regimen is listed below:  Current Antihypertensives  amLODIPine tablet 5 mg, Daily, Oral  hydroCHLOROthiazide tablet 25 mg, Daily, Oral  metoprolol tartrate (LOPRESSOR) tablet 50 mg, 2 times daily, Oral    Plan  - BP is controlled, no changes needed to their regimen  - Resume home medications

## 2025-06-13 NOTE — ASSESSMENT & PLAN NOTE
"Patient's FSGs are uncontrolled due to hyperglycemia on current medication regimen.  Last A1c reviewed-   Lab Results   Component Value Date    HGBA1C 11.4 (H) 03/13/2025     Most recent fingerstick glucose reviewed- No results for input(s): "POCTGLUCOSE" in the last 24 hours.  Current correctional scale  Medium  Maintain anti-hyperglycemic dose as follows-   Antihyperglycemics (From admission, onward)      None          Hold Oral hypoglycemics while patient is in the hospital.  "

## 2025-06-13 NOTE — H&P
[facility]  History & Physical    Patient Name: Nam William  MRN: [id number]  Patient Class: OP- Observation  Admission Date: 6/12/2025  Attending Physician: Bhumi Hill*   Primary Care Provider: Alejandro Tripp MD         Patient information was obtained from patient and ER records.     Subjective:     Principal Problem:Dyspnea on exertion    Chief Complaint:   Chief Complaint   Patient presents with    Shortness of Breath     Ongoing shortness of breath. Seen by MD today, was found to be hypotensive at clinic (88/60) normotensive in triage.         HPI: Nam William is a chronically ill 69-year-old female that presented to the emergency department today for the evaluation of shortness of breath.  The patient presented to the emergency department upon the advice of her primary care physician.  Apparently, she was being evaluated today for persistent shortness of breath.  She was assessed at the clinic and was found to be hypotensive; blood pressure was reportedly noted at 88/60.  The patient was then transported to the emergency department for further evaluation.    Past Medical History:   Diagnosis Date    Diabetes mellitus, type 2     Hypertension        History reviewed. No pertinent surgical history.    Review of patient's allergies indicates:  No Known Allergies    No current facility-administered medications on file prior to encounter.     Current Outpatient Medications on File Prior to Encounter   Medication Sig    amLODIPine (NORVASC) 5 MG tablet Take 1 tablet (5 mg total) by mouth once daily.    atorvastatin (LIPITOR) 80 MG tablet Take 1 tablet (80 mg total) by mouth once daily.    BLOOD PRESSURE CUFF Misc 1 kit by Misc.(Non-Drug; Combo Route) route once daily.    blood sugar diagnostic Strp To check BG 4 times daily, to use with insurance preferred meter (Patient not taking: Reported on 6/12/2025)    blood-glucose meter kit To check BG 4 times daily, to use with  insurance preferred meter    diclofenac sodium (VOLTAREN ARTHRITIS PAIN) 1 % Gel Apply 2 g topically once daily. (Patient not taking: Reported on 6/12/2025)    gabapentin (NEURONTIN) 300 MG capsule Take 1 capsule (300 mg total) by mouth 3 (three) times daily. (Patient not taking: Reported on 6/12/2025)    glipiZIDE (GLUCOTROL) 10 MG tablet Take 10 mg by mouth 2 (two) times daily.    hydroCHLOROthiazide (HYDRODIURIL) 25 MG tablet Take 1 tablet (25 mg total) by mouth once daily.    hydrOXYzine pamoate (VISTARIL) 25 MG Cap Take 1 capsule (25 mg total) by mouth every 8 (eight) hours as needed. (Patient not taking: Reported on 6/12/2025)    lancets (LANCETS,THIN) Misc Use to test BG twice daily. (Patient not taking: Reported on 6/12/2025)    LIDOcaine (LIDODERM) 5 % Place 1 patch onto the skin once daily. Remove & Discard patch within 12 hours or as directed by MD (Patient not taking: Reported on 6/12/2025)    lisinopriL (PRINIVIL,ZESTRIL) 40 MG tablet Take 1 tablet (40 mg total) by mouth once daily.    metFORMIN (GLUCOPHAGE) 1000 MG tablet Take 1 tablet (1,000 mg total) by mouth 2 (two) times daily.    metoprolol tartrate (LOPRESSOR) 50 MG tablet Take 1 tablet (50 mg total) by mouth 2 (two) times daily.    miscellaneous medical supply Kit Toilet seat with safety rails    naproxen (NAPROSYN) 500 MG tablet Take 1 tablet (500 mg total) by mouth 2 (two) times daily with meals. (Patient taking differently: Take 500 mg by mouth as needed.)    thiamine (VITAMIN B-1) 50 MG tablet Take 50 mg by mouth once daily.     Family History       Problem Relation (Age of Onset)    Breast cancer Sister, Sister          Tobacco Use    Smoking status: Never    Smokeless tobacco: Never   Substance and Sexual Activity    Alcohol use: No    Drug use: No    Sexual activity: Not Currently     Partners: Male     Review of Systems   Constitutional: Negative.    HENT: Negative.     Eyes: Negative.    Respiratory:  Positive for shortness of breath.     Cardiovascular:  Positive for chest pain.   Gastrointestinal: Negative.    Endocrine: Negative.    Genitourinary: Negative.    Musculoskeletal: Negative.    Skin: Negative.    Allergic/Immunologic: Negative.    Neurological: Negative.    Hematological: Negative.    Psychiatric/Behavioral: Negative.       Objective:     Vital Signs (Most Recent):  Temp: 97.6 °F (36.4 °C) (06/12/25 2024)  Pulse: 87 (06/12/25 2024)  Resp: 18 (06/12/25 2024)  BP: 133/73 (06/12/25 2024)  SpO2: 97 % (06/12/25 2024) Vital Signs (24h Range):  Temp:  [97.5 °F (36.4 °C)-97.7 °F (36.5 °C)] 97.6 °F (36.4 °C)  Pulse:  [80-92] 87  Resp:  [16-20] 18  SpO2:  [97 %-100 %] 97 %  BP: ()/(60-85) 133/73     Weight: 89.8 kg (198 lb)  Body mass index is 35.07 kg/m².     Physical Exam  Constitutional:       Appearance: Normal appearance.   HENT:      Head: Normocephalic and atraumatic.      Right Ear: Tympanic membrane normal.      Nose: Nose normal.   Cardiovascular:      Rate and Rhythm: Normal rate and regular rhythm.      Pulses: Normal pulses.   Pulmonary:      Effort: Pulmonary effort is normal.      Breath sounds: Normal breath sounds.   Abdominal:      General: Abdomen is flat. Bowel sounds are normal.      Palpations: Abdomen is soft.   Musculoskeletal:         General: Normal range of motion.      Cervical back: Normal range of motion and neck supple.   Skin:     General: Skin is warm and dry.      Capillary Refill: Capillary refill takes less than 2 seconds.   Neurological:      General: No focal deficit present.      Mental Status: She is alert.   Psychiatric:         Mood and Affect: Mood normal.                Significant Labs: All pertinent labs within the past 24 hours have been reviewed.  Recent Lab Results         06/12/25  1506        Albumin 4.2       ALP 48       ALT 12       Anion Gap 13       AST 13       Baso # 0.07       Basophil % 0.9       BILIRUBIN TOTAL 0.8  Comment: For infants and newborns, interpretation of results  should be based   on gestational age, weight and in agreement with clinical   observations.    Premature Infant recommended reference ranges:   0-24 hours:  <8.0 mg/dL   24-48 hours: <12.0 mg/dL   3-5 days:    <15.0 mg/dL   6-29 days:   <15.0 mg/dL       BNP 25  Comment: Values of less than 100 pg/ml are consistent with non-CHF populations.        BUN 26       Calcium 10.0       Chloride 103       CO2 25       Creatinine 1.2       D-Dimer 0.41  Comment: The quantitative D-dimer assay should be used as an aid in the diagnosis of deep vein thrombosis and pulmonary embolism in patients with the appropriate presentation and clinical history. The upper limit of the reference interval and the clinical cut off point are identical. Causes of a positive (>0.50 mg/L FEU) D-Dimer test include, but are not limited to: DVT, PE, DIC, thrombolytic therapy, anticoagulant therapy, recent surgery, trauma, or pregnancy, disseminated malignancy, aortic aneurysm, cirrhosis, and severe infection. False negative results may occur in patients with distal DVT.       eGFR 50  Comment: Estimated GFR calculated using the CKD-EPI creatinine (2021) equation.       Eos # 0.08       Eos % 1.1       Glucose 192       Gran # (ANC) 5.10       Hematocrit 40.5       Hemoglobin 13.7       Immature Grans (Abs) 0.01  Comment: Mild elevation in immature granulocytes is non specific and can be seen in a variety of conditions including stress response, acute inflammation, trauma and pregnancy. Correlation with other laboratory and clinical findings is essential.       Immature Granulocytes 0.1       Lymph # 1.74       Lymph % 23.0       MCH 28.5       MCHC 33.8       MCV 84       Mono # 0.57       Mono % 7.5       MPV 11.7       Neut % 67.4       nRBC 0       Platelet Count 217       Potassium 3.5       PROTEIN TOTAL 7.8       RBC 4.81       RDW 12.4       Sodium 141       Troponin I 0.008  Comment: The reference interval for Troponin I represents the 99th  "percentile cutoff for our facility and is consistent with 3rd generation assay performance.       WBC 7.57               Significant Imaging: I have reviewed all pertinent imaging results/findings within the past 24 hours.  I have reviewed and interpreted all pertinent imaging results/findings within the past 24 hours.  Assessment/Plan:     Assessment & Plan  Dyspnea on exertion  -Admit to observation status  -NPO after MN  -Consult cardiology for consideration for angiogram in AM    Diabetes mellitus  Patient's FSGs are uncontrolled due to hyperglycemia on current medication regimen.  Last A1c reviewed-   Lab Results   Component Value Date    HGBA1C 11.4 (H) 03/13/2025     Most recent fingerstick glucose reviewed- No results for input(s): "POCTGLUCOSE" in the last 24 hours.  Current correctional scale  Medium  Maintain anti-hyperglycemic dose as follows-   Antihyperglycemics (From admission, onward)      None          Hold Oral hypoglycemics while patient is in the hospital.  Hyperlipidemia  -Resume home medications    Hypertension  Patient's blood pressure range in the last 24 hours was: BP  Min: 88/60  Max: 191/85.The patient's inpatient anti-hypertensive regimen is listed below:  Current Antihypertensives  amLODIPine tablet 5 mg, Daily, Oral  hydroCHLOROthiazide tablet 25 mg, Daily, Oral  metoprolol tartrate (LOPRESSOR) tablet 50 mg, 2 times daily, Oral    Plan  - BP is controlled, no changes needed to their regimen  - Resume home medications  Severe obesity (BMI 35.0-39.9) with comorbidity  Body mass index is 35.07 kg/m². Morbid obesity complicates all aspects of disease management from diagnostic modalities to treatment. Weight loss encouraged and health benefits explained to patient.       Dyspnea      VTE Risk Mitigation (From admission, onward)           Ordered     enoxaparin injection 40 mg  Daily         06/12/25 2025     IP VTE HIGH RISK PATIENT  Once         06/12/25 2027     Place sequential " compression device  Until discontinued         06/12/25 2027     Place sequential compression device  Until discontinued         06/12/25 2025                         On 06/12/2025, patient should be placed in hospital observation services under my care in collaboration with Dr Sergio MD.           Radha Tillman NP  Department of Hospital Medicine  [facility]

## 2025-06-13 NOTE — ASSESSMENT & PLAN NOTE
-Admit to observation status  -NPO after MN  -Consult cardiology for consideration for angiogram in AM

## 2025-06-13 NOTE — NURSING
Patient leaving unit for [facility].  Informed of room change to 426 on Tele floor.

## 2025-06-13 NOTE — PLAN OF CARE
Patient transferred to floor on tele monitor.  VSS. No c/o pain.   Patient attached to tele monitor upon arrival in room 503.  Right wrist gauze/tegaderm CDI. No bleeding or hematoma noted. Site reviewed with Linnea at bedside.  All questions answered.

## 2025-06-13 NOTE — ASSESSMENT & PLAN NOTE
- presented with LOPEZ along with fatigue and chest pain   - initial troponin 0.008-0.012 with reports of troponin 1.1 this AM likely erroneous given creatinine of 11.2; repeat troponin 0.009; EKG with NSR nonspecific TWA to inferolateral leads   - TTE with normal LVEF  - presentation concerning for ischemic etiology; risk factors for CAD HTN, HLP, DMII and age; feel further ischemic evaluation needed and feel LHC warranted over stress test

## 2025-06-13 NOTE — SUBJECTIVE & OBJECTIVE
Past Medical History:   Diagnosis Date    Diabetes mellitus, type 2     Hypertension        History reviewed. No pertinent surgical history.    Review of patient's allergies indicates:  No Known Allergies    No current facility-administered medications on file prior to encounter.     Current Outpatient Medications on File Prior to Encounter   Medication Sig    amLODIPine (NORVASC) 5 MG tablet Take 1 tablet (5 mg total) by mouth once daily.    atorvastatin (LIPITOR) 80 MG tablet Take 1 tablet (80 mg total) by mouth once daily. (Patient taking differently: Take 40 mg by mouth once daily.)    blood-glucose meter kit To check BG 4 times daily, to use with insurance preferred meter    cyanocobalamin (VITAMIN B-12) 1000 MCG tablet Take 1,000 mcg by mouth once daily.    glipiZIDE (GLUCOTROL) 10 MG tablet Take 10 mg by mouth 2 (two) times daily.    hydrOXYzine pamoate (VISTARIL) 25 MG Cap Take 1 capsule (25 mg total) by mouth every 8 (eight) hours as needed.    lisinopriL (PRINIVIL,ZESTRIL) 40 MG tablet Take 1 tablet (40 mg total) by mouth once daily.    metFORMIN (GLUCOPHAGE) 1000 MG tablet Take 1 tablet (1,000 mg total) by mouth 2 (two) times daily.    metoprolol tartrate (LOPRESSOR) 50 MG tablet Take 1 tablet (50 mg total) by mouth 2 (two) times daily.    BLOOD PRESSURE CUFF Misc 1 kit by Misc.(Non-Drug; Combo Route) route once daily.    blood sugar diagnostic Strp To check BG 4 times daily, to use with insurance preferred meter (Patient not taking: Reported on 6/12/2025)    diclofenac sodium (VOLTAREN ARTHRITIS PAIN) 1 % Gel Apply 2 g topically once daily. (Patient not taking: Reported on 6/12/2025)    gabapentin (NEURONTIN) 300 MG capsule Take 1 capsule (300 mg total) by mouth 3 (three) times daily. (Patient not taking: Reported on 6/12/2025)    hydroCHLOROthiazide (HYDRODIURIL) 25 MG tablet Take 1 tablet (25 mg total) by mouth once daily.    lancets (LANCETS,THIN) Misc Use to test BG twice daily. (Patient not taking:  Reported on 6/12/2025)    LIDOcaine (LIDODERM) 5 % Place 1 patch onto the skin once daily. Remove & Discard patch within 12 hours or as directed by MD (Patient not taking: Reported on 6/12/2025)    miscellaneous medical supply Kit Toilet seat with safety rails    naproxen (NAPROSYN) 500 MG tablet Take 1 tablet (500 mg total) by mouth 2 (two) times daily with meals. (Patient taking differently: Take 500 mg by mouth as needed.)    thiamine (VITAMIN B-1) 50 MG tablet Take 50 mg by mouth once daily.     Family History       Problem Relation (Age of Onset)    Breast cancer Sister, Sister          Tobacco Use    Smoking status: Never    Smokeless tobacco: Never   Substance and Sexual Activity    Alcohol use: No    Drug use: No    Sexual activity: Not Currently     Partners: Male     Review of Systems   Constitutional: Positive for malaise/fatigue. Negative for chills, decreased appetite, diaphoresis and fever.   Cardiovascular:  Positive for chest pain and dyspnea on exertion. Negative for claudication, cyanosis, irregular heartbeat, leg swelling, near-syncope, orthopnea, palpitations, paroxysmal nocturnal dyspnea and syncope.   Respiratory:  Negative for cough, hemoptysis, shortness of breath and wheezing.    Gastrointestinal:  Negative for bloating, abdominal pain, constipation, diarrhea, melena, nausea and vomiting.   Neurological:  Negative for dizziness and weakness.     Objective:     Vital Signs (Most Recent):  Temp: 97.3 °F (36.3 °C) (06/13/25 0811)  Pulse: 72 (06/13/25 0811)  Resp: 20 (06/13/25 0811)  BP: 124/78 (06/13/25 0811)  SpO2: 99 % (06/13/25 0811) Vital Signs (24h Range):  Temp:  [97.3 °F (36.3 °C)-97.7 °F (36.5 °C)] 97.3 °F (36.3 °C)  Pulse:  [68-92] 72  Resp:  [16-20] 20  SpO2:  [95 %-100 %] 99 %  BP: ()/(55-85) 124/78     Weight: 93.9 kg (207 lb)  Body mass index is 36.67 kg/m².    SpO2: 99 %       No intake or output data in the 24 hours ending 06/13/25 1151    Lines/Drains/Airways        Peripheral Intravenous Line  Duration                  Peripheral IV - Single Lumen 06/12/25 1600 22 G Anterior;Right Forearm <1 day                     Physical Exam  Constitutional:       General: She is not in acute distress.     Appearance: She is well-developed.   Cardiovascular:      Rate and Rhythm: Normal rate and regular rhythm.      Heart sounds: No murmur heard.     No gallop.   Pulmonary:      Effort: Pulmonary effort is normal. No respiratory distress.      Breath sounds: Normal breath sounds. No wheezing.   Abdominal:      General: Bowel sounds are normal. There is no distension.      Palpations: Abdomen is soft.      Tenderness: There is no abdominal tenderness.   Skin:     General: Skin is warm and dry.   Neurological:      Mental Status: She is alert and oriented to person, place, and time.              Significant Imaging: Echocardiogram: Transthoracic echo (TTE) complete (Cupid Only):   Results for orders placed or performed during the hospital encounter of 06/12/25   Echo   Result Value Ref Range    BSA 2.04 m2    A2C EF 37 %    A4C EF 58 %    LVOT stroke volume 69.9 cm3    LVIDd 4.7 3.5 - 6.0 cm    LV Systolic Volume 56 mL    LV Systolic Volume Index 28.6 mL/m2    LVIDs 3.7 2.1 - 4.0 cm    LV ESV A2C 54.35 mL    LV Diastolic Volume 100 mL    LV ESV A4C 33.37 mL    LV Diastolic Volume Index 51.02 mL/m2    LV EDV A2C 43.086286632410029 mL    LV EDV A4C 56.08 mL    Left Ventricular End Systolic Volume by Teichholz Method 56.44 mL    Left Ventricular End Diastolic Volume by Teichholz Method 99.78 mL    IVS 1.0 0.6 - 1.1 cm    LVOT diameter 2.1 cm    LVOT area 3.5 cm2    FS 21.3 (A) 28 - 44 %    Left Ventricle Relative Wall Thickness 0.38 cm    PW 0.9 0.6 - 1.1 cm    LV mass 153.4 g    LV Mass Index 78.3 g/m2    MV Peak E Epifanio 0.55 m/s    TDI LATERAL 0.07 m/s    TDI SEPTAL 0.05 m/s    E/E' ratio 9 m/s    MV Peak A Epifanoi 0.68 m/s    TR Max Epifanio 2.1 m/s    E/A ratio 0.81     E wave deceleration time 232  msec    LV SEPTAL E/E' RATIO 11.0 m/s    LV LATERAL E/E' RATIO 7.9 m/s    PV Peak S Epifanio 0.50 m/s    PV Peak D Epifanio 0.32 m/s    Pulm vein S/D ratio 1.56     LVOT peak epifanio 1.1 m/s    Left Ventricular Outflow Tract Mean Velocity 0.80 cm/s    Left Ventricular Outflow Tract Mean Gradient 2.76 mmHg    RV S' 12.00 cm/s    TAPSE 1.8 cm    LA Vol (MOD) 44 mL    ADELINE (MOD) 22 mL/m2    RA area length vol 24.22 mL    RA Area 11.7 cm2    RA vol index 12.36 mL/m2    RA Vol 25.29 mL    AV mean gradient 5 mmHg    AV peak gradient 8 mmHg    Ao peak epifanio 1.4 m/s    Ao VTI 25.5 cm    LVOT peak VTI 20.2 cm    AV valve area 2.7 cm²    AV Velocity Ratio 0.79     AV index (prosthetic) 0.79     RAMYA by Velocity Ratio 2.7 cm²    Mr max epifanio 2.98 m/s    MV peak gradient 2 mmHg    MV stenosis pressure 1/2 time 67.23 ms    MV valve area p 1/2 method 3.27 cm2    MV valve area by continuity eq 3.39 cm2    MV VTI 20.6 cm    Triscuspid Valve Regurgitation Peak Gradient 17 mmHg    Sinus 2.91 cm    ASI 1.5 cm/m2    STJ 3.0 cm    Ascending aorta 3.1 cm    ASI 1.6 cm/m2    IVC diameter 1.93 cm    Mean e' 0.06 m/s    ZLVIDS 0.55     ZLVIDD -1.77     LA area A4C 14.48 cm2    LA area A2C 18.45 cm2    TV resting pulmonary artery pressure 21 mmHg    RV TB RVSP 5 mmHg    Est. RA pres 3 mmHg    Narrative      Left Ventricle: The left ventricle is normal in size. Mildly increased   wall thickness. Normal wall motion. There is normal systolic function with   a visually estimated ejection fraction of 55 - 60%. Grade I diastolic   dysfunction.    Right Ventricle: The right ventricle is normal in size Systolic   function is normal.    Normal valvular structure and function.

## 2025-06-13 NOTE — NURSING
Report received from Reed in cath lab.  [facility] today.  Patient to return to unit.

## 2025-06-13 NOTE — PLAN OF CARE
Patient transferred to recovery cath lab slot 5 via stretcher with side rails up x2 . Pt AAO X 4 and able to follow commands. Pt is stable when connecting to cardiac monitors.  VSS. Right radial vasc band in place with 12ml of air in band c.d.i. no bleeding or hematoma noted. +2 keena radial pulses palpated. +2 keena pedal pulses.Skin normal in color and warm to touch, <3 sec cap refill.  Fall risk precautions given and patient acknowledges.  AIDET completed to pt.  Will continue to monitor patient.

## 2025-06-13 NOTE — PLAN OF CARE
Problem: Adult Inpatient Plan of Care  Goal: Absence of Hospital-Acquired Illness or Injury  Outcome: Progressing  Goal: Optimal Comfort and Wellbeing  Outcome: Progressing     Problem: Diabetes Comorbidity  Goal: Blood Glucose Level Within Targeted Range  Outcome: Progressing     Problem: Breathing Pattern Ineffective  Goal: Effective Breathing Pattern  Outcome: Progressing     Problem: Fall Injury Risk  Goal: Absence of Fall and Fall-Related Injury  Outcome: Progressing     Pt AAOX4. Denies pain or discomfort. Medications administered via MAR. Respirations even and unlabored on room air, no SOB observed. Blood glucose monitored. Cardiac monitoring in place. Safety maintained and call light in reach. Pt instructed to use call light for assistance.

## 2025-06-13 NOTE — NURSING
Patient returned to unit.  [facility] diagnostic outcome.  Right radial approach dressing CDI.  No abnormalities noted.  Vitals obtained.  Dinner warmed and now eating.  Safety maintained.  Bed alarm on.  Call light within reach.

## 2025-06-14 VITALS
OXYGEN SATURATION: 98 % | BODY MASS INDEX: 36.68 KG/M2 | RESPIRATION RATE: 17 BRPM | WEIGHT: 207 LBS | TEMPERATURE: 97 F | HEART RATE: 82 BPM | DIASTOLIC BLOOD PRESSURE: 69 MMHG | HEIGHT: 63 IN | SYSTOLIC BLOOD PRESSURE: 141 MMHG

## 2025-06-14 PROBLEM — R06.09 DYSPNEA ON EXERTION: Status: RESOLVED | Noted: 2025-06-12 | Resolved: 2025-06-14

## 2025-06-14 PROBLEM — R06.00 DYSPNEA: Status: RESOLVED | Noted: 2025-06-12 | Resolved: 2025-06-14

## 2025-06-14 LAB
ANION GAP (OHS): 11 MMOL/L (ref 8–16)
BUN SERPL-MCNC: 29 MG/DL (ref 8–23)
CALCIUM SERPL-MCNC: 9.2 MG/DL (ref 8.7–10.5)
CHLORIDE SERPL-SCNC: 104 MMOL/L (ref 95–110)
CO2 SERPL-SCNC: 24 MMOL/L (ref 23–29)
CREAT SERPL-MCNC: 0.9 MG/DL (ref 0.5–1.4)
ERYTHROCYTE [DISTWIDTH] IN BLOOD BY AUTOMATED COUNT: 12.5 % (ref 11.5–14.5)
GFR SERPLBLD CREATININE-BSD FMLA CKD-EPI: >60 ML/MIN/1.73/M2
GLUCOSE SERPL-MCNC: 177 MG/DL (ref 70–110)
HCT VFR BLD AUTO: 38.3 % (ref 37–48.5)
HGB BLD-MCNC: 12.9 GM/DL (ref 12–16)
MCH RBC QN AUTO: 28.6 PG (ref 27–31)
MCHC RBC AUTO-ENTMCNC: 33.7 G/DL (ref 32–36)
MCV RBC AUTO: 85 FL (ref 82–98)
PLATELET # BLD AUTO: 168 K/UL (ref 150–450)
PMV BLD AUTO: 11.6 FL (ref 9.2–12.9)
POCT GLUCOSE: 202 MG/DL (ref 70–110)
POTASSIUM SERPL-SCNC: 3.2 MMOL/L (ref 3.5–5.1)
RBC # BLD AUTO: 4.51 M/UL (ref 4–5.4)
SODIUM SERPL-SCNC: 139 MMOL/L (ref 136–145)
WBC # BLD AUTO: 6.62 K/UL (ref 3.9–12.7)

## 2025-06-14 PROCEDURE — 36415 COLL VENOUS BLD VENIPUNCTURE: CPT | Performed by: NURSE PRACTITIONER

## 2025-06-14 PROCEDURE — G0378 HOSPITAL OBSERVATION PER HR: HCPCS

## 2025-06-14 PROCEDURE — 99900035 HC TECH TIME PER 15 MIN (STAT)

## 2025-06-14 PROCEDURE — 94761 N-INVAS EAR/PLS OXIMETRY MLT: CPT

## 2025-06-14 PROCEDURE — 25000003 PHARM REV CODE 250: Performed by: NURSE PRACTITIONER

## 2025-06-14 PROCEDURE — 85027 COMPLETE CBC AUTOMATED: CPT | Performed by: NURSE PRACTITIONER

## 2025-06-14 PROCEDURE — 82374 ASSAY BLOOD CARBON DIOXIDE: CPT | Performed by: NURSE PRACTITIONER

## 2025-06-14 RX ORDER — HYDROXYZINE HYDROCHLORIDE 25 MG/1
25 TABLET, FILM COATED ORAL 3 TIMES DAILY PRN
Status: DISCONTINUED | OUTPATIENT
Start: 2025-06-14 | End: 2025-06-14 | Stop reason: HOSPADM

## 2025-06-14 RX ORDER — POTASSIUM CHLORIDE 20 MEQ/1
40 TABLET, EXTENDED RELEASE ORAL ONCE
Status: COMPLETED | OUTPATIENT
Start: 2025-06-14 | End: 2025-06-14

## 2025-06-14 RX ORDER — ATORVASTATIN CALCIUM 80 MG/1
40 TABLET, FILM COATED ORAL DAILY
Qty: 45 TABLET | Refills: 3 | Status: SHIPPED | OUTPATIENT
Start: 2025-06-14 | End: 2026-06-14

## 2025-06-14 RX ADMIN — INSULIN ASPART 4 UNITS: 100 INJECTION, SOLUTION INTRAVENOUS; SUBCUTANEOUS at 06:06

## 2025-06-14 RX ADMIN — METOPROLOL TARTRATE 50 MG: 50 TABLET, FILM COATED ORAL at 08:06

## 2025-06-14 RX ADMIN — POTASSIUM CHLORIDE 40 MEQ: 1500 TABLET, EXTENDED RELEASE ORAL at 11:06

## 2025-06-14 RX ADMIN — ASPIRIN 81 MG: 81 TABLET, COATED ORAL at 08:06

## 2025-06-14 RX ADMIN — HYDROXYZINE HYDROCHLORIDE 25 MG: 25 TABLET, FILM COATED ORAL at 09:06

## 2025-06-14 RX ADMIN — ATORVASTATIN CALCIUM 80 MG: 40 TABLET, FILM COATED ORAL at 08:06

## 2025-06-14 RX ADMIN — Medication 6 MG: at 01:06

## 2025-06-14 RX ADMIN — AMLODIPINE BESYLATE 5 MG: 5 TABLET ORAL at 08:06

## 2025-06-14 RX ADMIN — HYDROCHLOROTHIAZIDE 25 MG: 25 TABLET ORAL at 08:06

## 2025-06-14 NOTE — PLAN OF CARE
SW communicated with pts daughter to discuss dc planning. Pt will dc with no needs noted. Pt is about to travel to [place] with her daughter. HH declined. Pt is not on HD or coumadin. No additional cm needs. Pts daughter is at bedside and will provide transport home.       Stephanie William (Daughter)  [phone]        Cleared from CM . Bedside Nurse and VN notified.     06/14/25 1100   Final Note   Assessment Type Final Discharge Note   Anticipated Discharge Disposition Home   Hospital Resources/Appts/Education Provided Appointments scheduled and added to AVS   Post-Acute Status   Discharge Delays None known at this time

## 2025-06-14 NOTE — PLAN OF CARE
Problem: Adult Inpatient Plan of Care  Goal: Absence of Hospital-Acquired Illness or Injury  Outcome: Progressing     Problem: Diabetes Comorbidity  Goal: Blood Glucose Level Within Targeted Range  Outcome: Progressing     Problem: Breathing Pattern Ineffective  Goal: Effective Breathing Pattern  Outcome: Progressing     Problem: Fall Injury Risk  Goal: Absence of Fall and Fall-Related Injury  Outcome: Progressing     Problem: Comorbidity Management  Goal: Blood Pressure in Desired Range  Outcome: Progressing     Problem: Wound  Goal: Improved Oral Intake  Outcome: Progressing   Pt AAOx4. Denies pain or discomfort. Blood glucose monitored. Gauze dressing to right wrist C/D/I. Standby assistance to bedside commode. Safety maintained and call light in reach. Pt instructed to use call light for assistance.

## 2025-06-14 NOTE — PLAN OF CARE
Problem: Adult Inpatient Plan of Care  Goal: Plan of Care Review  Outcome: Progressing     Problem: Diabetes Comorbidity  Goal: Blood Glucose Level Within Targeted Range  Outcome: Progressing     Problem: Fall Injury Risk  Goal: Absence of Fall and Fall-Related Injury  Outcome: Progressing     Problem: Wound  Goal: Optimal Functional Ability  Outcome: Progressing    Blood glucose monitored.   Safety maintained.  Up to bedside commode.  Bed alarm on.  Encouraged to call for assistance.

## 2025-06-14 NOTE — PLAN OF CARE
Introduced as VN and will be reviewing discharge instructions.  Reviewed AVS with patient and daughter including education on signs and symptoms of bleeding and infection at cardiac cath site, hypertension and the digital program and follow up care.  Educated patient and daughter  on reason for admission, home medication list, and discharge instructions including when to return to ED and the following doctor appointments.  Education per flowsheet.  Opportunity given for questions and questions answered.   Nurse notified of   completion of discharge education. Patient wants to finish lunch, has to get dressed and will call the desk when she is ready to leave.

## 2025-06-14 NOTE — NURSING
Home Oxygen Evaluation    Date Performed: 2025    1) Patient's Home O2 Sat on room air, while at rest: 97%        If O2 sats on room air at rest are 88% or below, patient qualifies. No additional testing needed. Document N/A in steps 2 and 3. If 89% or above, complete steps 2.      2) Patient's O2 Sat on room air while exercisin%        If O2 sats on room air while exercising remain 89% or above patient does not qualify, no further testing needed Document N/A in step 3. If O2 sats on room air while exercising are 88% or below, continue to step 3.      3) Patient's O2 Sat while exercising on O2: 97% at 0 LPM         (Must show improvement from #2 for patients to qualify)    If O2 sats improve on oxygen, patient qualifies for portable oxygen. If not, the patient does not qualify.

## 2025-06-14 NOTE — PLAN OF CARE
Problem: Adult Inpatient Plan of Care  Goal: Plan of Care Review  Outcome: Adequate for Care Transition  Goal: Patient-Specific Goal (Individualized)  Outcome: Adequate for Care Transition  Goal: Absence of Hospital-Acquired Illness or Injury  Outcome: Adequate for Care Transition  Goal: Optimal Comfort and Wellbeing  Outcome: Adequate for Care Transition  Goal: Readiness for Transition of Care  Outcome: Adequate for Care Transition     Problem: Diabetes Comorbidity  Goal: Blood Glucose Level Within Targeted Range  Outcome: Adequate for Care Transition     Problem: Breathing Pattern Ineffective  Goal: Effective Breathing Pattern  Outcome: Adequate for Care Transition     Problem: Fall Injury Risk  Goal: Absence of Fall and Fall-Related Injury  Outcome: Adequate for Care Transition     Problem: Comorbidity Management  Goal: Blood Pressure in Desired Range  Outcome: Adequate for Care Transition     Problem: Wound  Goal: Optimal Coping  Outcome: Adequate for Care Transition  Goal: Optimal Functional Ability  Outcome: Adequate for Care Transition  Goal: Absence of Infection Signs and Symptoms  Outcome: Adequate for Care Transition  Goal: Improved Oral Intake  Outcome: Adequate for Care Transition  Goal: Optimal Pain Control and Function  Outcome: Adequate for Care Transition  Goal: Skin Health and Integrity  Outcome: Adequate for Care Transition  Goal: Optimal Wound Healing  Outcome: Adequate for Care Transition     Problem: Skin Injury Risk Increased  Goal: Skin Health and Integrity  Outcome: Adequate for Care Transition

## 2025-06-14 NOTE — NURSING
PIV and telemetry monitor removed for discharge. AVS given to patient. Awaiting review with VN.   [de-identified] : 2/25/21 - right mastectomy with TE Recon w [employer]

## 2025-06-15 LAB
OHS QRS DURATION: 80 MS
OHS QRS DURATION: 86 MS
OHS QTC CALCULATION: 418 MS
OHS QTC CALCULATION: 441 MS

## 2025-06-16 LAB — POCT GLUCOSE: 151 MG/DL (ref 70–110)

## 2025-06-28 NOTE — SUBJECTIVE & OBJECTIVE
Interval history:  Patient seen at bedside.  No distress noted.  Plans for left heart catheterization today.  Appreciate Cardiology.  We will follow    Review of Systems   Constitutional:  Negative for fatigue.   HENT:  Negative for trouble swallowing.    Respiratory:  Negative for cough and shortness of breath.    Cardiovascular:  Negative for chest pain.   Gastrointestinal:  Negative for diarrhea, nausea and vomiting.   Genitourinary:  Negative for difficulty urinating and hematuria.   Musculoskeletal:  Negative for myalgias.   Neurological:  Negative for weakness.   Psychiatric/Behavioral:  Negative for confusion.      Objective:     Vital Signs (Most Recent):  Temp: 97.4 °F (36.3 °C) (06/14/25 0720)  Pulse: 82 (06/14/25 0752)  Resp: 17 (06/14/25 0752)  BP: (!) 141/69 (06/14/25 0720)  SpO2: 98 % (06/14/25 0752) Vital Signs (24h Range):        Weight: 93.9 kg (207 lb)  Body mass index is 36.67 kg/m².     Physical Exam  Vitals and nursing note reviewed.   Constitutional:       Appearance: Normal appearance.   HENT:      Head: Normocephalic and atraumatic.      Right Ear: Tympanic membrane normal.      Nose: Nose normal.   Eyes:      Extraocular Movements: Extraocular movements intact.   Cardiovascular:      Rate and Rhythm: Normal rate and regular rhythm.      Pulses: Normal pulses.   Pulmonary:      Effort: Pulmonary effort is normal.      Breath sounds: Normal breath sounds.   Abdominal:      General: Abdomen is flat. Bowel sounds are normal.      Palpations: Abdomen is soft.   Musculoskeletal:         General: Normal range of motion.      Cervical back: Normal range of motion and neck supple.   Skin:     General: Skin is warm and dry.      Capillary Refill: Capillary refill takes less than 2 seconds.   Neurological:      General: No focal deficit present.      Mental Status: She is alert.   Psychiatric:         Mood and Affect: Mood normal.                Significant Labs: All pertinent labs within the past 24  hours have been reviewed.  Recent Lab Results       None            Significant Imaging: I have reviewed all pertinent imaging results/findings within the past 24 hours.  I have reviewed and interpreted all pertinent imaging results/findings within the past 24 hours.

## 2025-06-28 NOTE — ASSESSMENT & PLAN NOTE
Patient's blood pressure range in the last 24 hours was: No data recorded.The patient's inpatient anti-hypertensive regimen is listed below:  Current Antihypertensives       Plan  - BP is controlled, no changes needed to their regimen  - Resume home medications

## 2025-06-28 NOTE — ASSESSMENT & PLAN NOTE
"Patient's FSGs are uncontrolled due to hyperglycemia on current medication regimen.  Last A1c reviewed-   Lab Results   Component Value Date    HGBA1C 7.2 (H) 06/12/2025     Most recent fingerstick glucose reviewed- No results for input(s): "POCTGLUCOSE" in the last 24 hours.  Current correctional scale  Medium  Maintain anti-hyperglycemic dose as follows-   Antihyperglycemics (From admission, onward)      None          Hold Oral hypoglycemics while patient is in the hospital.  "

## 2025-06-29 NOTE — DISCHARGE SUMMARY
"[facility] Medicine  Discharge Summary      Patient Name: Nam William  MRN: [id number]  AURORA: [id number]  Patient Class: OP- Observation  Admission Date: 6/12/2025  Hospital Length of Stay: 0 days  Discharge Date and Time: 6/14/2025 12:30 PM  Attending Physician: No att. providers found   Discharging Provider: Rishi Belle NP  Primary Care Provider: Alejandro Tripp MD    Primary Care Team: Networked reference to record PCT     HPI:   Nam William is a chronically ill 69-year-old female that presented to the emergency department today for the evaluation of shortness of breath.  The patient presented to the emergency department upon the advice of her primary care physician.  Apparently, she was being evaluated today for persistent shortness of breath.  She was assessed at the clinic and was found to be hypotensive; blood pressure was reportedly noted at 88/60.  The patient was then transported to the emergency department for further evaluation.    Procedure(s) (LRB):  Left heart cath (Left)  ANGIOGRAM, CORONARY ARTERY (N/A)      Hospital Course:   No notes on file     Goals of Care Treatment Preferences:  Code Status: Full Code         Consults:   Consults (From admission, onward)          Status Ordering Provider     Inpatient consult to Cardiology  Once        Provider:  (Not yet assigned)    Completed KATARINA CRENSHAW            Assessment & Plan  Diabetes mellitus  Patient's FSGs are uncontrolled due to hyperglycemia on current medication regimen.  Last A1c reviewed-   Lab Results   Component Value Date    HGBA1C 7.2 (H) 06/12/2025     Most recent fingerstick glucose reviewed- No results for input(s): "POCTGLUCOSE" in the last 24 hours.  Current correctional scale  Medium  Maintain anti-hyperglycemic dose as follows-   Antihyperglycemics (From admission, onward)      None          Hold Oral hypoglycemics while patient is in the hospital.  Hyperlipidemia  -Resume home " "medications    Hypertension  Patient's blood pressure range in the last 24 hours was: No data recorded.The patient's inpatient anti-hypertensive regimen is listed below:  Current Antihypertensives       Plan  - BP is controlled, no changes needed to their regimen  - Resume home medications  Severe obesity (BMI 35.0-39.9) with comorbidity  Body mass index is 36.67 kg/m². Morbid obesity complicates all aspects of disease management from diagnostic modalities to treatment. Weight loss encouraged and health benefits explained to patient.       Dyspnea on exertion (Resolved: 6/14/2025)  -Admit to observation status  -NPO after MN  -Consult cardiology for consideration for angiogram in AM  Cath Results:  Access:  Right radial with 4-5 [place] sheath  LM:  SALBADOR III flow 0% stenosis  LAD: SALBADOR III flow mild luminal irregularities  LCx:  SALBADOR III flow mild luminal irregularities  RCA: SALBADOR III flow ostial 20% stenosis  Dominance right     LVgram: LVEDP 7 mm Hg     Intervention:  None  Closure device:  Vasc band  Patient tolerated procedure well, no complications     Post Cath Exam:  /78   Pulse 73   Temp 97.5 °F (36.4 °C) (Oral)   Resp 20   Ht 5' 3" (1.6 m)   Wt 93.9 kg (207 lb)   LMP  (LMP Unknown)   SpO2 96%   BMI 36.67 kg/m²      Anesthesia: RN IV Sedation        Estimated Blood Loss (EBL): * No values recorded between 6/13/2025 12:00 AM and 6/13/2025  3:52 PM *           Specimens:   Specimen (24h ago, onward)        None                   Assessment:   Minimal nonobstructive coronary artery disease     Plan:   Medical therapy and risk factors reduction  Okay to discharge home from cardiac standpoint and follow up as an outpatient      MT to home on today.  She will need to follow up with Cardiology outpatient.  Final Active Diagnoses:    Diagnosis Date Noted POA    Hypertension [I10] 02/11/2014 Yes    Diabetes mellitus [E11.9] 02/11/2014 Yes    Severe obesity (BMI 35.0-39.9) with comorbidity [E66.01] " 02/11/2014 Yes    Hyperlipidemia [E78.5] 02/11/2014 Yes      Problems Resolved During this Admission:    Diagnosis Date Noted Date Resolved POA    PRINCIPAL PROBLEM:  Dyspnea on exertion [R06.09] 06/12/2025 06/14/2025 Yes    Dyspnea [R06.00] 06/12/2025 06/14/2025 Yes       Discharged Condition: stable    Disposition: Home or Self Care    Follow Up:   Follow-up Information       Julito Tirado MD. Schedule an appointment as soon as possible for a visit.    Specialties: Interventional Cardiology, Cardiology  Why: As needed, If symptoms worsen  Contact information:  [address]  [address]  [facility] [address]  [phone]               Alejandro Tripp MD. Call.    Specialty: Family Medicine  Why: Please call and schedule PCP follow up within 1-7 days.  Contact information:  [address]  [address]  [place] [id number] [phone]                           Patient Instructions:   No discharge procedures on file.    Significant Diagnostic Studies: Labs: All labs within the past 24 hours have been reviewed    Pending Diagnostic Studies:       None           Medications:  Reconciled Home Medications:      Medication List        CONTINUE taking these medications      amLODIPine 5 MG tablet  Commonly known as: NORVASC  Take 1 tablet (5 mg total) by mouth once daily.     atorvastatin 80 MG tablet  Commonly known as: LIPITOR  Take 0.5 tablets (40 mg total) by mouth once daily.     * BLOOD PRESSURE CUFF Misc  Generic drug: miscellaneous medical supply  1 kit by Misc.(Non-Drug; Combo Route) route once daily.     * miscellaneous medical supply Kit  Toilet seat with safety rails     blood-glucose meter kit  To check BG 4 times daily, to use with insurance preferred meter     glipiZIDE 10 MG tablet  Commonly known as: GLUCOTROL  Take 10 mg by mouth 2 (two) times daily.     hydroCHLOROthiazide 25 MG tablet  Commonly known as: HYDRODIURIL  Take 1 tablet (25 mg total) by mouth once daily.     hydrOXYzine pamoate 25 MG  Cap  Commonly known as: VistariL  Take 1 capsule (25 mg total) by mouth every 8 (eight) hours as needed.     lisinopriL 40 MG tablet  Commonly known as: PRINIVIL,ZESTRIL  Take 1 tablet (40 mg total) by mouth once daily.     metFORMIN 1000 MG tablet  Commonly known as: GLUCOPHAGE  Take 1 tablet (1,000 mg total) by mouth 2 (two) times daily.     metoprolol tartrate 50 MG tablet  Commonly known as: LOPRESSOR  Take 1 tablet (50 mg total) by mouth 2 (two) times daily.     naproxen 500 MG tablet  Commonly known as: NAPROSYN  Take 1 tablet (500 mg total) by mouth 2 (two) times daily with meals.     VITAMIN B-1 50 MG tablet  Generic drug: thiamine  Take 50 mg by mouth once daily.     VITAMIN B-12 1000 MCG tablet  Generic drug: cyanocobalamin  Take 1,000 mcg by mouth once daily.           * This list has 2 medication(s) that are the same as other medications prescribed for you. Read the directions carefully, and ask your doctor or other care provider to review them with you.                ASK your doctor about these medications      blood sugar diagnostic Strp  To check BG 4 times daily, to use with insurance preferred meter     diclofenac sodium 1 % Gel  Commonly known as: VOLTAREN ARTHRITIS PAIN  Apply 2 g topically once daily.     gabapentin 300 MG capsule  Commonly known as: NEURONTIN  Take 1 capsule (300 mg total) by mouth 3 (three) times daily.     lancets Misc  Commonly known as: LANCETS,THIN  Use to test BG twice daily.     LIDOcaine 5 %  Commonly known as: LIDODERM  Place 1 patch onto the skin once daily. Remove & Discard patch within 12 hours or as directed by MD              Indwelling Lines/Drains at time of discharge:   Lines/Drains/Airways       None                       Time spent on the discharge of patient: 45 minutes         Rishi Belle NP  Department of Hospital Medicine  [facility] S[facility]

## 2025-06-29 NOTE — ASSESSMENT & PLAN NOTE
"-Admit to observation status  -NPO after MN  -Consult cardiology for consideration for angiogram in AM  Cath Results:  Access:  Right radial with 4-5 [place] sheath  LM:  SALBADOR III flow 0% stenosis  LAD: SALBADOR III flow mild luminal irregularities  LCx:  SALBADOR III flow mild luminal irregularities  RCA: SALBADOR III flow ostial 20% stenosis  Dominance right     LVgram: LVEDP 7 mm Hg     Intervention:  None  Closure device:  Vasc band  Patient tolerated procedure well, no complications     Post Cath Exam:  /78   Pulse 73   Temp 97.5 °F (36.4 °C) (Oral)   Resp 20   Ht 5' 3" (1.6 m)   Wt 93.9 kg (207 lb)   LMP  (LMP Unknown)   SpO2 96%   BMI 36.67 kg/m²      Anesthesia: RN IV Sedation        Estimated Blood Loss (EBL): * No values recorded between 6/13/2025 12:00 AM and 6/13/2025  3:52 PM *           Specimens:   Specimen (24h ago, onward)        None                   Assessment:   Minimal nonobstructive coronary artery disease     Plan:   Medical therapy and risk factors reduction  Okay to discharge home from cardiac standpoint and follow up as an outpatient      DC to home on today.  She will need to follow up with Cardiology outpatient.  "

## (undated) DEVICE — HEMOSTAT VASC BAND REG 24CM

## (undated) DEVICE — CONTRAST VISIPAQUE 150ML

## (undated) DEVICE — CATH ANG PIGTAIL 4FR INFINITY

## (undated) DEVICE — PAD DEFIB CADENCE ADULT R2

## (undated) DEVICE — KIT LEFT HEART MANIFOLD CUSTOM

## (undated) DEVICE — GLIDESHEATH SLENDER SS 5FR10CM

## (undated) DEVICE — SPIKE SHORT LG BORE 1-WAY 2IN

## (undated) DEVICE — COVER PROBE US 5.5X58L NON LTX

## (undated) DEVICE — DRAPE ANGIO BRACH 38X44IN

## (undated) DEVICE — CATH OPTITORQUE TIGER 5F 100CM

## (undated) DEVICE — GUIDEWIRE WHOLEY STD STR 260CM

## (undated) DEVICE — TUBING HPCIL ROT M/F ADPT 48IN

## (undated) DEVICE — Device